# Patient Record
Sex: FEMALE | Race: BLACK OR AFRICAN AMERICAN | NOT HISPANIC OR LATINO
[De-identification: names, ages, dates, MRNs, and addresses within clinical notes are randomized per-mention and may not be internally consistent; named-entity substitution may affect disease eponyms.]

---

## 2017-08-21 ENCOUNTER — RESULT REVIEW (OUTPATIENT)
Age: 52
End: 2017-08-21

## 2018-01-23 ENCOUNTER — RESULT REVIEW (OUTPATIENT)
Age: 53
End: 2018-01-23

## 2018-02-20 ENCOUNTER — OUTPATIENT (OUTPATIENT)
Dept: OUTPATIENT SERVICES | Facility: HOSPITAL | Age: 53
LOS: 1 days | End: 2018-02-20
Payer: COMMERCIAL

## 2018-02-20 ENCOUNTER — APPOINTMENT (OUTPATIENT)
Dept: MRI IMAGING | Facility: IMAGING CENTER | Age: 53
End: 2018-02-20
Payer: COMMERCIAL

## 2018-02-20 DIAGNOSIS — Z00.8 ENCOUNTER FOR OTHER GENERAL EXAMINATION: ICD-10-CM

## 2018-02-20 PROCEDURE — 82565 ASSAY OF CREATININE: CPT

## 2018-02-20 PROCEDURE — 77059 MRI BREAST BILATERAL: CPT | Mod: 26

## 2018-02-20 PROCEDURE — C8937: CPT

## 2018-02-20 PROCEDURE — A9585: CPT

## 2018-02-20 PROCEDURE — 0159T: CPT | Mod: 26

## 2018-02-20 PROCEDURE — C8908: CPT

## 2018-03-09 ENCOUNTER — OUTPATIENT (OUTPATIENT)
Dept: OUTPATIENT SERVICES | Facility: HOSPITAL | Age: 53
LOS: 1 days | End: 2018-03-09
Payer: COMMERCIAL

## 2018-03-09 ENCOUNTER — RESULT REVIEW (OUTPATIENT)
Age: 53
End: 2018-03-09

## 2018-03-09 ENCOUNTER — APPOINTMENT (OUTPATIENT)
Dept: MRI IMAGING | Facility: IMAGING CENTER | Age: 53
End: 2018-03-09
Payer: COMMERCIAL

## 2018-03-09 ENCOUNTER — APPOINTMENT (OUTPATIENT)
Dept: ULTRASOUND IMAGING | Facility: IMAGING CENTER | Age: 53
End: 2018-03-09
Payer: COMMERCIAL

## 2018-03-09 DIAGNOSIS — Z00.8 ENCOUNTER FOR OTHER GENERAL EXAMINATION: ICD-10-CM

## 2018-03-09 PROCEDURE — 88364 INSITU HYBRIDIZATION (FISH): CPT | Mod: 26

## 2018-03-09 PROCEDURE — A9585: CPT

## 2018-03-09 PROCEDURE — 77065 DX MAMMO INCL CAD UNI: CPT | Mod: 26,LT

## 2018-03-09 PROCEDURE — 88305 TISSUE EXAM BY PATHOLOGIST: CPT

## 2018-03-09 PROCEDURE — 19083 BX BREAST 1ST LESION US IMAG: CPT | Mod: LT

## 2018-03-09 PROCEDURE — 38505 NEEDLE BIOPSY LYMPH NODES: CPT

## 2018-03-09 PROCEDURE — 19085 BX BREAST 1ST LESION MR IMAG: CPT | Mod: LT

## 2018-03-09 PROCEDURE — 88365 INSITU HYBRIDIZATION (FISH): CPT

## 2018-03-09 PROCEDURE — A4648: CPT

## 2018-03-09 PROCEDURE — 88342 IMHCHEM/IMCYTCHM 1ST ANTB: CPT

## 2018-03-09 PROCEDURE — 88360 TUMOR IMMUNOHISTOCHEM/MANUAL: CPT | Mod: 26

## 2018-03-09 PROCEDURE — 76942 ECHO GUIDE FOR BIOPSY: CPT | Mod: 26,LT

## 2018-03-09 PROCEDURE — 88364 INSITU HYBRIDIZATION (FISH): CPT

## 2018-03-09 PROCEDURE — 88341 IMHCHEM/IMCYTCHM EA ADD ANTB: CPT | Mod: 26,59

## 2018-03-09 PROCEDURE — 76942 ECHO GUIDE FOR BIOPSY: CPT

## 2018-03-09 PROCEDURE — 19083 BX BREAST 1ST LESION US IMAG: CPT

## 2018-03-09 PROCEDURE — 88342 IMHCHEM/IMCYTCHM 1ST ANTB: CPT | Mod: 26,59

## 2018-03-09 PROCEDURE — 38505 NEEDLE BIOPSY LYMPH NODES: CPT | Mod: LT

## 2018-03-09 PROCEDURE — 88341 IMHCHEM/IMCYTCHM EA ADD ANTB: CPT

## 2018-03-09 PROCEDURE — 88305 TISSUE EXAM BY PATHOLOGIST: CPT | Mod: 26

## 2018-03-09 PROCEDURE — 88365 INSITU HYBRIDIZATION (FISH): CPT | Mod: 26,59

## 2018-03-09 PROCEDURE — 77065 DX MAMMO INCL CAD UNI: CPT

## 2018-03-09 PROCEDURE — 19085 BX BREAST 1ST LESION MR IMAG: CPT

## 2018-03-09 PROCEDURE — 88360 TUMOR IMMUNOHISTOCHEM/MANUAL: CPT

## 2018-03-15 LAB — SURGICAL PATHOLOGY STUDY: SIGNIFICANT CHANGE UP

## 2018-04-02 ENCOUNTER — APPOINTMENT (OUTPATIENT)
Dept: MAMMOGRAPHY | Facility: IMAGING CENTER | Age: 53
End: 2018-04-02
Payer: COMMERCIAL

## 2018-04-02 ENCOUNTER — OUTPATIENT (OUTPATIENT)
Dept: OUTPATIENT SERVICES | Facility: HOSPITAL | Age: 53
LOS: 1 days | End: 2018-04-02
Payer: COMMERCIAL

## 2018-04-02 VITALS
HEART RATE: 80 BPM | SYSTOLIC BLOOD PRESSURE: 139 MMHG | OXYGEN SATURATION: 98 % | HEIGHT: 64 IN | TEMPERATURE: 98 F | WEIGHT: 210.1 LBS | DIASTOLIC BLOOD PRESSURE: 92 MMHG | RESPIRATION RATE: 18 BRPM

## 2018-04-02 DIAGNOSIS — D05.12 INTRADUCTAL CARCINOMA IN SITU OF LEFT BREAST: ICD-10-CM

## 2018-04-02 DIAGNOSIS — Z01.818 ENCOUNTER FOR OTHER PREPROCEDURAL EXAMINATION: ICD-10-CM

## 2018-04-02 DIAGNOSIS — R92.8 OTHER ABNORMAL AND INCONCLUSIVE FINDINGS ON DIAGNOSTIC IMAGING OF BREAST: ICD-10-CM

## 2018-04-02 DIAGNOSIS — Z98.51 TUBAL LIGATION STATUS: Chronic | ICD-10-CM

## 2018-04-02 DIAGNOSIS — Z98.891 HISTORY OF UTERINE SCAR FROM PREVIOUS SURGERY: Chronic | ICD-10-CM

## 2018-04-02 PROCEDURE — 19281 PERQ DEVICE BREAST 1ST IMAG: CPT | Mod: LT

## 2018-04-02 PROCEDURE — 19282 PERQ DEVICE BREAST EA IMAG: CPT | Mod: LT

## 2018-04-02 RX ORDER — SODIUM CHLORIDE 9 MG/ML
3 INJECTION INTRAMUSCULAR; INTRAVENOUS; SUBCUTANEOUS EVERY 8 HOURS
Qty: 0 | Refills: 0 | Status: DISCONTINUED | OUTPATIENT
Start: 2018-04-04 | End: 2018-04-19

## 2018-04-02 RX ORDER — ACETAMINOPHEN 500 MG
1000 TABLET ORAL ONCE
Qty: 0 | Refills: 0 | Status: COMPLETED | OUTPATIENT
Start: 2018-04-04 | End: 2018-04-04

## 2018-04-02 RX ORDER — ATORVASTATIN CALCIUM 80 MG/1
1 TABLET, FILM COATED ORAL
Qty: 0 | Refills: 0 | COMMUNITY

## 2018-04-02 RX ORDER — LIDOCAINE HCL 20 MG/ML
0.2 VIAL (ML) INJECTION ONCE
Qty: 0 | Refills: 0 | Status: DISCONTINUED | OUTPATIENT
Start: 2018-04-04 | End: 2018-04-19

## 2018-04-02 RX ORDER — CEFAZOLIN SODIUM 1 G
2000 VIAL (EA) INJECTION ONCE
Qty: 0 | Refills: 0 | Status: DISCONTINUED | OUTPATIENT
Start: 2018-04-04 | End: 2018-04-19

## 2018-04-02 NOTE — H&P PST ADULT - HISTORY OF PRESENT ILLNESS
52yr old female with intraductal carcinoma in  situ left breast coming in for left breast lumpectomy  x2 sites post dorothy  local x2 Left sentinel lymph  node biopsy

## 2018-04-02 NOTE — H&P PST ADULT - NSANTHOSAYNRD_GEN_A_CORE
No. WON screening performed.  STOP BANG Legend: 0-2 = LOW Risk; 3-4 = INTERMEDIATE Risk; 5-8 = HIGH Risk

## 2018-04-03 ENCOUNTER — TRANSCRIPTION ENCOUNTER (OUTPATIENT)
Age: 53
End: 2018-04-03

## 2018-04-03 RX ORDER — CELECOXIB 200 MG/1
200 CAPSULE ORAL ONCE
Qty: 0 | Refills: 0 | Status: DISCONTINUED | OUTPATIENT
Start: 2018-04-04 | End: 2018-04-19

## 2018-04-03 RX ORDER — SODIUM CHLORIDE 9 MG/ML
1000 INJECTION, SOLUTION INTRAVENOUS
Qty: 0 | Refills: 0 | Status: DISCONTINUED | OUTPATIENT
Start: 2018-04-04 | End: 2018-04-19

## 2018-04-03 RX ORDER — OXYCODONE HYDROCHLORIDE 5 MG/1
5 TABLET ORAL ONCE
Qty: 0 | Refills: 0 | Status: DISCONTINUED | OUTPATIENT
Start: 2018-04-04 | End: 2018-04-04

## 2018-04-03 RX ORDER — ONDANSETRON 8 MG/1
4 TABLET, FILM COATED ORAL ONCE
Qty: 0 | Refills: 0 | Status: DISCONTINUED | OUTPATIENT
Start: 2018-04-04 | End: 2018-04-19

## 2018-04-03 RX ORDER — HYDROMORPHONE HYDROCHLORIDE 2 MG/ML
0.25 INJECTION INTRAMUSCULAR; INTRAVENOUS; SUBCUTANEOUS
Qty: 0 | Refills: 0 | Status: DISCONTINUED | OUTPATIENT
Start: 2018-04-04 | End: 2018-04-04

## 2018-04-04 ENCOUNTER — RESULT REVIEW (OUTPATIENT)
Age: 53
End: 2018-04-04

## 2018-04-04 ENCOUNTER — APPOINTMENT (OUTPATIENT)
Dept: NUCLEAR MEDICINE | Facility: HOSPITAL | Age: 53
End: 2018-04-04

## 2018-04-04 ENCOUNTER — OUTPATIENT (OUTPATIENT)
Dept: OUTPATIENT SERVICES | Facility: HOSPITAL | Age: 53
LOS: 1 days | End: 2018-04-04
Payer: COMMERCIAL

## 2018-04-04 VITALS
SYSTOLIC BLOOD PRESSURE: 123 MMHG | HEART RATE: 108 BPM | DIASTOLIC BLOOD PRESSURE: 60 MMHG | RESPIRATION RATE: 16 BRPM | OXYGEN SATURATION: 99 %

## 2018-04-04 VITALS
RESPIRATION RATE: 18 BRPM | HEIGHT: 64 IN | OXYGEN SATURATION: 99 % | DIASTOLIC BLOOD PRESSURE: 92 MMHG | WEIGHT: 210.1 LBS | SYSTOLIC BLOOD PRESSURE: 139 MMHG | TEMPERATURE: 98 F | HEART RATE: 83 BPM

## 2018-04-04 DIAGNOSIS — D05.12 INTRADUCTAL CARCINOMA IN SITU OF LEFT BREAST: ICD-10-CM

## 2018-04-04 DIAGNOSIS — Z98.51 TUBAL LIGATION STATUS: Chronic | ICD-10-CM

## 2018-04-04 DIAGNOSIS — Z98.891 HISTORY OF UTERINE SCAR FROM PREVIOUS SURGERY: Chronic | ICD-10-CM

## 2018-04-04 DIAGNOSIS — Z01.818 ENCOUNTER FOR OTHER PREPROCEDURAL EXAMINATION: ICD-10-CM

## 2018-04-04 LAB — GLUCOSE BLDC GLUCOMTR-MCNC: 152 MG/DL — HIGH (ref 70–99)

## 2018-04-04 PROCEDURE — 14301 TIS TRNFR ANY 30.1-60 SQ CM: CPT

## 2018-04-04 PROCEDURE — 76098 X-RAY EXAM SURGICAL SPECIMEN: CPT | Mod: 26

## 2018-04-04 PROCEDURE — 88307 TISSUE EXAM BY PATHOLOGIST: CPT | Mod: 26

## 2018-04-04 PROCEDURE — 19281 PERQ DEVICE BREAST 1ST IMAG: CPT

## 2018-04-04 PROCEDURE — 76098 X-RAY EXAM SURGICAL SPECIMEN: CPT

## 2018-04-04 PROCEDURE — C1889: CPT

## 2018-04-04 PROCEDURE — A4648: CPT

## 2018-04-04 PROCEDURE — 38792 RA TRACER ID OF SENTINL NODE: CPT

## 2018-04-04 PROCEDURE — 88307 TISSUE EXAM BY PATHOLOGIST: CPT

## 2018-04-04 PROCEDURE — 38525 BIOPSY/REMOVAL LYMPH NODES: CPT | Mod: LT

## 2018-04-04 PROCEDURE — 19301 PARTIAL MASTECTOMY: CPT | Mod: LT

## 2018-04-04 PROCEDURE — A9541: CPT

## 2018-04-04 PROCEDURE — 82962 GLUCOSE BLOOD TEST: CPT

## 2018-04-04 RX ORDER — ACETAMINOPHEN 500 MG
1000 TABLET ORAL ONCE
Qty: 0 | Refills: 0 | Status: COMPLETED | OUTPATIENT
Start: 2018-04-04 | End: 2018-04-04

## 2018-04-04 RX ORDER — CELECOXIB 200 MG/1
200 CAPSULE ORAL ONCE
Qty: 0 | Refills: 0 | Status: COMPLETED | OUTPATIENT
Start: 2018-04-04 | End: 2018-04-04

## 2018-04-04 RX ADMIN — CELECOXIB 200 MILLIGRAM(S): 200 CAPSULE ORAL at 13:10

## 2018-04-04 RX ADMIN — Medication 400 MILLIGRAM(S): at 19:45

## 2018-04-04 RX ADMIN — Medication 1000 MILLIGRAM(S): at 13:10

## 2018-04-09 LAB — SURGICAL PATHOLOGY STUDY: SIGNIFICANT CHANGE UP

## 2018-06-01 ENCOUNTER — OUTPATIENT (OUTPATIENT)
Dept: OUTPATIENT SERVICES | Facility: HOSPITAL | Age: 53
LOS: 1 days | End: 2018-06-01
Payer: COMMERCIAL

## 2018-06-01 ENCOUNTER — APPOINTMENT (OUTPATIENT)
Dept: RADIOLOGY | Facility: IMAGING CENTER | Age: 53
End: 2018-06-01
Payer: COMMERCIAL

## 2018-06-01 DIAGNOSIS — Z00.8 ENCOUNTER FOR OTHER GENERAL EXAMINATION: ICD-10-CM

## 2018-06-01 DIAGNOSIS — Z98.51 TUBAL LIGATION STATUS: Chronic | ICD-10-CM

## 2018-06-01 DIAGNOSIS — Z98.891 HISTORY OF UTERINE SCAR FROM PREVIOUS SURGERY: Chronic | ICD-10-CM

## 2018-06-01 PROCEDURE — 77080 DXA BONE DENSITY AXIAL: CPT | Mod: 26

## 2018-06-01 PROCEDURE — 77080 DXA BONE DENSITY AXIAL: CPT

## 2018-06-07 ENCOUNTER — RESULT REVIEW (OUTPATIENT)
Age: 53
End: 2018-06-07

## 2018-06-07 ENCOUNTER — OUTPATIENT (OUTPATIENT)
Dept: OUTPATIENT SERVICES | Facility: HOSPITAL | Age: 53
LOS: 1 days | End: 2018-06-07
Payer: COMMERCIAL

## 2018-06-07 DIAGNOSIS — Z98.51 TUBAL LIGATION STATUS: Chronic | ICD-10-CM

## 2018-06-07 DIAGNOSIS — D05.12 INTRADUCTAL CARCINOMA IN SITU OF LEFT BREAST: ICD-10-CM

## 2018-06-07 DIAGNOSIS — Z98.891 HISTORY OF UTERINE SCAR FROM PREVIOUS SURGERY: Chronic | ICD-10-CM

## 2018-06-07 PROCEDURE — 88363 XM ARCHIVE TISSUE MOLEC ANAL: CPT

## 2018-07-16 ENCOUNTER — APPOINTMENT (OUTPATIENT)
Dept: ENDOCRINOLOGY | Facility: CLINIC | Age: 53
End: 2018-07-16
Payer: COMMERCIAL

## 2018-07-16 VITALS
SYSTOLIC BLOOD PRESSURE: 150 MMHG | WEIGHT: 206 LBS | DIASTOLIC BLOOD PRESSURE: 80 MMHG | HEIGHT: 64 IN | BODY MASS INDEX: 35.17 KG/M2

## 2018-07-16 DIAGNOSIS — Z83.3 FAMILY HISTORY OF DIABETES MELLITUS: ICD-10-CM

## 2018-07-16 PROCEDURE — 99244 OFF/OP CNSLTJ NEW/EST MOD 40: CPT

## 2018-07-16 RX ORDER — ANASTROZOLE TABLETS 1 MG/1
TABLET ORAL
Refills: 0 | Status: ACTIVE | COMMUNITY

## 2018-07-17 LAB — SURGICAL PATHOLOGY STUDY: SIGNIFICANT CHANGE UP

## 2018-07-18 PROBLEM — E11.9 TYPE 2 DIABETES MELLITUS WITHOUT COMPLICATIONS: Chronic | Status: ACTIVE | Noted: 2018-04-02

## 2018-07-18 PROBLEM — E78.5 HYPERLIPIDEMIA, UNSPECIFIED: Chronic | Status: ACTIVE | Noted: 2018-04-02

## 2018-07-19 ENCOUNTER — RX RENEWAL (OUTPATIENT)
Age: 53
End: 2018-07-19

## 2018-07-19 LAB
25(OH)D3 SERPL-MCNC: 43.3 NG/ML
CHOLEST SERPL-MCNC: 175 MG/DL
CHOLEST/HDLC SERPL: 2.8 RATIO
HBA1C MFR BLD HPLC: 11.6 %
HDLC SERPL-MCNC: 63 MG/DL
LDLC SERPL CALC-MCNC: 90 MG/DL
T4 FREE SERPL-MCNC: 1 NG/DL
TRIGL SERPL-MCNC: 112 MG/DL
TSH SERPL-ACNC: 1.47 UIU/ML

## 2018-07-25 ENCOUNTER — OUTPATIENT (OUTPATIENT)
Dept: OUTPATIENT SERVICES | Facility: HOSPITAL | Age: 53
LOS: 1 days | End: 2018-07-25
Payer: COMMERCIAL

## 2018-07-25 ENCOUNTER — APPOINTMENT (OUTPATIENT)
Dept: ULTRASOUND IMAGING | Facility: IMAGING CENTER | Age: 53
End: 2018-07-25
Payer: COMMERCIAL

## 2018-07-25 DIAGNOSIS — Z98.891 HISTORY OF UTERINE SCAR FROM PREVIOUS SURGERY: Chronic | ICD-10-CM

## 2018-07-25 DIAGNOSIS — E04.1 NONTOXIC SINGLE THYROID NODULE: ICD-10-CM

## 2018-07-25 DIAGNOSIS — Z98.51 TUBAL LIGATION STATUS: Chronic | ICD-10-CM

## 2018-07-25 PROCEDURE — 76536 US EXAM OF HEAD AND NECK: CPT

## 2018-07-25 PROCEDURE — 76536 US EXAM OF HEAD AND NECK: CPT | Mod: 26

## 2018-07-26 ENCOUNTER — APPOINTMENT (OUTPATIENT)
Dept: ENDOCRINOLOGY | Facility: CLINIC | Age: 53
End: 2018-07-26
Payer: COMMERCIAL

## 2018-07-26 ENCOUNTER — RESULT REVIEW (OUTPATIENT)
Age: 53
End: 2018-07-26

## 2018-07-26 LAB — GLUCOSE BLDC GLUCOMTR-MCNC: 114

## 2018-07-26 PROCEDURE — G0108 DIAB MANAGE TRN  PER INDIV: CPT

## 2018-07-26 PROCEDURE — 82962 GLUCOSE BLOOD TEST: CPT

## 2018-08-03 ENCOUNTER — EMERGENCY (EMERGENCY)
Facility: HOSPITAL | Age: 53
LOS: 1 days | Discharge: HOME | End: 2018-08-03
Attending: EMERGENCY MEDICINE | Admitting: EMERGENCY MEDICINE
Payer: COMMERCIAL

## 2018-08-03 VITALS
SYSTOLIC BLOOD PRESSURE: 122 MMHG | OXYGEN SATURATION: 100 % | TEMPERATURE: 98 F | DIASTOLIC BLOOD PRESSURE: 73 MMHG | RESPIRATION RATE: 16 BRPM | HEART RATE: 70 BPM

## 2018-08-03 DIAGNOSIS — Z98.891 HISTORY OF UTERINE SCAR FROM PREVIOUS SURGERY: Chronic | ICD-10-CM

## 2018-08-03 DIAGNOSIS — Z98.51 TUBAL LIGATION STATUS: Chronic | ICD-10-CM

## 2018-08-03 LAB
ALBUMIN SERPL ELPH-MCNC: 3.9 G/DL — SIGNIFICANT CHANGE UP (ref 3.3–5)
ALP SERPL-CCNC: 85 U/L — SIGNIFICANT CHANGE UP (ref 40–120)
ALT FLD-CCNC: 9 U/L — SIGNIFICANT CHANGE UP (ref 4–33)
AST SERPL-CCNC: 11 U/L — SIGNIFICANT CHANGE UP (ref 4–32)
BASOPHILS # BLD AUTO: 0.04 K/UL — SIGNIFICANT CHANGE UP (ref 0–0.2)
BASOPHILS NFR BLD AUTO: 0.6 % — SIGNIFICANT CHANGE UP (ref 0–2)
BILIRUB SERPL-MCNC: 0.2 MG/DL — SIGNIFICANT CHANGE UP (ref 0.2–1.2)
BUN SERPL-MCNC: 14 MG/DL — SIGNIFICANT CHANGE UP (ref 7–23)
CALCIUM SERPL-MCNC: 9.4 MG/DL — SIGNIFICANT CHANGE UP (ref 8.4–10.5)
CHLORIDE SERPL-SCNC: 105 MMOL/L — SIGNIFICANT CHANGE UP (ref 98–107)
CO2 SERPL-SCNC: 25 MMOL/L — SIGNIFICANT CHANGE UP (ref 22–31)
CREAT SERPL-MCNC: 0.7 MG/DL — SIGNIFICANT CHANGE UP (ref 0.5–1.3)
EOSINOPHIL # BLD AUTO: 0.14 K/UL — SIGNIFICANT CHANGE UP (ref 0–0.5)
EOSINOPHIL NFR BLD AUTO: 2 % — SIGNIFICANT CHANGE UP (ref 0–6)
GLUCOSE SERPL-MCNC: 103 MG/DL — HIGH (ref 70–99)
HCT VFR BLD CALC: 41.4 % — SIGNIFICANT CHANGE UP (ref 34.5–45)
HGB BLD-MCNC: 13.9 G/DL — SIGNIFICANT CHANGE UP (ref 11.5–15.5)
IMM GRANULOCYTES # BLD AUTO: 0.03 # — SIGNIFICANT CHANGE UP
IMM GRANULOCYTES NFR BLD AUTO: 0.4 % — SIGNIFICANT CHANGE UP (ref 0–1.5)
LYMPHOCYTES # BLD AUTO: 2.2 K/UL — SIGNIFICANT CHANGE UP (ref 1–3.3)
LYMPHOCYTES # BLD AUTO: 31 % — SIGNIFICANT CHANGE UP (ref 13–44)
MCHC RBC-ENTMCNC: 28.8 PG — SIGNIFICANT CHANGE UP (ref 27–34)
MCHC RBC-ENTMCNC: 33.6 % — SIGNIFICANT CHANGE UP (ref 32–36)
MCV RBC AUTO: 85.9 FL — SIGNIFICANT CHANGE UP (ref 80–100)
MONOCYTES # BLD AUTO: 0.5 K/UL — SIGNIFICANT CHANGE UP (ref 0–0.9)
MONOCYTES NFR BLD AUTO: 7.1 % — SIGNIFICANT CHANGE UP (ref 2–14)
NEUTROPHILS # BLD AUTO: 4.18 K/UL — SIGNIFICANT CHANGE UP (ref 1.8–7.4)
NEUTROPHILS NFR BLD AUTO: 58.9 % — SIGNIFICANT CHANGE UP (ref 43–77)
NRBC # FLD: 0 — SIGNIFICANT CHANGE UP
PLATELET # BLD AUTO: 223 K/UL — SIGNIFICANT CHANGE UP (ref 150–400)
PMV BLD: 11.2 FL — SIGNIFICANT CHANGE UP (ref 7–13)
POTASSIUM SERPL-MCNC: 3.9 MMOL/L — SIGNIFICANT CHANGE UP (ref 3.5–5.3)
POTASSIUM SERPL-SCNC: 3.9 MMOL/L — SIGNIFICANT CHANGE UP (ref 3.5–5.3)
PROT SERPL-MCNC: 7.2 G/DL — SIGNIFICANT CHANGE UP (ref 6–8.3)
RBC # BLD: 4.82 M/UL — SIGNIFICANT CHANGE UP (ref 3.8–5.2)
RBC # FLD: 13.4 % — SIGNIFICANT CHANGE UP (ref 10.3–14.5)
SODIUM SERPL-SCNC: 143 MMOL/L — SIGNIFICANT CHANGE UP (ref 135–145)
WBC # BLD: 7.09 K/UL — SIGNIFICANT CHANGE UP (ref 3.8–10.5)
WBC # FLD AUTO: 7.09 K/UL — SIGNIFICANT CHANGE UP (ref 3.8–10.5)

## 2018-08-03 PROCEDURE — 99283 EMERGENCY DEPT VISIT LOW MDM: CPT

## 2018-08-03 RX ORDER — MECLIZINE HCL 12.5 MG
1 TABLET ORAL
Qty: 15 | Refills: 0
Start: 2018-08-03

## 2018-08-03 RX ORDER — MECLIZINE HCL 12.5 MG
25 TABLET ORAL ONCE
Qty: 0 | Refills: 0 | Status: COMPLETED | OUTPATIENT
Start: 2018-08-03 | End: 2018-08-03

## 2018-08-03 RX ADMIN — Medication 25 MILLIGRAM(S): at 17:37

## 2018-08-03 NOTE — ED PROVIDER NOTE - PROGRESS NOTE DETAILS
Rafael att: Rafael att: Patient ambulatory to and from bathroom without issue. Labs nl. dc instructions below.

## 2018-08-03 NOTE — ED PROVIDER NOTE - CARE PLAN
Principal Discharge DX:	Dizziness  Assessment and plan of treatment:	Seen in ER for vertigo. Please take Meclizine 25 mg one tab every 8 hours as needed for dizziness. Keep your MRI as scheduled and continue to follow up with ENT doctor. Return to ER for any new or worsening symptoms,.

## 2018-08-03 NOTE — ED ADULT TRIAGE NOTE - CHIEF COMPLAINT QUOTE
near -syncope while having BM at MD office. pt c/o dizziness x several weeks. pmhx dm, htn (fs 156 as per ems)

## 2018-08-03 NOTE — ED ADULT NURSE NOTE - NSIMPLEMENTINTERV_GEN_ALL_ED
Implemented All Fall Risk Interventions:  [place] to call system. Call bell, personal items and telephone within reach. Instruct patient to call for assistance. Room bathroom lighting operational. Non-slip footwear when patient is off stretcher. Physically safe environment: no spills, clutter or unnecessary equipment. Stretcher in lowest position, wheels locked, appropriate side rails in place. Provide visual cue, wrist band, yellow gown, etc. Monitor gait and stability. Monitor for mental status changes and reorient to person, place, and time. Review medications for side effects contributing to fall risk. Reinforce activity limits and safety measures with patient and family.

## 2018-08-03 NOTE — ED SUB INTERN NOTE - PHYSICAL EXAMINATION
Focused Assessment for CC: Vertigo  AAOx3. CN II-XII intact. No FND. Strength 5/5 BL UE/LE.  HINTS  HI - VOR intact  N - no resting nystagmus; no nystagmus evoked.  TS - not performed  No murmur, no carotid bruit or thrill.     GENERAL:  WNWD Young/Middle-Aged/Elderly M/F  EYE: EOMI, symmetrical lids, normal conjunctiva  ENMT: Atraumatic external nose and ears, moist mucous membranes  NECK: Symmetric, no tracheal deviation  CVS: RRR, No murmurs appreciated.  RESP: Unlabored respiratory effort, no central cyanosis, no evidence for respiratory distress, lungs CTAB  GI: Abdomen is non-distended, non-tympanic, soft and non-tender.  MSK: No edema, no deformities. No ROM limitation. Strength 5/5.  DERM: Skin is warm, dry. No rashes or lesions appreciated during exam.  NEURO: AAOx3. Moving all 4 extremities without limitation. No facial droop. No dysarthria.  PSYCH: Appropriate mood and affect during encounter. Focused Assessment for CC: Vertigo  AAOx3. CN II-XII intact. No FND. Strength 5/5 BL UE/LE.  HINTS  HI - VOR intact  N - no resting nystagmus; no nystagmus evoked.  TS - not performed  No murmur, no carotid bruit or thrill.     GENERAL:  WNWD Middle-Aged F  EYE: EOMI, symmetrical lids, normal conjunctiva  ENMT: Atraumatic external nose and ears, moist mucous membranes  NECK: Symmetric, no tracheal deviation  CVS: RRR, No murmurs appreciated.  RESP: Unlabored respiratory effort, no central cyanosis, no evidence for respiratory distress, lungs CTAB  GI: Abdomen is non-distended, non-tympanic, soft and non-tender.  MSK: No edema, no deformities. No ROM limitation. Strength 5/5.  DERM: Skin is warm, dry. No rashes or lesions appreciated during exam.  NEURO: AAOx3. Moving all 4 extremities without limitation. No facial droop. No dysarthria.  PSYCH: Appropriate mood and affect during encounter. Focused Assessment for CC: Vertigo  AAOx3. CN II-XII intact. No FND. Strength 5/5 BL UE/LE.  HINTS  HI - VOR intact  N - no resting nystagmus; no nystagmus evoked.  TS - not performed  No murmur, no carotid bruit or thrill.    GENERAL:  WNWD Middle-Aged F  EYE: EOMI, symmetrical lids, normal conjunctiva  ENMT: Atraumatic external nose and ears, moist mucous membranes  NECK: Symmetric, no tracheal deviation  CVS: RRR, No murmurs appreciated.  RESP: Unlabored respiratory effort, no central cyanosis, no evidence for respiratory distress, lungs CTAB  GI: Abdomen is non-distended, non-tympanic, soft and non-tender.  MSK: No edema, no deformities. No ROM limitation. Strength 5/5.  DERM: Skin is warm, dry. No rashes or lesions appreciated during exam.  NEURO: AAOx3. Moving all 4 extremities without limitation. No facial droop. No dysarthria.  PSYCH: Appropriate mood and affect during encounter.

## 2018-08-03 NOTE — ED SUB INTERN NOTE - LAB INTERPRETATION
CBC wnl CBC wnl  CMP wnl  UPT negative  I have personally reviewed and interpreted all labs and imaging performed during this encounter except where noted otherwise.

## 2018-08-03 NOTE — ED SUB INTERN NOTE - MEDICAL DECISION MAKING DETAILS
Diagnosis: BPPV DDx: I have considered the following life/limb threatening etiologies of this patients clincial condition: CVA/TIA: Patient w/ hx of prior episodes c/w bppv, few CV risk factors, neuro exam non-focal, HINTS equivocal. Carotid Artery Stenosis: hx is episodic without chronicity, progression or worsening. Intracranial mass: hx is episodic, without chronicity or worsening, non-focal neuro, no carotid bruit or murmur on exam. Valvulopathy: no murmur on exam. Hypoglycemic episode: pt w/ new oral hypoglycemic agent - glucose normal. Ectopic pregnancy: patient denies abdominal/pelvic pain, LMP 2015, UPT pending. Labyrinthitis/vestibular neuronitis: Hx is episodic, patient afebrile, vertigo not provoked on HINTS exam.   Patient may be safely discharged to self; meclizine PO; f/u w/ OP ENT or PMD if refractory or worsening.  DW Resident JUAN aHll Diagnosis: BPPV DDx: I have considered the following life/limb threatening etiologies of this patients clincial condition: CVA/TIA: Patient w/ hx of prior episodes c/w bppv, few CV risk factors, neuro exam non-focal, HINTS equivocal. Carotid Artery Stenosis: hx is episodic without chronicity, progression or worsening. Intracranial mass: hx is episodic, without chronicity or worsening, non-focal neuro, no carotid bruit or murmur on exam. Valvulopathy: no murmur on exam. Hypoglycemic episode: pt w/ new oral hypoglycemic agent - glucose normal. Ectopic pregnancy: patient denies abdominal/pelvic pain, LMP 2015, UPT negative. Labyrinthitis/vestibular neuronitis: Hx is episodic, patient afebrile, vertigo not provoked on HINTS exam.   Patient may be safely discharged to self; meclizine PO; f/u w/ OP ENT or PMD if refractory or worsening.  DW Resident JUAN Hall Diagnosis: BPPV DDx: I have considered the following life/limb threatening etiologies of this patients clincial condition: CVA/TIA: Patient w/ hx of prior episodes c/w bppv, few CV risk factors, neuro exam non-focal, HINTS equivocal. Carotid Artery Stenosis: hx is episodic without chronicity, progression or worsening. Intracranial mass: hx is episodic, without chronicity or worsening, non-focal neuro, no carotid bruit or murmur on exam. Valvulopathy: no murmur on exam. Hypoglycemic episode: pt w/ new oral hypoglycemic agent - glucose normal. Ectopic pregnancy: patient denies abdominal/pelvic pain, LMP 2015, UPT negative. Labyrinthitis/vestibular neuronitis: Hx is episodic, patient afebrile, vertigo not provoked on HINTS exam.   Patient may be safely discharged to self; meclizine PO; f/u w/ OP ENT or PMD if refractory or worsening.  JUAN Hall

## 2018-08-03 NOTE — ED SUB INTERN NOTE - OBJECTIVE STATEMENT FT
53 y/o F w/ DM & episodic vertigo p/w c/o fall following episode that occurred 30 min after a BM. Patient states 1st episode 1 y/a, then again 1 m/a, then 2 w/a, last episode today. Patient states that today, episode following BM, fell, hit head but denies LOC. Denies HA, VC, CP, SOB, N/V, blood in stool. New oral hypoglycemic agent started recently. Denies neck pain. 53 y/o F w/ DM & episodic vertigo p/w c/o fall following episode that occurred 30 min after a BM. Patient states 1st episode 1 y/a, then again 1 m/a, then 2 w/a, last episode today. Patient states that today, episode following BM, fell, hit head but denies LOC. Denies HA, VC, CP, SOB, N/V, blood in stool. New oral hypoglycemic agent started recently. Denies neck pain. Denies tinnitus. Denies fevers. Denies Family Hx of SCD.

## 2018-08-03 NOTE — ED SUB INTERN NOTE - DIAGNOSIS COUNSELING, MDM
conducted a detailed discussion... I have counseled the patient on likely cause of symptoms, results of labs, imaging, and procedures performed during encounter, and the plan of treatment moving forward. I also spoke to the patient regarding diagnostic uncertainty and the need for possible further testing or intervention. Patient expressed understanding and agreement with these recommendations and was safely discharged home.

## 2018-08-03 NOTE — ED SUB INTERN NOTE - PROGRESS NOTE DETAILS
Patient seen and examined by Sub-Intern Bobo Galloway and Attending Dr. Hall Patient given Meclizine; UPT, CBC+diff ordered.

## 2018-08-03 NOTE — ED PROVIDER NOTE - OBJECTIVE STATEMENT
17:21 Rafael att: 52F h/o vertigo c/o 3 episodes of vertigo in past one month. First episode 1 mo ago, second episode 2 wk ago, and third episode today while at ENT office for eval of vertigo. 30 Min after leaving ENT office patient felt acute room spinning, fell, pos head trauma, neg loc or blood thinners. Denies cp or sob. PMH dm PSH MED ALL SMOKE PCP PHARMACY

## 2018-08-03 NOTE — ED ADULT NURSE NOTE - OBJECTIVE STATEMENT
received pt in bed A and O x 3  in NAD, family at bedside. as per patient " I felt dizzy and fell in my doctor's ENT office". pt denies head injury denies pain, PERRLA, no facial asymmetry noted, extremities are strong and equal . as per family, pt had an episode of syncope with LOC 1 minute. pt unable to recall the event, Provider at bedside.  pt presents with EMS line 20G ro right hand. Labs drawn and sent.

## 2018-08-03 NOTE — ED PROVIDER NOTE - PLAN OF CARE
Seen in ER for vertigo. Please take Meclizine 25 mg one tab every 8 hours as needed for dizziness. Keep your MRI as scheduled and continue to follow up with ENT doctor. Return to ER for any new or worsening symptoms,.

## 2018-08-20 ENCOUNTER — RX RENEWAL (OUTPATIENT)
Age: 53
End: 2018-08-20

## 2018-08-21 ENCOUNTER — APPOINTMENT (OUTPATIENT)
Dept: ENDOCRINOLOGY | Facility: CLINIC | Age: 53
End: 2018-08-21

## 2018-08-22 ENCOUNTER — OUTPATIENT (OUTPATIENT)
Dept: OUTPATIENT SERVICES | Facility: HOSPITAL | Age: 53
LOS: 1 days | End: 2018-08-22
Payer: COMMERCIAL

## 2018-08-22 ENCOUNTER — RESULT REVIEW (OUTPATIENT)
Age: 53
End: 2018-08-22

## 2018-08-22 ENCOUNTER — APPOINTMENT (OUTPATIENT)
Dept: ULTRASOUND IMAGING | Facility: IMAGING CENTER | Age: 53
End: 2018-08-22
Payer: COMMERCIAL

## 2018-08-22 DIAGNOSIS — Z98.891 HISTORY OF UTERINE SCAR FROM PREVIOUS SURGERY: Chronic | ICD-10-CM

## 2018-08-22 DIAGNOSIS — E11.9 TYPE 2 DIABETES MELLITUS WITHOUT COMPLICATIONS: ICD-10-CM

## 2018-08-22 DIAGNOSIS — Z98.51 TUBAL LIGATION STATUS: Chronic | ICD-10-CM

## 2018-08-22 PROCEDURE — 10022: CPT

## 2018-08-22 PROCEDURE — 76942 ECHO GUIDE FOR BIOPSY: CPT | Mod: 26

## 2018-08-22 PROCEDURE — 76942 ECHO GUIDE FOR BIOPSY: CPT

## 2018-08-22 PROCEDURE — 88173 CYTOPATH EVAL FNA REPORT: CPT

## 2018-08-22 PROCEDURE — 88172 CYTP DX EVAL FNA 1ST EA SITE: CPT

## 2018-08-22 PROCEDURE — 88173 CYTOPATH EVAL FNA REPORT: CPT | Mod: 26

## 2018-08-23 ENCOUNTER — APPOINTMENT (OUTPATIENT)
Dept: NEUROSURGERY | Facility: CLINIC | Age: 53
End: 2018-08-23
Payer: COMMERCIAL

## 2018-08-23 VITALS
OXYGEN SATURATION: 96 % | HEIGHT: 64 IN | WEIGHT: 210 LBS | DIASTOLIC BLOOD PRESSURE: 85 MMHG | BODY MASS INDEX: 35.85 KG/M2 | TEMPERATURE: 98.3 F | SYSTOLIC BLOOD PRESSURE: 133 MMHG | HEART RATE: 83 BPM

## 2018-08-23 PROCEDURE — 99204 OFFICE O/P NEW MOD 45 MIN: CPT

## 2018-08-27 ENCOUNTER — APPOINTMENT (OUTPATIENT)
Dept: MRI IMAGING | Facility: HOSPITAL | Age: 53
End: 2018-08-27

## 2018-08-27 ENCOUNTER — OUTPATIENT (OUTPATIENT)
Dept: OUTPATIENT SERVICES | Facility: HOSPITAL | Age: 53
LOS: 1 days | End: 2018-08-27
Payer: COMMERCIAL

## 2018-08-27 ENCOUNTER — APPOINTMENT (OUTPATIENT)
Dept: MRI IMAGING | Facility: IMAGING CENTER | Age: 53
End: 2018-08-27

## 2018-08-27 DIAGNOSIS — Z98.51 TUBAL LIGATION STATUS: Chronic | ICD-10-CM

## 2018-08-27 DIAGNOSIS — Z98.891 HISTORY OF UTERINE SCAR FROM PREVIOUS SURGERY: Chronic | ICD-10-CM

## 2018-08-27 DIAGNOSIS — I65.09 OCCLUSION AND STENOSIS OF UNSPECIFIED VERTEBRAL ARTERY: ICD-10-CM

## 2018-08-27 PROCEDURE — 70547 MR ANGIOGRAPHY NECK W/O DYE: CPT | Mod: 26

## 2018-08-27 PROCEDURE — 70547 MR ANGIOGRAPHY NECK W/O DYE: CPT

## 2018-09-20 ENCOUNTER — APPOINTMENT (OUTPATIENT)
Dept: NEUROSURGERY | Facility: CLINIC | Age: 53
End: 2018-09-20
Payer: COMMERCIAL

## 2018-09-20 VITALS
DIASTOLIC BLOOD PRESSURE: 81 MMHG | SYSTOLIC BLOOD PRESSURE: 118 MMHG | BODY MASS INDEX: 35.85 KG/M2 | WEIGHT: 210 LBS | OXYGEN SATURATION: 97 % | HEART RATE: 90 BPM | RESPIRATION RATE: 16 BRPM | HEIGHT: 64 IN

## 2018-09-20 PROCEDURE — 99213 OFFICE O/P EST LOW 20 MIN: CPT

## 2018-10-24 ENCOUNTER — APPOINTMENT (OUTPATIENT)
Dept: ENDOCRINOLOGY | Facility: CLINIC | Age: 53
End: 2018-10-24

## 2018-12-29 ENCOUNTER — OUTPATIENT (OUTPATIENT)
Dept: OUTPATIENT SERVICES | Facility: HOSPITAL | Age: 53
LOS: 1 days | End: 2018-12-29
Payer: COMMERCIAL

## 2018-12-29 ENCOUNTER — APPOINTMENT (OUTPATIENT)
Dept: MAMMOGRAPHY | Facility: IMAGING CENTER | Age: 53
End: 2018-12-29
Payer: COMMERCIAL

## 2018-12-29 ENCOUNTER — APPOINTMENT (OUTPATIENT)
Dept: ULTRASOUND IMAGING | Facility: IMAGING CENTER | Age: 53
End: 2018-12-29
Payer: COMMERCIAL

## 2018-12-29 DIAGNOSIS — Z98.51 TUBAL LIGATION STATUS: Chronic | ICD-10-CM

## 2018-12-29 DIAGNOSIS — Z98.891 HISTORY OF UTERINE SCAR FROM PREVIOUS SURGERY: Chronic | ICD-10-CM

## 2018-12-29 DIAGNOSIS — Z00.8 ENCOUNTER FOR OTHER GENERAL EXAMINATION: ICD-10-CM

## 2018-12-29 PROCEDURE — 76641 ULTRASOUND BREAST COMPLETE: CPT | Mod: 26,50

## 2018-12-29 PROCEDURE — 76641 ULTRASOUND BREAST COMPLETE: CPT

## 2018-12-29 PROCEDURE — G0279: CPT | Mod: 26

## 2018-12-29 PROCEDURE — 77066 DX MAMMO INCL CAD BI: CPT

## 2018-12-29 PROCEDURE — 77066 DX MAMMO INCL CAD BI: CPT | Mod: 26

## 2018-12-29 PROCEDURE — G0279: CPT

## 2019-04-02 PROCEDURE — 85027 COMPLETE CBC AUTOMATED: CPT

## 2019-04-02 PROCEDURE — 19281 PERQ DEVICE BREAST 1ST IMAG: CPT

## 2019-04-02 PROCEDURE — C1739: CPT

## 2019-04-02 PROCEDURE — 19282 PERQ DEVICE BREAST EA IMAG: CPT

## 2019-04-02 PROCEDURE — 80048 BASIC METABOLIC PNL TOTAL CA: CPT

## 2019-04-02 PROCEDURE — 83036 HEMOGLOBIN GLYCOSYLATED A1C: CPT

## 2019-05-03 ENCOUNTER — APPOINTMENT (OUTPATIENT)
Dept: MRI IMAGING | Facility: IMAGING CENTER | Age: 54
End: 2019-05-03
Payer: COMMERCIAL

## 2019-05-03 ENCOUNTER — OUTPATIENT (OUTPATIENT)
Dept: OUTPATIENT SERVICES | Facility: HOSPITAL | Age: 54
LOS: 1 days | End: 2019-05-03
Payer: COMMERCIAL

## 2019-05-03 DIAGNOSIS — Z00.8 ENCOUNTER FOR OTHER GENERAL EXAMINATION: ICD-10-CM

## 2019-05-03 DIAGNOSIS — Z98.51 TUBAL LIGATION STATUS: Chronic | ICD-10-CM

## 2019-05-03 DIAGNOSIS — Z98.891 HISTORY OF UTERINE SCAR FROM PREVIOUS SURGERY: Chronic | ICD-10-CM

## 2019-05-03 PROCEDURE — C8937: CPT

## 2019-05-03 PROCEDURE — A9585: CPT

## 2019-05-03 PROCEDURE — 77049 MRI BREAST C-+ W/CAD BI: CPT | Mod: 26

## 2019-05-03 PROCEDURE — C8908: CPT

## 2019-06-06 ENCOUNTER — OUTPATIENT (OUTPATIENT)
Dept: OUTPATIENT SERVICES | Facility: HOSPITAL | Age: 54
LOS: 1 days | End: 2019-06-06
Payer: COMMERCIAL

## 2019-06-06 ENCOUNTER — APPOINTMENT (OUTPATIENT)
Dept: ULTRASOUND IMAGING | Facility: IMAGING CENTER | Age: 54
End: 2019-06-06
Payer: COMMERCIAL

## 2019-06-06 DIAGNOSIS — Z98.891 HISTORY OF UTERINE SCAR FROM PREVIOUS SURGERY: Chronic | ICD-10-CM

## 2019-06-06 DIAGNOSIS — Z98.51 TUBAL LIGATION STATUS: Chronic | ICD-10-CM

## 2019-06-06 DIAGNOSIS — R10.12 LEFT UPPER QUADRANT PAIN: ICD-10-CM

## 2019-06-06 PROCEDURE — 76700 US EXAM ABDOM COMPLETE: CPT

## 2019-06-06 PROCEDURE — 76700 US EXAM ABDOM COMPLETE: CPT | Mod: 26

## 2019-07-30 ENCOUNTER — APPOINTMENT (OUTPATIENT)
Dept: OPHTHALMOLOGY | Facility: CLINIC | Age: 54
End: 2019-07-30
Payer: COMMERCIAL

## 2019-07-30 PROCEDURE — 92133 CPTRZD OPH DX IMG PST SGM ON: CPT

## 2019-07-30 PROCEDURE — 92083 EXTENDED VISUAL FIELD XM: CPT

## 2019-07-30 PROCEDURE — ZZZZZ: CPT

## 2019-07-30 PROCEDURE — 92004 COMPRE OPH EXAM NEW PT 1/>: CPT

## 2019-11-09 ENCOUNTER — RESULT REVIEW (OUTPATIENT)
Age: 54
End: 2019-11-09

## 2019-11-11 ENCOUNTER — APPOINTMENT (OUTPATIENT)
Dept: ENDOCRINOLOGY | Facility: CLINIC | Age: 54
End: 2019-11-11
Payer: COMMERCIAL

## 2019-11-11 VITALS
OXYGEN SATURATION: 96 % | BODY MASS INDEX: 38.07 KG/M2 | HEART RATE: 71 BPM | HEIGHT: 64 IN | WEIGHT: 223 LBS | DIASTOLIC BLOOD PRESSURE: 66 MMHG | SYSTOLIC BLOOD PRESSURE: 118 MMHG

## 2019-11-11 DIAGNOSIS — C50.919 MALIGNANT NEOPLASM OF UNSPECIFIED SITE OF UNSPECIFIED FEMALE BREAST: ICD-10-CM

## 2019-11-11 LAB
GLUCOSE BLDC GLUCOMTR-MCNC: 189
HBA1C MFR BLD HPLC: 8.2

## 2019-11-11 PROCEDURE — 83036 HEMOGLOBIN GLYCOSYLATED A1C: CPT | Mod: QW

## 2019-11-11 PROCEDURE — 99214 OFFICE O/P EST MOD 30 MIN: CPT | Mod: 25

## 2019-11-11 PROCEDURE — 82962 GLUCOSE BLOOD TEST: CPT

## 2019-11-11 RX ORDER — BLOOD SUGAR DIAGNOSTIC
STRIP MISCELLANEOUS
Qty: 300 | Refills: 1 | Status: ACTIVE | COMMUNITY
Start: 2019-11-11 | End: 1900-01-01

## 2019-11-11 NOTE — PHYSICAL EXAM
[Alert] : alert [Well Nourished] : well nourished [No Acute Distress] : no acute distress [Normal Sclera/Conjunctiva] : normal sclera/conjunctiva [Well Developed] : well developed [EOMI] : extra ocular movement intact [Thyroid Not Enlarged] : the thyroid was not enlarged [No Proptosis] : no proptosis [Normal Oropharynx] : the oropharynx was normal [No Thyroid Nodules] : there were no palpable thyroid nodules [No Respiratory Distress] : no respiratory distress [Normal Rate] : heart rate was normal  [No Accessory Muscle Use] : no accessory muscle use [Clear to Auscultation] : lungs were clear to auscultation bilaterally [Normal S1, S2] : normal S1 and S2 [Regular Rhythm] : with a regular rhythm [Pedal Pulses Normal] : the pedal pulses are present [No Edema] : there was no peripheral edema [Normal Bowel Sounds] : normal bowel sounds [Not Distended] : not distended [Not Tender] : non-tender [Soft] : abdomen soft [Post Cervical Nodes] : posterior cervical nodes [Anterior Cervical Nodes] : anterior cervical nodes [Axillary Nodes] : axillary nodes [Normal] : normal and non tender [Spine Straight] : spine straight [No Spinal Tenderness] : no spinal tenderness [Normal Gait] : normal gait [Normal Strength/Tone] : muscle strength and tone were normal [No Stigmata of Cushings Syndrome] : no stigmata of cushings syndrome [No Rash] : no rash [Acanthosis Nigricans] : no acanthosis nigricans [Normal Reflexes] : deep tendon reflexes were 2+ and symmetric [No Tremors] : no tremors [Oriented x3] : oriented to person, place, and time

## 2019-11-11 NOTE — ASSESSMENT
[Hypoglycemia Management] : hypoglycemia management [FreeTextEntry1] : Patient is a 54-year-old female here for uncontrolled type 2 diabetes, hyperlipidemia, and thyroid nodules.\par \par #1 type 2 diabetes, uncontrolled\par A1c have been improving from one year ago.  We discussed GLP. treatment and patient would like to proceed with starting an additional agent for better glycemic control\par Recommended to continue with metformin 1000 b.i.d.\par Recommended continuing with Amaryl 2 mg twice daily\par Discuss hypoglycemic protocols.\par We will start Trulicity 0.75 mg once weekly.  2 sample packs were given to patient.\par Prescription sent to her pharmacy of choice.  Patient was told not to soak her medication yet until she is able to tolerate the sample pen.  Recommend to check glucose 2-3 times daily.\par Recommended nutritional referral the patient feels comfortable about diet.\par Discussed signs and symptoms of acute pancreatitis, discussed that GLP1 Receptor Agonist  is contraindicated in patients with a personal or family history of MTC or in patients with MEN 2, and in patients with a prior serious hypersensitivity reaction to GLP1 or any of the product components.\par Recommend annual eye and foot exam. \par  \par \par #2 low vitamin D level\par Check vitamin D level today.\par \par #3 hyperlipidemia\par Check fasting lipid panel.\par \par #4 history of breast cancer on hormone therapy.\par Recommendation to obtain bone mineral given that she is on anastrozole.\par \par #5 Thyroid nodule\par Repeat thyroid US to monitor thyroid nodules. \par  [Diabetes Foot Care] : diabetes foot care [Importance of Diet and Exercise] : importance of diet and exercise to improve glycemic control, achieve weight loss and improve cardiovascular health [Long Term Vascular Complications] : long term vascular complications of diabetes [Injection Technique, Storage, Sharps Disposal] : injection technique, storage, and sharps disposal [Retinopathy Screening] : Patient was referred to ophthalmology for retinopathy screening [Self Monitoring of Blood Glucose] : self monitoring of blood glucose

## 2019-11-11 NOTE — HISTORY OF PRESENT ILLNESS
[FreeTextEntry1] : Patient is a 54-year-old female here for followup for uncontrolled type 2 diabetes mellitus, thyroid nodules.\par \par She first establish care with me in July 2018.  She had not followup since.\par \par She has been very busy with work, also a lot health issues with her family member.  Unfortunately her mother passed away in January of this year.  Her  is recently in the hospital, status post neurosurgery, and now is being admitted for seizure activities.\par \par Regarding type 2 diabetes mellitus, her A1c when she first establish care here in July 2018 was 11.6%.  She is currently taking metformin 1000 b.i.d. and Amaryl 2 mg twice daily.  She denies any frequent hypoglycemia she does carry glucose tablets with her every day.  Her A1c today was repeated today and found to be 8.2%.  She states that her number has been improving over the years.  In the interim, she has been following up with her primary care doctor, who had urged patient to start insulin treatment.  She is very hesitant to be on insulin therapy.  She reports she hasn't been checking glucose that much.  Due to recent health issues of her family members.  When she checks.  Usually around 160 mg/dL.  She endorsed that her diet habits has not been the greatest.  But she does try to eat a much smaller portion.  24-hour diet history as below.\par \par She has no known diabetic retinopathy, neuropathy or nephropathy.\par \par She has no known microvascular disease such as CAT, CHF CVA in the past.\par \par She was diagnosed with breast cancer.  Status post treatment, she is currently taking anastrozole.  She reports she had no prior bone mineral density done in the past.\par \par Regarding hyperlipidemia, she takes atorvastatin.  She reports no myalgias.\par \par 24 Hour intake\par B: A fruit (banana or apple), water, mint/green tea without sugar\par L: Whatever she cooks the day for dinner. \par D: Margarita rice and meat; "make my own salad" packages.  \par \par Regarding thyroid nodule, in August 2018 patient had a thyroid ultrasound done.  She was found to have RIGHT lower pole nodule measuring 1 x 0.7 x 0.9 cm with benign morphology.  On the LEFT side, lower pole solid nodule measuring 3.1 x 2 x 2.5 cm.  Indeterminate.  No nausea or normal morphology cervical lymph nodes.  Fine-needle aspiration of the LEFT lower pole nodule was done in 08/20/18.  Pathology was benign.  Both report scan in chart.\par \par \par She reports that she's taking metformin 1000mg twice daily and Glimepiride 2mg twice daily.  \par \par She admits to poor diet.  Works as a teacher, emotional eater.  She's interested in seeing CDE and nutrition counseling. \par She reports that her mom passed away in Jan 2019. \par

## 2020-01-09 ENCOUNTER — APPOINTMENT (OUTPATIENT)
Dept: ULTRASOUND IMAGING | Facility: IMAGING CENTER | Age: 55
End: 2020-01-09
Payer: COMMERCIAL

## 2020-01-09 ENCOUNTER — OUTPATIENT (OUTPATIENT)
Dept: OUTPATIENT SERVICES | Facility: HOSPITAL | Age: 55
LOS: 1 days | End: 2020-01-09
Payer: COMMERCIAL

## 2020-01-09 ENCOUNTER — APPOINTMENT (OUTPATIENT)
Dept: MAMMOGRAPHY | Facility: IMAGING CENTER | Age: 55
End: 2020-01-09
Payer: COMMERCIAL

## 2020-01-09 DIAGNOSIS — Z98.51 TUBAL LIGATION STATUS: Chronic | ICD-10-CM

## 2020-01-09 DIAGNOSIS — Z98.891 HISTORY OF UTERINE SCAR FROM PREVIOUS SURGERY: Chronic | ICD-10-CM

## 2020-01-09 DIAGNOSIS — Z00.8 ENCOUNTER FOR OTHER GENERAL EXAMINATION: ICD-10-CM

## 2020-01-09 PROCEDURE — 76641 ULTRASOUND BREAST COMPLETE: CPT | Mod: 26,50

## 2020-01-09 PROCEDURE — G0279: CPT | Mod: 26

## 2020-01-09 PROCEDURE — 77066 DX MAMMO INCL CAD BI: CPT | Mod: 26

## 2020-01-09 PROCEDURE — 77066 DX MAMMO INCL CAD BI: CPT

## 2020-01-09 PROCEDURE — G0279: CPT

## 2020-01-09 PROCEDURE — 76641 ULTRASOUND BREAST COMPLETE: CPT

## 2020-01-14 ENCOUNTER — FORM ENCOUNTER (OUTPATIENT)
Age: 55
End: 2020-01-14

## 2020-01-15 ENCOUNTER — OUTPATIENT (OUTPATIENT)
Dept: OUTPATIENT SERVICES | Facility: HOSPITAL | Age: 55
LOS: 1 days | End: 2020-01-15
Payer: COMMERCIAL

## 2020-01-15 ENCOUNTER — APPOINTMENT (OUTPATIENT)
Dept: ULTRASOUND IMAGING | Facility: IMAGING CENTER | Age: 55
End: 2020-01-15
Payer: COMMERCIAL

## 2020-01-15 DIAGNOSIS — Z98.891 HISTORY OF UTERINE SCAR FROM PREVIOUS SURGERY: Chronic | ICD-10-CM

## 2020-01-15 DIAGNOSIS — Z98.51 TUBAL LIGATION STATUS: Chronic | ICD-10-CM

## 2020-01-15 DIAGNOSIS — E04.1 NONTOXIC SINGLE THYROID NODULE: ICD-10-CM

## 2020-01-15 PROCEDURE — 76536 US EXAM OF HEAD AND NECK: CPT | Mod: 26

## 2020-01-15 PROCEDURE — 76536 US EXAM OF HEAD AND NECK: CPT

## 2020-02-19 ENCOUNTER — FORM ENCOUNTER (OUTPATIENT)
Age: 55
End: 2020-02-19

## 2020-02-20 ENCOUNTER — RESULT REVIEW (OUTPATIENT)
Age: 55
End: 2020-02-20

## 2020-02-20 ENCOUNTER — APPOINTMENT (OUTPATIENT)
Dept: ULTRASOUND IMAGING | Facility: IMAGING CENTER | Age: 55
End: 2020-02-20
Payer: COMMERCIAL

## 2020-02-20 ENCOUNTER — OUTPATIENT (OUTPATIENT)
Dept: OUTPATIENT SERVICES | Facility: HOSPITAL | Age: 55
LOS: 1 days | End: 2020-02-20
Payer: COMMERCIAL

## 2020-02-20 DIAGNOSIS — Z98.51 TUBAL LIGATION STATUS: Chronic | ICD-10-CM

## 2020-02-20 DIAGNOSIS — Z98.891 HISTORY OF UTERINE SCAR FROM PREVIOUS SURGERY: Chronic | ICD-10-CM

## 2020-02-20 DIAGNOSIS — E04.1 NONTOXIC SINGLE THYROID NODULE: ICD-10-CM

## 2020-02-20 PROCEDURE — 88172 CYTP DX EVAL FNA 1ST EA SITE: CPT

## 2020-02-20 PROCEDURE — 10005 FNA BX W/US GDN 1ST LES: CPT

## 2020-02-20 PROCEDURE — 88173 CYTOPATH EVAL FNA REPORT: CPT

## 2020-02-20 PROCEDURE — 88173 CYTOPATH EVAL FNA REPORT: CPT | Mod: 26

## 2020-02-21 LAB — NON-GYNECOLOGICAL CYTOLOGY STUDY: SIGNIFICANT CHANGE UP

## 2020-02-26 ENCOUNTER — APPOINTMENT (OUTPATIENT)
Dept: ENDOCRINOLOGY | Facility: CLINIC | Age: 55
End: 2020-02-26
Payer: COMMERCIAL

## 2020-02-26 VITALS
BODY MASS INDEX: 39.02 KG/M2 | OXYGEN SATURATION: 99 % | DIASTOLIC BLOOD PRESSURE: 80 MMHG | SYSTOLIC BLOOD PRESSURE: 122 MMHG | HEIGHT: 63.25 IN | HEART RATE: 88 BPM | WEIGHT: 223 LBS

## 2020-02-26 VITALS — BODY MASS INDEX: 39.02 KG/M2 | WEIGHT: 223 LBS | HEIGHT: 63.25 IN

## 2020-02-26 DIAGNOSIS — M85.88 OTHER SPECIFIED DISORDERS OF BONE DENSITY AND STRUCTURE, OTHER SITE: ICD-10-CM

## 2020-02-26 PROCEDURE — 77085 DXA BONE DENSITY AXL VRT FX: CPT

## 2020-02-26 PROCEDURE — 99214 OFFICE O/P EST MOD 30 MIN: CPT | Mod: 25

## 2020-02-26 PROCEDURE — ZZZZZ: CPT

## 2020-02-26 RX ORDER — DULAGLUTIDE 0.75 MG/.5ML
0.75 INJECTION, SOLUTION SUBCUTANEOUS
Qty: 4 | Refills: 1 | Status: DISCONTINUED | COMMUNITY
Start: 2019-11-11 | End: 2020-02-26

## 2020-02-26 RX ORDER — GLIMEPIRIDE 2 MG/1
2 TABLET ORAL
Qty: 60 | Refills: 5 | Status: ACTIVE | COMMUNITY
Start: 2018-07-19

## 2020-03-05 LAB
25(OH)D3 SERPL-MCNC: 28.6 NG/ML
ALBUMIN SERPL ELPH-MCNC: 4.4 G/DL
ALP BLD-CCNC: 118 U/L
ALT SERPL-CCNC: 12 U/L
ANION GAP SERPL CALC-SCNC: 19 MMOL/L
AST SERPL-CCNC: 13 U/L
BILIRUB SERPL-MCNC: <0.2 MG/DL
BUN SERPL-MCNC: 11 MG/DL
CALCIUM SERPL-MCNC: 9.3 MG/DL
CHLORIDE SERPL-SCNC: 101 MMOL/L
CHOLEST SERPL-MCNC: 190 MG/DL
CHOLEST/HDLC SERPL: 3.1 RATIO
CO2 SERPL-SCNC: 23 MMOL/L
CREAT SERPL-MCNC: 0.67 MG/DL
CREAT SPEC-SCNC: 196 MG/DL
GLUCOSE BLDC GLUCOMTR-MCNC: 226
GLUCOSE SERPL-MCNC: 211 MG/DL
HBA1C MFR BLD HPLC: 9.5
HDLC SERPL-MCNC: 61 MG/DL
LDLC SERPL CALC-MCNC: 110 MG/DL
MICROALBUMIN 24H UR DL<=1MG/L-MCNC: <1.2 MG/DL
MICROALBUMIN/CREAT 24H UR-RTO: NORMAL MG/G
POTASSIUM SERPL-SCNC: 5 MMOL/L
PROT SERPL-MCNC: 7 G/DL
SODIUM SERPL-SCNC: 144 MMOL/L
TRIGL SERPL-MCNC: 93 MG/DL

## 2020-03-05 NOTE — RESULTS/DATA
[Hologic] : hologic [L1 - L4] : L1 - L4 [BMD ___ g/cm2] : BMD: [unfilled] g/cm2 [T-Score ___] : T-score: [unfilled] [Z-Score ___] : Z-score: [unfilled] [FreeTextEntry2] : February 26, 2020 [FreeTextEntry1] : No prior comparison\par \par 10-year fracture risk, major osteoporotic fracture 1.7% hip fracture less than 0.1%.\par Osteopenia

## 2020-03-05 NOTE — DATA REVIEWED
[FreeTextEntry1] : EXAM: MR BREAST WAWIC BI W CAD# \par PROCEDURE DATE: 02/20/2018 \par INTERPRETATION: CLINICAL INDICATION: New diagnosis of left breast ductal \par carcinoma in situ; for preoperative evaluation. \par \par TECHNIQUE: MRI of both breasts was performed using a dedicated breast coil \par and 1.5 Judy closed MRI. Axial non fat-suppressed T1, followed by dynamic \par axial T1 fat-suppressed images during and following administration of \par gadolinium were obtained. 10 cc of Gadavist was administered intravenously \par without complication and 0 cc were discarded. Axial fat suppressed T2 \par images were also obtained. Post processing including subtraction, 3-D \par reconstruction and kinetic enhancement analysis was performed on a dedicated \par Aubrey workstation. \par \par PRIOR BREAST MRI: None. \par CORRELATION STUDIES: Mammograms dated 12/8/2017, 9/12/2016, 1/23/2018; \par breast ultrasound dated 1/23/2018; stereotactic biopsy dated 1/23/2018. \par \par FINDINGS: The breast parenchyma is composed of scattered fibroglandular \par tissue. The breasts demonstrate mild to moderate background parenchymal \par enhancement. \par \par RIGHT BREAST: \par No suspicious enhancement. \par \par LEFT BREAST: \par Within the outer and slightly upper quadrant, anterior third depth, there is \par susceptibility artifact consistent with marker clip denoting site of \par biopsy-proven ductal carcinoma in situ (2:96). Anterosuperior to the clip is \par a postbiopsy hematoma measuring 1.8 x 1.7 cm, with linear enhancement \par extending to the skin, also consistent with postbiopsy change. Posterior to \par the clip, within the upper outer quadrant, middle third depth, there is \par focal clumped none mass enhancement spanning 2.9 x 2.4 cm (AP x TR) \par (91561:83 - 106). Total suspected disease spans 5.5 x 2.4 cm (AP x TR). \par \par AXILLA: \par Multiple prominent left axillary lymph nodes are noted measuring up to 1.5 x \par 0.9 cm (4:128). Unremarkable right axilla. \par \par OTHER: \par Left thyroid gland is enlarged measuring up to 4.0 x 2.5 cm (4:163). The \par left parotid gland is asymmetrically enlarged measuring up to 2.9 x 2.4 cm \par (4:191), with adjacent enlarged superior deep jugular and submental lymph \par nodes measuring up to 1.2 x 1.0 cm (4:616). \par \par IMPRESSION: \par 1. Newly diagnosed left breast ductal carcinoma in situ, with additional \par sites of suspected disease. If breast conservation is a consideration, \par MRI-guided biopsy of a distal area of suspected disease is recommended to \par evaluate for extent of disease (suggest 19254:[id number]). \par 2. Multiple prominent left axillary lymph nodes, for which targeted \par ultrasound and possible biopsy is recommended. \par 3. Enlarged parotid gland with multiple adjacent enlarged superior to \par jugular and submental lymph nodes. If clinically indicated, MRI of the neck \par with contrast may be obtained for further evaluation. \par 4. Enlarged left thyroid gland. If clinically indicated, thyroid ultrasound \par may be obtained for further evaluation. \par \par Findings and recommendations discussed with Dr. Skyla Kline on 2/21/2018. \par \par RECOMMENDATION: Biopsy recommended \par \par BI-RADS 4- Suspicious Finding(s) \par The patient will be mailed a written summary of these results in layman's \par terms. \par \par GUILHERME DIXON M.D., ATTENDING RADIOLOGIST \par This document has been electronically signed. Feb 23 2018 11:20AM \par \par \par \par

## 2020-03-05 NOTE — REVIEW OF SYSTEMS
[Fatigue] : fatigue [Negative] : Endocrine [FreeTextEntry1] : Patient reports feeling a little bit down, she felt that she needed to speak with a counselor.  She denies any SI HI

## 2020-03-05 NOTE — PHYSICAL EXAM
[No Acute Distress] : no acute distress [Alert] : alert [Well Developed] : well developed [Well Nourished] : well nourished [Normal Sclera/Conjunctiva] : normal sclera/conjunctiva [EOMI] : extra ocular movement intact [No Proptosis] : no proptosis [Normal Oropharynx] : the oropharynx was normal [No Thyroid Nodules] : there were no palpable thyroid nodules [Thyroid Not Enlarged] : the thyroid was not enlarged [No Respiratory Distress] : no respiratory distress [Clear to Auscultation] : lungs were clear to auscultation bilaterally [No Accessory Muscle Use] : no accessory muscle use [Normal S1, S2] : normal S1 and S2 [Normal Rate] : heart rate was normal  [Regular Rhythm] : with a regular rhythm [No Edema] : there was no peripheral edema [Pedal Pulses Normal] : the pedal pulses are present [Not Tender] : non-tender [Normal Bowel Sounds] : normal bowel sounds [Soft] : abdomen soft [Not Distended] : not distended [Anterior Cervical Nodes] : anterior cervical nodes [Post Cervical Nodes] : posterior cervical nodes [Axillary Nodes] : axillary nodes [No Stigmata of Cushings Syndrome] : no stigmata of cushings syndrome [Spine Straight] : spine straight [No Spinal Tenderness] : no spinal tenderness [Normal Gait] : normal gait [Normal Strength/Tone] : muscle strength and tone were normal [No Rash] : no rash [Full ROM] : with full range of motion [Normal] : normal [Normal Reflexes] : deep tendon reflexes were 2+ and symmetric [No Tremors] : no tremors [Oriented x3] : oriented to person, place, and time [Acanthosis Nigricans] : no acanthosis nigricans [Diminished Throughout Both Feet] : normal tactile sensation with monofilament testing throughout both feet

## 2020-03-05 NOTE — ASSESSMENT
[Carbohydrate Consistent Diet] : carbohydrate consistent diet [Hypoglycemia Management] : hypoglycemia management [Diabetes Foot Care] : diabetes foot care [Glucagon Use] : glucagon use [Importance of Diet and Exercise] : importance of diet and exercise to improve glycemic control, achieve weight loss and improve cardiovascular health [Long Term Vascular Complications] : long term vascular complications of diabetes [Self Monitoring of Blood Glucose] : self monitoring of blood glucose [Retinopathy Screening] : Patient was referred to ophthalmology for retinopathy screening [FreeTextEntry1] : Patient is a 54-year-old female here for uncontrolled type 2 diabetes, hyperlipidemia, and thyroid nodules.\par \par #1 type 2 diabetes, uncontrolled\par A1c have been improving from one year ago.  We discussed GLP. treatment and patient would like to proceed with starting an additional agent for better glycemic control\par Recommended to continue with metformin 1000 b.i.d.\par Recommended continuing with Amaryl 2 mg twice daily\par Discuss hypoglycemic protocols.\par Patient is willing to retrial GLP-1.  This time I will start her on Ozempic 0.25 mg once weekly for 4 weeks, then increase the dosage to 0.5 mg once weekly for another 4 weeks.  We will follow-up with patient closely in 2 months to see her progress.  Sample was then packets provided for patient.  Patient if able to tolerate this medication, will call our office and we will send her a prescription to the pharmacy of her choice.\par Prescription sent to her pharmacy of choice.  Patient was told not to soak her medication yet until she is able to tolerate the sample pen.  Recommend to check glucose 2-3 times daily.\par Recommended nutritional referral the patient feels comfortable about diet.\par Discussed signs and symptoms of acute pancreatitis, discussed that GLP1 Receptor Agonist  is contraindicated in patients with a personal or family history of MTC or in patients with MEN 2, and in patients with a prior serious hypersensitivity reaction to GLP1 or any of the product components.\par Recommend annual eye and foot exam. \par  \par \par #2 low vitamin D level\par Check vitamin D level today.\par \par #3 hyperlipidemia\par We will check lipid panel, difficult for patient to obtain fasting lipid panel\par \par #4 history of breast cancer on hormone therapy.\par Patient had a bone density done today due to a history of using anastrozole.\par Lumbar T score -1.1 total, femoral neck T score -0.8, total hip -0.41 third of distal radius -0.2\par We will continue to monitor bone density in 2 to 3 years.\par \par #5 Thyroid nodule\par Repeat thyroid US to monitor thyroid nodules. \par

## 2020-03-05 NOTE — HISTORY OF PRESENT ILLNESS
[FreeTextEntry1] : Patient is a 54-year-old female here for followup for uncontrolled type 2 diabetes mellitus, thyroid nodules.\par \par She first establish care with me in July 2018. \par \par She has been very busy with work, also a lot health issues with her family member.  Unfortunately her mother passed away in January of this year.  Her  is recently in the hospital, status post neurosurgery, and now is being admitted for seizure activities, and since then had been going back to hospital a lot for seizure.  He still has a lot of seizure activity.  \par \par Regarding type 2 diabetes mellitus, her A1c when she first establish care here in July 2018 was 11.6%.  She is currently taking metformin 1000 b.i.d. and Amaryl 2 mg twice daily.  She denies any frequent hypoglycemia she does carry glucose tablets with her every day.  Her A1c today was repeated today and found to be 9.5%.  Patient's A1c was 8.2% in November 2019.  Patient stated since last visit, this has been a lot of additional stresses in her household.  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  She reported that she felt extremely fatigued on the medication therefore she self discontinued that.  In the past, she had try Victoza, she denies any GI side effects.  However she does not like to retrial this medication again.\par  \par She has no known diabetic retinopathy, neuropathy or nephropathy.  She follows up with her ophthalmologist every 6 months.  There is no reported history of diabetic retinopathy.\par \par She has no known microvascular disease such as CAD CHF CVA in the past.\par \par She was diagnosed with breast cancer.  Status post treatment, she is currently taking anastrozole.  She reports she had no prior bone mineral density done in the past.\par \par Regarding hyperlipidemia, she takes atorvastatin.  She reports no myalgias.\par \par \par Regarding thyroid nodule, in August 2018 patient had a thyroid ultrasound done.  She was found to have RIGHT lower pole nodule measuring 1 x 0.7 x 0.9 cm with benign morphology.  On the LEFT side, lower pole solid nodule measuring 3.1 x 2 x 2.5 cm.  Indeterminate.  No nausea or normal morphology cervical lymph nodes.  Fine-needle aspiration of the LEFT lower pole nodule was done in 08/20/18.  Pathology was benign.  Both report scan in chart.  Patient had a repeat thyroid ultrasound in January 2020, it was noted that she had 101% increase on the left lower lobe nodule therefore she repeated FNA last week and the biopsy result was within normal limits.\par \par \par She reports that she's taking metformin 1000mg twice daily and Glimepiride 2mg twice daily.  \par \par She admits to poor diet.  Works as a teacher, emotional eater.  She's interested in seeing CDE and nutrition counseling. \par

## 2020-03-05 NOTE — ADDENDUM
[FreeTextEntry1] : Discussed LDL level of 110 mg/dL.  Patient is currently taking atorvastatin 10 mg once daily.  Recommend to increase to 20 mg once daily.  However patient would like to repeat LDL level while she is fasting.  Then reassess the need to increase her statin dose.

## 2020-04-30 ENCOUNTER — APPOINTMENT (OUTPATIENT)
Dept: ENDOCRINOLOGY | Facility: CLINIC | Age: 55
End: 2020-04-30

## 2020-07-13 ENCOUNTER — OUTPATIENT (OUTPATIENT)
Dept: OUTPATIENT SERVICES | Facility: HOSPITAL | Age: 55
LOS: 1 days | End: 2020-07-13
Payer: COMMERCIAL

## 2020-07-13 ENCOUNTER — APPOINTMENT (OUTPATIENT)
Dept: ULTRASOUND IMAGING | Facility: IMAGING CENTER | Age: 55
End: 2020-07-13
Payer: COMMERCIAL

## 2020-07-13 DIAGNOSIS — Z98.891 HISTORY OF UTERINE SCAR FROM PREVIOUS SURGERY: Chronic | ICD-10-CM

## 2020-07-13 DIAGNOSIS — Z98.51 TUBAL LIGATION STATUS: Chronic | ICD-10-CM

## 2020-07-13 DIAGNOSIS — Z12.73 ENCOUNTER FOR SCREENING FOR MALIGNANT NEOPLASM OF OVARY: ICD-10-CM

## 2020-07-13 PROCEDURE — 76856 US EXAM PELVIC COMPLETE: CPT | Mod: 26,59

## 2020-07-13 PROCEDURE — 76856 US EXAM PELVIC COMPLETE: CPT

## 2020-07-13 PROCEDURE — 76830 TRANSVAGINAL US NON-OB: CPT | Mod: 26

## 2020-07-13 PROCEDURE — 76830 TRANSVAGINAL US NON-OB: CPT

## 2020-07-21 ENCOUNTER — APPOINTMENT (OUTPATIENT)
Dept: RADIOLOGY | Facility: IMAGING CENTER | Age: 55
End: 2020-07-21
Payer: COMMERCIAL

## 2020-07-21 ENCOUNTER — OUTPATIENT (OUTPATIENT)
Dept: OUTPATIENT SERVICES | Facility: HOSPITAL | Age: 55
LOS: 1 days | End: 2020-07-21
Payer: COMMERCIAL

## 2020-07-21 DIAGNOSIS — M89.8X1 OTHER SPECIFIED DISORDERS OF BONE, SHOULDER: ICD-10-CM

## 2020-07-21 DIAGNOSIS — Z98.51 TUBAL LIGATION STATUS: Chronic | ICD-10-CM

## 2020-07-21 DIAGNOSIS — Z98.891 HISTORY OF UTERINE SCAR FROM PREVIOUS SURGERY: Chronic | ICD-10-CM

## 2020-07-21 DIAGNOSIS — Z00.8 ENCOUNTER FOR OTHER GENERAL EXAMINATION: ICD-10-CM

## 2020-07-21 PROCEDURE — 73000 X-RAY EXAM OF COLLAR BONE: CPT | Mod: 26,LT

## 2020-07-21 PROCEDURE — 73000 X-RAY EXAM OF COLLAR BONE: CPT

## 2020-10-09 ENCOUNTER — APPOINTMENT (OUTPATIENT)
Dept: ENDOCRINOLOGY | Facility: CLINIC | Age: 55
End: 2020-10-09

## 2020-11-26 ENCOUNTER — EMERGENCY (EMERGENCY)
Facility: HOSPITAL | Age: 55
LOS: 1 days | Discharge: HOME | End: 2020-11-26
Attending: EMERGENCY MEDICINE | Admitting: EMERGENCY MEDICINE
Payer: COMMERCIAL

## 2020-11-26 VITALS
OXYGEN SATURATION: 97 % | WEIGHT: 225.09 LBS | TEMPERATURE: 100 F | SYSTOLIC BLOOD PRESSURE: 163 MMHG | HEART RATE: 110 BPM | DIASTOLIC BLOOD PRESSURE: 95 MMHG | HEIGHT: 64 IN | RESPIRATION RATE: 18 BRPM

## 2020-11-26 VITALS
RESPIRATION RATE: 18 BRPM | TEMPERATURE: 100 F | SYSTOLIC BLOOD PRESSURE: 135 MMHG | HEART RATE: 102 BPM | DIASTOLIC BLOOD PRESSURE: 84 MMHG | OXYGEN SATURATION: 96 %

## 2020-11-26 DIAGNOSIS — R53.1 WEAKNESS: ICD-10-CM

## 2020-11-26 DIAGNOSIS — B34.9 VIRAL INFECTION, UNSPECIFIED: ICD-10-CM

## 2020-11-26 DIAGNOSIS — Z20.828 CONTACT WITH AND (SUSPECTED) EXPOSURE TO OTHER VIRAL COMMUNICABLE DISEASES: ICD-10-CM

## 2020-11-26 DIAGNOSIS — Z98.51 TUBAL LIGATION STATUS: Chronic | ICD-10-CM

## 2020-11-26 DIAGNOSIS — Z98.891 HISTORY OF UTERINE SCAR FROM PREVIOUS SURGERY: Chronic | ICD-10-CM

## 2020-11-26 LAB
FLU A RESULT: SIGNIFICANT CHANGE UP
FLU A RESULT: SIGNIFICANT CHANGE UP
FLUAV AG NPH QL: SIGNIFICANT CHANGE UP
FLUBV AG NPH QL: SIGNIFICANT CHANGE UP
RSV RESULT: SIGNIFICANT CHANGE UP
RSV RNA RESP QL NAA+PROBE: SIGNIFICANT CHANGE UP
SARS-COV-2 RNA SPEC QL NAA+PROBE: DETECTED

## 2020-11-26 PROCEDURE — 99284 EMERGENCY DEPT VISIT MOD MDM: CPT

## 2020-11-26 PROCEDURE — U0003: CPT

## 2020-11-26 PROCEDURE — 99283 EMERGENCY DEPT VISIT LOW MDM: CPT

## 2020-11-26 PROCEDURE — 87631 RESP VIRUS 3-5 TARGETS: CPT

## 2020-11-26 NOTE — ED PROVIDER NOTE - ATTENDING CONTRIBUTION TO CARE
Attending Statement: I have personally performed a face to face diagnostic evaluation on this patient. I have reviewed the ACP note and agree with the history, exam and plan of care, except as noted.     Attending Contribution to Care:  Pt presents with hx of low grade fever and viral symptoms/myalgias, +covid contact at home. Pt is well appearing and tolerating PO. No respiratory distress. VSS. Will swab for flu/covid. No indication for blood work or imaging at this time. Pt was instructed to self-quarantine for 14 days and verbalized understanding these instructions. Pt will be called with results.

## 2020-11-26 NOTE — ED ADULT NURSE NOTE - SUICIDE SCREENING QUESTION 1
Chief Complaint   Patient presents with   • Crohn's Disease   • Med Refill     Subjective     HPI  Adrienne Rodrigues is a 24 y.o. female who presents today for crohn's disease.  She carries diagnosis of CD since age 12, with involvement of terminal ileum.  Last seen May of this year, has missed a few follow ups.      She is currently managed on Humira injection at standard dose every 2 weeks.  She does report some cramping for day after Humira injections usually.  She continues to have some vague but intermittent abdominal pain, usually in lower abdomen but somewhat migratory.  Her CT scan done in May of this year showed overall improvement in the previously noted thickening/inflammation of distal ileum (reviewed).        Past Medical History:   Diagnosis Date   • Appendicitis     H/O SUBACUTE PERFORATED APPENDICITIS   • Asthma     RESCUE INHALER, HASN'T USED   • Crohn's disease (CMS/[facility])        Social History     Socioeconomic History   • Marital status: Single     Spouse name: Not on file   • Number of children: Not on file   • Years of education: Not on file   • Highest education level: Not on file   Tobacco Use   • Smoking status: Never Smoker   • Smokeless tobacco: Never Used   Substance and Sexual Activity   • Alcohol use: No   • Drug use: No   • Sexual activity: Defer         Current Outpatient Medications:   •  Adalimumab (HUMIRA PEN) 40 MG/0.4ML Pen-injector Kit, Inject 40 mg under the skin into the appropriate area as directed Every 14 (Fourteen) Days. **Please make an appointment for further refills, Disp: 2.4 mL, Rfl: 0  •  dicyclomine (BENTYL) 10 MG capsule, Take 1 capsule by mouth 3 (Three) Times a Day. (Patient taking differently: Take 10 mg by mouth 3 (Three) Times a Day As Needed.), Disp: 30 capsule, Rfl: 0  •  hyoscyamine (ANASPAZ,LEVSIN) 0.125 MG tablet, Take 1 tablet by mouth 4 (Four) Times a Day Before Meals & at Bedtime As Needed for Cramping., Disp: 120 tablet, Rfl: 5  •  lisdexamfetamine  (VYVANSE) 40 MG capsule, Take 40 mg by mouth Every Morning, Disp: , Rfl:   •  LO LOESTRIN FE 1 MG-10 MCG / 10 MCG tablet, Take 1 tablet by mouth daily., Disp: , Rfl:   •  ondansetron (ZOFRAN) 4 MG tablet, Take 1 tablet by mouth Every 6 (Six) Hours As Needed for Nausea or Vomiting., Disp: 12 tablet, Rfl: 0  •  TRINTELLIX 10 MG tablet, , Disp: , Rfl:     Review of Systems   Constitutional: Positive for fatigue.   Respiratory: Negative.    Cardiovascular: Negative.    Gastrointestinal: Positive for abdominal pain.       Objective   Vitals:    12/11/19 0925   BP: 118/76   Temp: 97.4 °F (36.3 °C)       Physical Exam   Constitutional: She is oriented to person, place, and time. She appears well-developed and well-nourished.   HENT:   Head: Normocephalic and atraumatic.   Abdominal: Soft. Bowel sounds are normal. She exhibits no distension and no mass. There is no tenderness. No hernia.   Neurological: She is alert and oriented to person, place, and time.   Skin: Skin is warm and dry.   Psychiatric: She has a normal mood and affect. Her behavior is normal. Judgment and thought content normal.   Vitals reviewed.      Assessment/Plan   Assessment:     1. Crohn's disease of both small and large intestine with abscess (CMS/HCC)    2. Right lower quadrant abdominal pain      Plan:   Recommend we check Crohn's Monitr panel and ADA levels/antibodies prior to next Humira dose.  Uncertain if her ongoing vague symptoms are representative of ongoing disease activity or maybe an underlying functional component.    She needs routine labs today in office  She has not had Flu shot yet this year - she needs this every year without fail, and I addressed this with her.  She has been vaccinated for HBV.  She needs Prevnar vaccine followed by Pneumovax vaccine 8-10 weeks later, I have asked her to see her PCP to get this arranged.        Vaibhav Meléndez M.D.  [facility]  [address] [place] [address] - [address]  [place]  [address]  Office: [phone]   No

## 2020-11-26 NOTE — ED PROVIDER NOTE - CLINICAL SUMMARY MEDICAL DECISION MAKING FREE TEXT BOX
well appearing, non toxic, hemodynamically stable (tachy in triage but admits to being nervous), c/o myalgias and malaise x few d - requesting covid test (mother in law tested positive), no resp c/o, no cough/cp/sob, lungs cta, covid and flu sent, no indication for further testing or labs at this time, symptom control and supportive care discussed, pt understands and agree w/plan, strict return precautions given

## 2020-11-26 NOTE — ED PROVIDER NOTE - RESPIRATORY, MLM
Breath sounds clear and equal bilaterally. no rales, no rhonchi, no wheezes b/l. speaking in long, full sentences

## 2020-11-26 NOTE — ED PROVIDER NOTE - PATIENT PORTAL LINK FT
You can access the FollowMyHealth Patient Portal offered by [facility] by registering at the following website: [email or web address]. By joining [employer]’s FollowMyHealth portal, you will also be able to view your health information using other applications (apps) compatible with our system.

## 2020-11-26 NOTE — ED PROVIDER NOTE - OBJECTIVE STATEMENT
The pt is a 54 y/o F, who presents to ED requesting covid test - states malaise and myalgias x few d. States that has taken tyl a few times w/relief. Concerned because mother in law tested covid+. Pt w/hx of lumpectomy and radiation tx in 2018 for breast ca w/o recurrence.  Denies cough, cp, sob, n/v/d, abd pain, sore throat, fever

## 2020-11-26 NOTE — ED PROVIDER NOTE - NSFOLLOWUPINSTRUCTIONS_ED_ALL_ED_FT
COVID-19 (Coronavirus Disease 2019)  WHAT YOU NEED TO KNOW:  COVID-19 is the disease caused by the novel (new) coronavirus first discovered in December 2019. Coronaviruses generally cause upper respiratory (nose, throat, and lung) infections, such as a cold. The new virus can also cause serious lower respiratory conditions, such as pneumonia or acute respiratory distress syndrome (ARDS). Anyone can develop serious problems from the new virus, but your risk is higher if you are 65 or older. A weak immune system, diabetes, or a heart or lung condition can also increase your risk.  DISCHARGE INSTRUCTIONS:  If you think you or someone you know may be infected: Do the following to protect others:   •If emergency care is needed, tell the  about the possible infection, or call ahead and tell the emergency department.  •Call a healthcare provider for instructions if symptoms are mild. Anyone who may be infected should not arrive without calling first. The provider will need to protect staff members and other patients.  •The person who may be infected needs to wear a face covering while getting medical care. This will help lower the risk of infecting others. Coverings are not used for anyone who is younger than 2 years, has breathing problems, or cannot remove it. The provider can give you instructions for anyone who cannot wear a covering.  Call your local emergency number (911 in the [place]) or go to the emergency department if:   •You have trouble breathing or shortness of breath at rest.  •You have chest pain or pressure that lasts longer than 5 minutes.  •You become confused or hard to wake.  •Your lips or face are blue.  •You have a fever of 104°F (40°C) or higher.  Call your doctor if:   •You do not have symptoms of COVID-19 but had close physical contact within 14 days with someone who tested positive.  •You have questions or concerns about your condition or care.  Medicines: You may need any of the following for mild symptoms:   •Decongestants help reduce nasal congestion and help you breathe more easily. If you take decongestant pills, they may make you feel restless or cause problems with your sleep. Do not use decongestant sprays for more than a few days.  •Cough suppressants help reduce coughing. Ask your healthcare provider which type of cough medicine is best for you.  •Acetaminophen decreases pain and fever. It is available without a doctor's order. Ask how much to take and how often to take it. Follow directions. Read the labels of all other medicines you are using to see if they also contain acetaminophen, or ask your doctor or pharmacist. Acetaminophen can cause liver damage if not taken correctly. Do not use more than 4 grams (4,000 milligrams) total of acetaminophen in one day.   •NSAIDs, such as ibuprofen, help decrease swelling, pain, and fever. NSAIDs can cause stomach bleeding or kidney problems in certain people. If you take blood thinner medicine, always ask your healthcare provider if NSAIDs are safe for you. Always read the medicine label and follow directions.  •Take your medicine as directed. Contact your healthcare provider if you think your medicine is not helping or if you have side effects. Tell him or her if you are allergic to any medicine. Keep a list of the medicines, vitamins, and herbs you take. Include the amounts, and when and why you take them. Bring the list or the pill bottles to follow-up visits. Carry your medicine list with you in case of an emergency.  How the 2019 coronavirus spreads: The virus spreads quickly and easily. You can become infected if you are in contact with a large amount of the virus, even for a short time. You can also become infected by being around a small amount of virus for a long time. The following are ways the virus is thought to spread, but more information may be coming:   •Droplets are the most common way all coronaviruses spread. The virus can travel in droplets that form when a person talks, coughs, or sneezes. Anyone who breathes in the droplets or gets them in his or her eyes can become infected with the virus. Close personal contact with an infected person is thought to be the main way the virus spreads. Close personal contact means you are within 6 feet (2 meters) of the person.  •Person-to-person contact can spread the virus. For example, a person with the virus on his or her hands can spread it by shaking hands with someone. At this time, it does not appear that the virus can be passed to a baby during pregnancy or delivery. The baby can be infected after he or she is born through person-to-person contact. The virus also does not appear to spread in breast milk. If you are pregnant or breastfeeding, talk to your healthcare provider or obstetrician about any concerns you have.  •The virus can stay on objects and surfaces. A person can get the virus on his or her hands by touching the object or surface. Infection happens if the person then touches his or her eyes or mouth with unwashed hands. It is not yet known how long the virus can stay on an object or surface. That is why it is important to clean all surfaces that are used regularly.  •An infected animal may be able to infect a person who touches it. This may happen at live markets or on a farm.  How everyone can lower the risk for COVID-19: The best way to prevent infection is to avoid anyone who is infected, but this can be hard to do. An infected person can spread the virus before signs or symptoms begin, or even if signs or symptoms never develop. The following can help lower the risk for infection:   Limit the Spread of Infectious Disease  •Wash your hands often throughout the day. Use soap and water. Rub your soapy hands together, lacing your fingers. Wash the front and back of each hand, and in between your fingers. Use the fingers of one hand to scrub under the fingernails of the other hand. Wash for at least 20 seconds. Rinse with warm, running water for several seconds. Then dry your hands with a clean towel or paper towel. Use hand  that contains alcohol if soap and water are not available. Do not touch your eyes, nose, or mouth without washing your hands first. Teach children how to wash their hands and use hand .  Handwashing  •Cover a sneeze or cough. This prevents droplets from traveling from you to others. Turn your face away and cover your mouth and nose with a tissue. Throw the tissue away. Use the bend of your arm if a tissue is not available. Then wash your hands well with soap and water or use hand . Turn and cover your face if you are around someone who is sneezing or coughing. Teach children how to cover a cough or sneeze.  •Follow worldwide, national, and local social distancing guidelines. Social distancing means people avoid close physical contact so the virus cannot spread from one person to another. Keep at least 6 feet (2 meters) between you and others. Also keep this distance from anyone who comes to your home, such as someone making a delivery.  •Make a habit of not touching your face. It is not known how long the virus can stay on objects and surfaces. If you get the virus on your hands, you can transfer it to your eyes, nose, or mouth and become infected. You can also transfer it to objects, surfaces, or people. Be aware of what you touch when you go out. Examples include handrails and elevator buttons. Try not to touch anything with bare hands unless it is necessary. Wash your hands before you leave your home and when you return.    •Clean and disinfect high-touch surfaces and objects often. Use a disinfecting solution or wipes. You can make a solution by diluting 4 teaspoons of bleach in 1 quart (4 cups) of water. Clean and disinfect even if you think no one living in or coming to your home is infected with the virus. You can wipe items with a disinfecting cloth before you bring them into your home. Wash your hands after you handle anything you bring into your home.      •Make your immune system as healthy as possible. A weakened immune system makes you more vulnerable to the new coronavirus. No COVID-19 vaccine is available yet. Vaccines such as the flu and pneumonia vaccines can help your immune system. Your healthcare provider can tell you which vaccines to get, and when to get them. Keep your immune system as strong as possible. Do not smoke. Eat healthy foods, exercise regularly, and try to manage stress. Go to bed and wake up at the same times each day.   Healthy Foods  Social distancing guidelines: National and local social distancing rules vary. Rules may change over time as restrictions are lifted. Restrictions may return if an outbreak happens where you live. It is important to know and follow all current social distancing rules in your area. The following are general guidelines:  •Limit trips out of your home. You may be able to have food, medicines, and other supplies delivered. If possible, have delivered items left at your door or other area. Try not to have someone hand you an item. You will be so close to the person that the virus can spread between you.  •Do not have close physical contact with anyone who does not live in your home. Do not shake hands with, hug, or kiss a person as a greeting. Stand or walk as far from others as possible. If you must use public transportation (such as a bus or subway), try to sit or stand away from others. You can stay safely connected with others through phone calls, e-mail messages, social media websites, and video chats. Check in on anyone who may be having a hard time socially distancing, or who lives alone. Ask administrators at nursing homes or long-term care facilities how you can safely communicate with someone living there.  •Wear a cloth face covering around others who do not live in your home. Face coverings help prevent the virus from spreading to others in droplets. You can use a clear face covering if someone needs to read your lips. This is a cloth covering that has plastic over the mouth area so your lips can be seen. Do not use coverings that have breathing valves or vents. The virus can travel out of the valve or vent and be spread to others. Do not take your covering off to talk, cough, or sneeze. Do not use coverings on children younger than 2 years or on anyone who has breathing problems or cannot remove it.  •Only allow medical or other necessary professionals into your home. Wear your face covering, and remind professionals to wear a face covering. Remind them to wash their hands when they arrive and before they leave. Do not let anyone who does not live in your home in, even if the person is not sick. A person can pass the virus to others before symptoms of COVID-19 begin. Some people never even develop symptoms. Children commonly have mild symptoms or no symptoms. It may be hard to tell a child not to hug or kiss you. Explain that this is how he or she can help you stay healthy.  •Do not go to someone else's home unless it is necessary. Do not go over to visit, even if the person is lonely. Only go if you need to help him or her. Make sure you both wear face coverings while you are there.  •Avoid large gatherings and crowds. Gatherings or crowds of 10 or more individuals can cause the virus to spread. Examples of gatherings include parties, sporting events, Buddhist services, and conferences. Crowds may form at beaches, horn, and tourist attractions. Protect yourself by staying away from large gatherings and crowds.  •Ask your healthcare provider for other ways to have appointments. You may be able to have appointments without having to go into the provider's office. Some providers offer phone, video, or other types of appointments. You may also be able to get prescriptions for a few months of your medicines at a time.  •Stay safe if you must go out to work. You may have a job that can only be done outside your home. Keep physical distance between you and other workers as much as possible. Follow your employer's rules so everyone stays safe.  If you have COVID-19 and are recovering at home: Healthcare providers will give you specific instructions to follow. The following are general guidelines to remind you how to keep others safe until you are well:   •Wash your hands often. Use soap and water as much as possible. You can use hand  that contains alcohol if soap and water are not available. Do not share towels with anyone. If you use paper towels, throw them away in a lined trash can kept in your room or area. Use a covered trash can, if possible.  •Do not go out of your home unless it is necessary. You may have to go to your healthcare provider's office for check-ups or to get prescription refills. Do not arrive at the provider's office without an appointment. Providers have to make their offices safe for staff and other patients.  •Do not have close physical contact with anyone unless it is necessary. Only have close physical contact with a person giving direct care, or a baby or child you must care for. Family members and friends should not visit you. If possible, stay in a separate area or room of your home if you live with others. No one should go into the area or room except to give you care. You can visit with others by phone, video chat, e-mail, or similar systems. It is important to stay connected with others in your life while you recover.  •Wear a face covering while others are near you. This can help prevent droplets from spreading the virus when you talk, sneeze, or cough. Put the covering on before anyone comes into your room or area. Remind the person to cover his or her nose and mouth before going in to provide care for you.  •Do not share items. Do not share dishes, towels, or other items with anyone. Items need to be washed after you use them.  •Protect your baby. Wash your hands with soap and water often throughout the day. Wear a clean face covering while you breastfeed, or while you express or pump breast milk. If possible, ask someone who is well to care for your baby. You can put breast milk in bottles for the person to use, if needed. Talk to your healthcare provider if you have any questions or concerns about caring for or bonding with your baby. He or she will tell you when to bring your baby in for check-ups and vaccines. He or she will also tell you what to do if you think your baby was infected with the new virus.  •Do not handle live animals. Until more is known, it is best not to touch, play with, or handle live animals. Some animals, including pets, have been infected with the new coronavirus. Do not handle or care for animals until you are well. Care includes feeding, petting, and cuddling your pet. Do not let your pet lick you or share your food. Ask someone who is not infected to take care of your pet, if possible. If you must care for a pet, wear a face covering. Wash your hands before and after you give care.  •Follow directions from your healthcare provider for being around others after you recover. You will need to wait at least 10 days after symptoms first appeared. Then you will need to have no fever for 24 hours without fever medicine, and no other symptoms. A loss of taste or smell may continue for several months. It is considered okay to be around others if this is your only symptom. It is not known for sure if or for how long a recovered person can pass the virus to others. Your provider may give you instructions, such as continuing social distancing or wearing a face covering around others.  How to take care of someone who has COVID-19: If the person lives in another home, arrange for a time to give care. Remember to bring a few pairs of disposable gloves and a cloth face covering. The following are general guidelines to help you safely care for anyone who has COVID-19:  •Wash your hands often. Wash before and after you go into the person's home, area, or room. Throw paper towels away in a lined trash can that has a lid, if possible.  •Do not allow others to go near the person. No one should come into the person's home unless it is necessary. If possible, the person should be in a separate area or room if he or she lives with others. Keep the room's door shut unless you need to go in or out. Have others call, video chat, or e-mail the person if he or she is feeling well enough. The person may feel lonely if he or she is kept separate for a long period of time. Safe communication can help him or her stay connected to family and friends.  •Make sure the person's room has good air flow. You may be able to open the window if the weather allows. An air conditioner can also be turned on to help air move.  •Contact the person before you go in to give care. Make sure the person is wearing a face covering. Remind him or her to wash his or her hands with soap and water. He or she can use hand  that contains alcohol if soap and water are not available. Put on a face covering before you go in to give care.  •Wear gloves while you give care and clean. Clean items the person uses often. Clean countertops, cooking surfaces, and the fronts and insides of the microwave and refrigerator. Clean the shower, toilet, the area around the toilet, the sink, the area around the sink, and faucets. Gather used laundry or bedding. Wash and dry items on the warmest settings the fabric allows. Wash dishes and silverware in hot, soapy water or in a .  •Anything you throw away needs to go into a lined trash can. When you need to empty the trash, close the open end of the lining and tie it closed. This helps prevent items the virus is on from spilling out of the trash. Remove your gloves and throw them away. Wash your hands.  Follow up with your doctor as directed: Write down your questions so you remember to ask them during your visits.  For more information:   •Centers for Disease Control and Prevention  [address] [place] [address]  Phone: 1-614.253.9316  Web Address: http://www.cdc.gov

## 2020-11-26 NOTE — ED ADULT NURSE NOTE - OBJECTIVE STATEMENT
56 y/o female came in to ED for complaint of body aches, weakness, cough and low grade fever at home. Pt reports family member tested positive for COVID 19.

## 2021-01-19 ENCOUNTER — RESULT REVIEW (OUTPATIENT)
Age: 56
End: 2021-01-19

## 2021-03-05 ENCOUNTER — APPOINTMENT (OUTPATIENT)
Dept: ULTRASOUND IMAGING | Facility: IMAGING CENTER | Age: 56
End: 2021-03-05

## 2021-03-05 ENCOUNTER — OUTPATIENT (OUTPATIENT)
Dept: OUTPATIENT SERVICES | Facility: HOSPITAL | Age: 56
LOS: 1 days | End: 2021-03-05
Payer: COMMERCIAL

## 2021-03-05 ENCOUNTER — APPOINTMENT (OUTPATIENT)
Dept: MAMMOGRAPHY | Facility: IMAGING CENTER | Age: 56
End: 2021-03-05
Payer: COMMERCIAL

## 2021-03-05 DIAGNOSIS — Z98.891 HISTORY OF UTERINE SCAR FROM PREVIOUS SURGERY: Chronic | ICD-10-CM

## 2021-03-05 DIAGNOSIS — Z00.8 ENCOUNTER FOR OTHER GENERAL EXAMINATION: ICD-10-CM

## 2021-03-05 DIAGNOSIS — Z98.51 TUBAL LIGATION STATUS: Chronic | ICD-10-CM

## 2021-03-05 PROCEDURE — 76641 ULTRASOUND BREAST COMPLETE: CPT | Mod: 26,50

## 2021-03-05 PROCEDURE — G0279: CPT

## 2021-03-05 PROCEDURE — 76641 ULTRASOUND BREAST COMPLETE: CPT

## 2021-03-05 PROCEDURE — 77066 DX MAMMO INCL CAD BI: CPT

## 2021-03-05 PROCEDURE — G0279: CPT | Mod: 26

## 2021-03-05 PROCEDURE — 77066 DX MAMMO INCL CAD BI: CPT | Mod: 26

## 2021-06-12 NOTE — PRE-ANESTHESIA EVALUATION ADULT - NSANTHOSAYNRD_GEN_A_CORE
unable to answer, patient is intubated No. WON screening performed.  STOP BANG Legend: 0-2 = LOW Risk; 3-4 = INTERMEDIATE Risk; 5-8 = HIGH Risk

## 2021-11-18 ENCOUNTER — APPOINTMENT (OUTPATIENT)
Dept: NEUROLOGY | Facility: CLINIC | Age: 56
End: 2021-11-18
Payer: COMMERCIAL

## 2021-11-18 VITALS
HEART RATE: 78 BPM | WEIGHT: 188 LBS | DIASTOLIC BLOOD PRESSURE: 82 MMHG | SYSTOLIC BLOOD PRESSURE: 132 MMHG | BODY MASS INDEX: 32.9 KG/M2 | HEIGHT: 63.25 IN

## 2021-11-18 DIAGNOSIS — I65.09 OCCLUSION AND STENOSIS OF UNSPECIFIED VERTEBRAL ARTERY: ICD-10-CM

## 2021-11-18 PROCEDURE — 99203 OFFICE O/P NEW LOW 30 MIN: CPT

## 2021-11-20 PROBLEM — I65.09 VERTEBRAL ARTERY STENOSIS: Status: ACTIVE | Noted: 2018-08-23

## 2021-11-20 RX ORDER — BETAMETHASONE DIPROPIONATE 0.5 MG/G
0.05 CREAM TOPICAL
Qty: 45 | Refills: 0 | Status: ACTIVE | COMMUNITY
Start: 2021-06-29

## 2021-11-20 RX ORDER — FLUOCINONIDE 0.5 MG/ML
0.05 SOLUTION TOPICAL
Qty: 120 | Refills: 0 | Status: ACTIVE | COMMUNITY
Start: 2021-01-29

## 2021-11-20 RX ORDER — METFORMIN HYDROCHLORIDE 1000 MG/1
1000 TABLET, COATED ORAL
Qty: 180 | Refills: 0 | Status: ACTIVE | COMMUNITY
Start: 2021-04-21

## 2021-11-20 RX ORDER — NYSTATIN AND TRIAMCINOLONE ACETONIDE 100000; 1 MG/G; MG/G
100000-0.1 CREAM TOPICAL
Qty: 60 | Refills: 0 | Status: ACTIVE | COMMUNITY
Start: 2021-06-29

## 2021-11-20 NOTE — ASSESSMENT
[FreeTextEntry1] : Wear wrist splints.  Good glycemic control.  Advised to obtain MRI results of brain imaging she had 3 years ago and EMG results from [facility] for my review and return for follow-up in 3 months.

## 2021-11-20 NOTE — PHYSICAL EXAM
[FreeTextEntry1] : Except for Tinel and Phalen signs at the wrists a detailed neurologic examination is normal.

## 2021-11-20 NOTE — HISTORY OF PRESENT ILLNESS
[FreeTextEntry1] : 56-year-old woman with history of diabetes mellitus type 2 and history of breast cancer status post mastectomy and remains on anastrozole requests neurologic evaluation.  She was seen several years ago by Dr. Tomi Farfan, neurologist at [facility] who diagnosed her with bilateral carpal tunnel syndrome.  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

## 2021-12-06 ENCOUNTER — APPOINTMENT (OUTPATIENT)
Dept: NEUROLOGY | Facility: CLINIC | Age: 56
End: 2021-12-06

## 2021-12-28 ENCOUNTER — APPOINTMENT (OUTPATIENT)
Dept: NEUROLOGY | Facility: CLINIC | Age: 56
End: 2021-12-28
Payer: COMMERCIAL

## 2021-12-28 PROCEDURE — 99213 OFFICE O/P EST LOW 20 MIN: CPT | Mod: 95

## 2021-12-28 RX ORDER — GABAPENTIN 300 MG/1
300 CAPSULE ORAL
Qty: 30 | Refills: 2 | Status: ACTIVE | COMMUNITY
Start: 2021-12-28 | End: 1900-01-01

## 2021-12-28 NOTE — HISTORY OF PRESENT ILLNESS
[FreeTextEntry1] : 56-year-old woman seen 1 month ago with bilateral carpal tunnel syndrome.  Advised to wear wrist splints at bedtime.  She reports she is having increasing tingling and numbness and wakes up at times with burning sensation in both hands.

## 2021-12-28 NOTE — PHYSICAL EXAM
[FreeTextEntry1] : No cognitive or communication deficits.  No focal deficits observed on video.  Phalen sign is present bilaterally and elicited when asked to flex both wrists.

## 2021-12-28 NOTE — REASON FOR VISIT
[Home] : at home, [unfilled] , at the time of the visit. [Medical Office: ([facility])___] : at the medical office located in  [Verbal consent obtained from patient] : the patient, [unfilled] [Follow-Up: _____] : a [unfilled] follow-up visit

## 2021-12-28 NOTE — ASSESSMENT
[FreeTextEntry1] : Encourage good glycemic control.  Start gabapentin 300 mg at bedtime for her neuropathic pain.  Continue to wear wrist splints at night.  Obtain hand surgeon consultation when omicron pandemic subsides.  Call me in 1 week regarding response to gabapentin.  Follow-up appointment in 6 months.

## 2022-01-26 ENCOUNTER — NON-APPOINTMENT (OUTPATIENT)
Age: 57
End: 2022-01-26

## 2022-01-26 ENCOUNTER — APPOINTMENT (OUTPATIENT)
Dept: ORTHOPEDIC SURGERY | Facility: CLINIC | Age: 57
End: 2022-01-26
Payer: COMMERCIAL

## 2022-01-26 VITALS
DIASTOLIC BLOOD PRESSURE: 83 MMHG | BODY MASS INDEX: 33.31 KG/M2 | HEIGHT: 63 IN | SYSTOLIC BLOOD PRESSURE: 138 MMHG | WEIGHT: 188 LBS | HEART RATE: 81 BPM | OXYGEN SATURATION: 95 %

## 2022-01-26 DIAGNOSIS — G56.23 LESION OF ULNAR NERVE, BILATERAL UPPER LIMBS: ICD-10-CM

## 2022-01-26 PROCEDURE — 99204 OFFICE O/P NEW MOD 45 MIN: CPT | Mod: NC,25

## 2022-01-26 PROCEDURE — 20526 THER INJECTION CARP TUNNEL: CPT | Mod: 50

## 2022-01-27 ENCOUNTER — APPOINTMENT (OUTPATIENT)
Dept: ORTHOPEDIC SURGERY | Facility: CLINIC | Age: 57
End: 2022-01-27

## 2022-01-27 ENCOUNTER — RESULT REVIEW (OUTPATIENT)
Age: 57
End: 2022-01-27

## 2022-03-03 ENCOUNTER — APPOINTMENT (OUTPATIENT)
Dept: ORTHOPEDIC SURGERY | Facility: CLINIC | Age: 57
End: 2022-03-03
Payer: COMMERCIAL

## 2022-03-10 ENCOUNTER — APPOINTMENT (OUTPATIENT)
Dept: ORTHOPEDIC SURGERY | Facility: CLINIC | Age: 57
End: 2022-03-10
Payer: COMMERCIAL

## 2022-03-10 PROCEDURE — 20526 THER INJECTION CARP TUNNEL: CPT | Mod: 50

## 2022-03-10 PROCEDURE — 99214 OFFICE O/P EST MOD 30 MIN: CPT | Mod: 25

## 2022-03-15 ENCOUNTER — APPOINTMENT (OUTPATIENT)
Dept: NEUROLOGY | Facility: CLINIC | Age: 57
End: 2022-03-15
Payer: COMMERCIAL

## 2022-03-15 VITALS
HEART RATE: 90 BPM | DIASTOLIC BLOOD PRESSURE: 77 MMHG | TEMPERATURE: 98.6 F | OXYGEN SATURATION: 98 % | SYSTOLIC BLOOD PRESSURE: 120 MMHG | HEIGHT: 63 IN | BODY MASS INDEX: 34.91 KG/M2 | WEIGHT: 197 LBS

## 2022-03-15 PROCEDURE — 99212 OFFICE O/P EST SF 10 MIN: CPT

## 2022-03-16 NOTE — HISTORY OF PRESENT ILLNESS
[FreeTextEntry1] : 56-year-old woman seen 3 month ago with bilateral carpal tunnel syndrome.  Patient reports no benefit from gabapentin, wrist splints or steroid injections.  She is scheduled for carpal tunnel release next month.

## 2022-03-16 NOTE — ASSESSMENT
[FreeTextEntry1] : She will return for follow-up if any neurologic problems persist following bilateral carpal tunnel release.

## 2022-03-16 NOTE — PHYSICAL EXAM
[FreeTextEntry1] : Except for Tinel and Phalen signs at the wrists a detailed neurologic examination remains normal.

## 2022-03-29 ENCOUNTER — APPOINTMENT (OUTPATIENT)
Dept: ENDOCRINOLOGY | Facility: CLINIC | Age: 57
End: 2022-03-29
Payer: COMMERCIAL

## 2022-03-29 VITALS
RESPIRATION RATE: 16 BRPM | TEMPERATURE: 97.9 F | HEIGHT: 63 IN | DIASTOLIC BLOOD PRESSURE: 82 MMHG | WEIGHT: 194 LBS | SYSTOLIC BLOOD PRESSURE: 130 MMHG | HEART RATE: 87 BPM | BODY MASS INDEX: 34.38 KG/M2 | OXYGEN SATURATION: 98 %

## 2022-03-29 DIAGNOSIS — E78.00 PURE HYPERCHOLESTEROLEMIA, UNSPECIFIED: ICD-10-CM

## 2022-03-29 DIAGNOSIS — E04.1 NONTOXIC SINGLE THYROID NODULE: ICD-10-CM

## 2022-03-29 DIAGNOSIS — E11.65 TYPE 2 DIABETES MELLITUS WITH HYPERGLYCEMIA: ICD-10-CM

## 2022-03-29 DIAGNOSIS — R79.89 OTHER SPECIFIED ABNORMAL FINDINGS OF BLOOD CHEMISTRY: ICD-10-CM

## 2022-03-29 PROCEDURE — 99205 OFFICE O/P NEW HI 60 MIN: CPT | Mod: 25

## 2022-03-29 PROCEDURE — 77085 DXA BONE DENSITY AXL VRT FX: CPT

## 2022-03-29 PROCEDURE — 83036 HEMOGLOBIN GLYCOSYLATED A1C: CPT | Mod: QW

## 2022-03-29 PROCEDURE — ZZZZZ: CPT

## 2022-03-29 NOTE — PHYSICAL EXAM
[Normal] : normal [Full ROM] : with full range of motion [Diminished Throughout Both Feet] : normal tactile sensation with monofilament testing throughout both feet

## 2022-03-30 ENCOUNTER — APPOINTMENT (OUTPATIENT)
Dept: MAMMOGRAPHY | Facility: IMAGING CENTER | Age: 57
End: 2022-03-30
Payer: COMMERCIAL

## 2022-03-30 ENCOUNTER — APPOINTMENT (OUTPATIENT)
Dept: ULTRASOUND IMAGING | Facility: IMAGING CENTER | Age: 57
End: 2022-03-30
Payer: COMMERCIAL

## 2022-03-30 ENCOUNTER — OUTPATIENT (OUTPATIENT)
Dept: OUTPATIENT SERVICES | Facility: HOSPITAL | Age: 57
LOS: 1 days | End: 2022-03-30
Payer: COMMERCIAL

## 2022-03-30 DIAGNOSIS — Z98.891 HISTORY OF UTERINE SCAR FROM PREVIOUS SURGERY: Chronic | ICD-10-CM

## 2022-03-30 DIAGNOSIS — Z00.8 ENCOUNTER FOR OTHER GENERAL EXAMINATION: ICD-10-CM

## 2022-03-30 DIAGNOSIS — Z98.51 TUBAL LIGATION STATUS: Chronic | ICD-10-CM

## 2022-03-30 LAB
25(OH)D3 SERPL-MCNC: 99.3 NG/ML
ALBUMIN SERPL ELPH-MCNC: 4.4 G/DL
ALP BLD-CCNC: 105 U/L
ALT SERPL-CCNC: 6 U/L
ANION GAP SERPL CALC-SCNC: 13 MMOL/L
AST SERPL-CCNC: 12 U/L
BILIRUB SERPL-MCNC: 0.4 MG/DL
BUN SERPL-MCNC: 11 MG/DL
CALCIUM SERPL-MCNC: 9.7 MG/DL
CHLORIDE SERPL-SCNC: 101 MMOL/L
CHOLEST SERPL-MCNC: 178 MG/DL
CO2 SERPL-SCNC: 27 MMOL/L
CREAT SERPL-MCNC: 0.73 MG/DL
CREAT SPEC-SCNC: 47 MG/DL
EGFR: 98 ML/MIN/1.73M2
GLUCOSE SERPL-MCNC: 147 MG/DL
HBA1C MFR BLD HPLC: 8.5
HDLC SERPL-MCNC: 67 MG/DL
LDLC SERPL CALC-MCNC: 95 MG/DL
MICROALBUMIN 24H UR DL<=1MG/L-MCNC: <1.2 MG/DL
MICROALBUMIN/CREAT 24H UR-RTO: NORMAL MG/G
NONHDLC SERPL-MCNC: 110 MG/DL
POTASSIUM SERPL-SCNC: 4.4 MMOL/L
PROT SERPL-MCNC: 6.8 G/DL
SODIUM SERPL-SCNC: 141 MMOL/L
TRIGL SERPL-MCNC: 78 MG/DL
TSH SERPL-ACNC: 1.23 UIU/ML

## 2022-03-30 PROCEDURE — 77066 DX MAMMO INCL CAD BI: CPT

## 2022-03-30 PROCEDURE — G0279: CPT

## 2022-03-30 PROCEDURE — 77062 BREAST TOMOSYNTHESIS BI: CPT | Mod: 26

## 2022-03-30 PROCEDURE — 77066 DX MAMMO INCL CAD BI: CPT | Mod: 26

## 2022-03-30 PROCEDURE — 76641 ULTRASOUND BREAST COMPLETE: CPT | Mod: 26,50

## 2022-03-30 PROCEDURE — G0279: CPT | Mod: 26

## 2022-03-30 PROCEDURE — 76641 ULTRASOUND BREAST COMPLETE: CPT

## 2022-04-01 ENCOUNTER — OUTPATIENT (OUTPATIENT)
Dept: OUTPATIENT SERVICES | Facility: HOSPITAL | Age: 57
LOS: 1 days | End: 2022-04-01
Payer: COMMERCIAL

## 2022-04-01 VITALS
DIASTOLIC BLOOD PRESSURE: 82 MMHG | HEIGHT: 64 IN | RESPIRATION RATE: 16 BRPM | WEIGHT: 197.09 LBS | SYSTOLIC BLOOD PRESSURE: 131 MMHG | TEMPERATURE: 98 F | HEART RATE: 86 BPM | OXYGEN SATURATION: 99 %

## 2022-04-01 DIAGNOSIS — Z98.51 TUBAL LIGATION STATUS: Chronic | ICD-10-CM

## 2022-04-01 DIAGNOSIS — G56.00 CARPAL TUNNEL SYNDROME, UNSPECIFIED UPPER LIMB: ICD-10-CM

## 2022-04-01 DIAGNOSIS — Z01.818 ENCOUNTER FOR OTHER PREPROCEDURAL EXAMINATION: ICD-10-CM

## 2022-04-01 DIAGNOSIS — Z98.891 HISTORY OF UTERINE SCAR FROM PREVIOUS SURGERY: Chronic | ICD-10-CM

## 2022-04-01 DIAGNOSIS — G56.03 CARPAL TUNNEL SYNDROME, BILATERAL UPPER LIMBS: ICD-10-CM

## 2022-04-01 DIAGNOSIS — Z98.890 OTHER SPECIFIED POSTPROCEDURAL STATES: Chronic | ICD-10-CM

## 2022-04-01 LAB
ANION GAP SERPL CALC-SCNC: 13 MMOL/L — SIGNIFICANT CHANGE UP (ref 5–17)
BUN SERPL-MCNC: 13 MG/DL — SIGNIFICANT CHANGE UP (ref 7–23)
CALCIUM SERPL-MCNC: 9.1 MG/DL — SIGNIFICANT CHANGE UP (ref 8.4–10.5)
CHLORIDE SERPL-SCNC: 100 MMOL/L — SIGNIFICANT CHANGE UP (ref 96–108)
CO2 SERPL-SCNC: 25 MMOL/L — SIGNIFICANT CHANGE UP (ref 22–31)
CREAT SERPL-MCNC: 0.7 MG/DL — SIGNIFICANT CHANGE UP (ref 0.5–1.3)
EGFR: 103 ML/MIN/1.73M2 — SIGNIFICANT CHANGE UP
GLUCOSE SERPL-MCNC: 278 MG/DL — HIGH (ref 70–99)
HCT VFR BLD CALC: 39.4 % — SIGNIFICANT CHANGE UP (ref 34.5–45)
HGB BLD-MCNC: 13.1 G/DL — SIGNIFICANT CHANGE UP (ref 11.5–15.5)
MCHC RBC-ENTMCNC: 28.7 PG — SIGNIFICANT CHANGE UP (ref 27–34)
MCHC RBC-ENTMCNC: 33.2 GM/DL — SIGNIFICANT CHANGE UP (ref 32–36)
MCV RBC AUTO: 86.2 FL — SIGNIFICANT CHANGE UP (ref 80–100)
NRBC # BLD: 0 /100 WBCS — SIGNIFICANT CHANGE UP (ref 0–0)
PLATELET # BLD AUTO: 206 K/UL — SIGNIFICANT CHANGE UP (ref 150–400)
POTASSIUM SERPL-MCNC: 4.3 MMOL/L — SIGNIFICANT CHANGE UP (ref 3.5–5.3)
POTASSIUM SERPL-SCNC: 4.3 MMOL/L — SIGNIFICANT CHANGE UP (ref 3.5–5.3)
RBC # BLD: 4.57 M/UL — SIGNIFICANT CHANGE UP (ref 3.8–5.2)
RBC # FLD: 13.2 % — SIGNIFICANT CHANGE UP (ref 10.3–14.5)
SODIUM SERPL-SCNC: 138 MMOL/L — SIGNIFICANT CHANGE UP (ref 135–145)
WBC # BLD: 7.43 K/UL — SIGNIFICANT CHANGE UP (ref 3.8–10.5)
WBC # FLD AUTO: 7.43 K/UL — SIGNIFICANT CHANGE UP (ref 3.8–10.5)

## 2022-04-01 PROCEDURE — G0463: CPT

## 2022-04-01 PROCEDURE — 80048 BASIC METABOLIC PNL TOTAL CA: CPT

## 2022-04-01 PROCEDURE — 83036 HEMOGLOBIN GLYCOSYLATED A1C: CPT

## 2022-04-01 PROCEDURE — 36415 COLL VENOUS BLD VENIPUNCTURE: CPT

## 2022-04-01 PROCEDURE — 85027 COMPLETE CBC AUTOMATED: CPT

## 2022-04-01 RX ORDER — FAMOTIDINE 10 MG/ML
0 INJECTION INTRAVENOUS
Qty: 0 | Refills: 0 | DISCHARGE

## 2022-04-01 RX ORDER — ATORVASTATIN CALCIUM 80 MG/1
1 TABLET, FILM COATED ORAL
Qty: 0 | Refills: 0 | DISCHARGE

## 2022-04-01 RX ORDER — ANASTROZOLE 1 MG/1
1 TABLET ORAL
Qty: 0 | Refills: 0 | DISCHARGE

## 2022-04-01 RX ORDER — SODIUM CHLORIDE 9 MG/ML
1000 INJECTION, SOLUTION INTRAVENOUS
Refills: 0 | Status: DISCONTINUED | OUTPATIENT
Start: 2022-04-22 | End: 2022-05-07

## 2022-04-01 RX ORDER — CHLORHEXIDINE GLUCONATE 213 G/1000ML
1 SOLUTION TOPICAL DAILY
Refills: 0 | Status: DISCONTINUED | OUTPATIENT
Start: 2022-04-22 | End: 2022-05-07

## 2022-04-01 NOTE — H&P PST ADULT - NSICDXFAMILYHX_GEN_ALL_CORE_FT
FAMILY HISTORY:  No pertinent family history in first degree relatives     FAMILY HISTORY:  Mother  Still living? No  FH: stroke, Age at diagnosis: Age Unknown  FH: type 1 diabetes, Age at diagnosis: Age Unknown

## 2022-04-01 NOTE — H&P PST ADULT - RESPIRATORY RATE (BREATHS/MIN)
"      SPORTS & ORTHOPEDIC WALK-IN FOLLOW-UP VISIT 10/14/2019    Interval History:     Follow up reason: Low back pain     Date of injury: 9/1/19    Date last seen: 9/12/19    Following Therapeutic Plan: Yes     Pain: Worsening    Function: Worsening    Interval History: Began steroid course.  Pain was worse on prednisone that was on ibuprofen.  Is completed steroid course.  Pain is increasing.  In similar distribution as previous.  Was seen in urgent care yesterday and given shot of Toradol as well as Norco, which does help with the pain though it makes her feel out of it.  No weakness.    Medical History:    Any recent changes to your medical history? No    Any new medication prescribed since last visit? No    Review of Systems:    Do you have fever, chills, weight loss? No    Do you have any vision problems? No    Do you have any chest pain or edema? No    Do you have any shortness of breath or wheezing?  No    Do you have stomach problems? No    Do you have any numbness or focal weakness? No    Do you have diabetes? No    Do you have problems with bleeding or clotting? No    Do you have an rashes or other skin lesions? No         EXAM:Resp 16   Ht 1.575 m (5' 2\")   Wt 59.9 kg (132 lb)   BMI 24.14 kg/m      General: Alert, pleasant, no distress  No further exam performed this visit    Imaging: No new imaging this visit      Assessment: Patient is a 73 year old female with left-sided low back pain with increasing radicular symptoms despite prednisone course.    Recommendations:   Reviewed assessment with the patient in detail  At this point we will pursue MRI.  She was given a short prescription for lorazepam for pre-test anxiety.  We will call the patient with results and presumably with scheduling instructions for epidural steroid injection.    Prem Archer MD                "
16

## 2022-04-01 NOTE — H&P PST ADULT - PROBLEM SELECTOR PLAN 1
Pt. is scheduled for B/L carpal tunnel release on 4/22/22.  Preop instructions reviewed, pt verbalized understanding.  Preop labs drawn (CBC, BMP, HGBA1C).  Preop Covid swab scheduled for 4/19/22 at [facility]. Pt. is scheduled for B/L carpal tunnel release on 4/22/22.  Preop instructions reviewed, pt verbalized understanding.  Preop labs drawn (CBC, BMP, HGBA1C).  Preop Covid swab scheduled for 4/19/22 at [facility].  OR booking notified for WON precautions as intermediate risk identified by screening tool.

## 2022-04-01 NOTE — H&P PST ADULT - FALL HARM RISK - UNIVERSAL INTERVENTIONS
Bed in lowest position, wheels locked, appropriate side rails in place/Call bell, personal items and telephone in reach/Instruct patient to call for assistance before getting out of bed or chair/Non-slip footwear when patient is out of bed/[place] to call system/Physically safe environment - no spills, clutter or unnecessary equipment/Purposeful Proactive Rounding/Room/bathroom lighting operational, light cord in reach

## 2022-04-01 NOTE — H&P PST ADULT - HISTORY OF PRESENT ILLNESS
57 y/o female with history of B/L carpal tunnel syndrome presents today for presurgical evaluation.  She is scheduled for....... 55 y/o female with history of B/L carpal tunnel syndrome presents today for presurgical evaluation.    numbness/tingling, radiates up arms, feels like they are on fire, Failed splints and steroid injection  She is scheduled for....... 55 y/o female with history of B/L carpal tunnel syndrome presents today for presurgical evaluation.  She complains of numbness and tingling to bilateral hands, which radiates up her arms and reports that sometimes she feels like her arms are "on fire".  She has tried night splints, Gabapentin and carpal tunnel injections with little improvement.  She reports that the pain is worse now and wakes her up at night.  She also complains of B/L hand weakness.  She is scheduled for bilateral carpal tunnel release on 4/22/22.    She denies any recent infections, fever, chills, chest pain, shortness of breath, cough and wheezing.    She is scheduled for preop Covid swab on 4/19/22 at [facility].

## 2022-04-01 NOTE — H&P PST ADULT - NSICDXPASTMEDICALHX_GEN_ALL_CORE_FT
PAST MEDICAL HISTORY:  H/O carpal tunnel syndrome     Hyperlipidemia     Intraductal cancer Left breast    Osteopenia     Type 2 diabetes mellitus 2013    Vitamin D deficiency      PAST MEDICAL HISTORY:  2019 novel coronavirus disease (COVID-19) Nov 2020- mild symptoms, did not require treatment    H/O carpal tunnel syndrome     Hyperlipidemia     Intraductal cancer Left breast s/p lumpectomy and adjuvant radiation    Osteopenia     Thyroid nodule     Type 2 diabetes mellitus 2013    Vitamin D deficiency

## 2022-04-01 NOTE — H&P PST ADULT - NSICDXPASTSURGICALHX_GEN_ALL_CORE_FT
PAST SURGICAL HISTORY:  H/O tubal ligation     History of  ,,     PAST SURGICAL HISTORY:  H/O lumpectomy , left breast    H/O tubal ligation     History of  ,,

## 2022-04-02 LAB
A1C WITH ESTIMATED AVERAGE GLUCOSE RESULT: 8.8 % — HIGH (ref 4–5.6)
ESTIMATED AVERAGE GLUCOSE: 206 MG/DL — HIGH (ref 68–114)

## 2022-04-07 ENCOUNTER — APPOINTMENT (OUTPATIENT)
Dept: ORTHOPEDIC SURGERY | Facility: CLINIC | Age: 57
End: 2022-04-07

## 2022-04-14 PROBLEM — D05.12 INTRADUCTAL CARCINOMA IN SITU OF LEFT BREAST: Chronic | Status: ACTIVE | Noted: 2018-04-02

## 2022-04-14 PROBLEM — Z86.69 PERSONAL HISTORY OF OTHER DISEASES OF THE NERVOUS SYSTEM AND SENSE ORGANS: Chronic | Status: ACTIVE | Noted: 2022-04-01

## 2022-04-14 PROBLEM — E04.1 NONTOXIC SINGLE THYROID NODULE: Chronic | Status: ACTIVE | Noted: 2022-04-01

## 2022-04-14 PROBLEM — M85.80 OTHER SPECIFIED DISORDERS OF BONE DENSITY AND STRUCTURE, UNSPECIFIED SITE: Chronic | Status: ACTIVE | Noted: 2022-04-01

## 2022-04-14 PROBLEM — E55.9 VITAMIN D DEFICIENCY, UNSPECIFIED: Chronic | Status: ACTIVE | Noted: 2022-04-01

## 2022-04-14 PROBLEM — U07.1 COVID-19: Chronic | Status: ACTIVE | Noted: 2022-04-01

## 2022-04-15 NOTE — ED ADULT TRIAGE NOTE - MODE OF ARRIVAL
EMS T[place]ac Pregnancy And Lactation Text: This medication is not safe during pregnancy. It is unknown if this medication is excreted in breast milk.

## 2022-04-18 ENCOUNTER — OUTPATIENT (OUTPATIENT)
Dept: OUTPATIENT SERVICES | Facility: HOSPITAL | Age: 57
LOS: 1 days | End: 2022-04-18
Payer: COMMERCIAL

## 2022-04-18 ENCOUNTER — APPOINTMENT (OUTPATIENT)
Dept: ULTRASOUND IMAGING | Facility: IMAGING CENTER | Age: 57
End: 2022-04-18
Payer: COMMERCIAL

## 2022-04-18 DIAGNOSIS — Z98.890 OTHER SPECIFIED POSTPROCEDURAL STATES: Chronic | ICD-10-CM

## 2022-04-18 DIAGNOSIS — Z98.51 TUBAL LIGATION STATUS: Chronic | ICD-10-CM

## 2022-04-18 DIAGNOSIS — Z98.891 HISTORY OF UTERINE SCAR FROM PREVIOUS SURGERY: Chronic | ICD-10-CM

## 2022-04-18 DIAGNOSIS — E04.1 NONTOXIC SINGLE THYROID NODULE: ICD-10-CM

## 2022-04-18 PROCEDURE — 76536 US EXAM OF HEAD AND NECK: CPT

## 2022-04-18 PROCEDURE — 76536 US EXAM OF HEAD AND NECK: CPT | Mod: 26

## 2022-04-19 ENCOUNTER — OUTPATIENT (OUTPATIENT)
Dept: OUTPATIENT SERVICES | Facility: HOSPITAL | Age: 57
LOS: 1 days | End: 2022-04-19
Payer: COMMERCIAL

## 2022-04-19 DIAGNOSIS — Z98.51 TUBAL LIGATION STATUS: Chronic | ICD-10-CM

## 2022-04-19 DIAGNOSIS — Z98.891 HISTORY OF UTERINE SCAR FROM PREVIOUS SURGERY: Chronic | ICD-10-CM

## 2022-04-19 DIAGNOSIS — Z11.52 ENCOUNTER FOR SCREENING FOR COVID-19: ICD-10-CM

## 2022-04-19 DIAGNOSIS — Z98.890 OTHER SPECIFIED POSTPROCEDURAL STATES: Chronic | ICD-10-CM

## 2022-04-19 LAB — SARS-COV-2 RNA SPEC QL NAA+PROBE: SIGNIFICANT CHANGE UP

## 2022-04-19 PROCEDURE — U0005: CPT

## 2022-04-19 PROCEDURE — U0003: CPT

## 2022-04-19 PROCEDURE — C9803: CPT

## 2022-04-21 ENCOUNTER — TRANSCRIPTION ENCOUNTER (OUTPATIENT)
Age: 57
End: 2022-04-21

## 2022-04-21 NOTE — ASU DISCHARGE PLAN (ADULT/PEDIATRIC) - MEDICATION INSTRUCTIONS
I have reviewed the message from [facility] and I authorize these orders.  Gucci Wu MD        any Tylenol may start after 10 PM tonight

## 2022-04-21 NOTE — ASU DISCHARGE PLAN (ADULT/PEDIATRIC) - NS MD DC FALL RISK RISK
For information on Fall & Injury Prevention, visit: [email or web address] OR  [email or web address] OR  https://www.cdc.gov/steadi/patient.html

## 2022-04-21 NOTE — ASU DISCHARGE PLAN (ADULT/PEDIATRIC) - CARE PROVIDER_API CALL
Genny Rodrigues (MD; MPH)  Orthopaedic Surgery  [address], [address]  [place] [address]  Phone: [phone]  Fax: [phone]  Follow Up Time:

## 2022-04-22 ENCOUNTER — OUTPATIENT (OUTPATIENT)
Dept: OUTPATIENT SERVICES | Facility: HOSPITAL | Age: 57
LOS: 1 days | End: 2022-04-22
Payer: COMMERCIAL

## 2022-04-22 ENCOUNTER — APPOINTMENT (OUTPATIENT)
Dept: ORTHOPEDIC SURGERY | Facility: HOSPITAL | Age: 57
End: 2022-04-22

## 2022-04-22 VITALS
DIASTOLIC BLOOD PRESSURE: 62 MMHG | OXYGEN SATURATION: 100 % | HEART RATE: 88 BPM | SYSTOLIC BLOOD PRESSURE: 116 MMHG | TEMPERATURE: 98 F

## 2022-04-22 VITALS
SYSTOLIC BLOOD PRESSURE: 123 MMHG | WEIGHT: 197.09 LBS | OXYGEN SATURATION: 98 % | HEART RATE: 76 BPM | TEMPERATURE: 98 F | HEIGHT: 64 IN | RESPIRATION RATE: 15 BRPM | DIASTOLIC BLOOD PRESSURE: 76 MMHG

## 2022-04-22 DIAGNOSIS — Z98.890 OTHER SPECIFIED POSTPROCEDURAL STATES: Chronic | ICD-10-CM

## 2022-04-22 DIAGNOSIS — Z98.891 HISTORY OF UTERINE SCAR FROM PREVIOUS SURGERY: Chronic | ICD-10-CM

## 2022-04-22 DIAGNOSIS — G56.03 CARPAL TUNNEL SYNDROME, BILATERAL UPPER LIMBS: ICD-10-CM

## 2022-04-22 DIAGNOSIS — Z98.51 TUBAL LIGATION STATUS: Chronic | ICD-10-CM

## 2022-04-22 LAB — GLUCOSE BLDC GLUCOMTR-MCNC: 133 MG/DL — HIGH (ref 70–99)

## 2022-04-22 PROCEDURE — 64721 CARPAL TUNNEL SURGERY: CPT | Mod: 50

## 2022-04-22 PROCEDURE — 82962 GLUCOSE BLOOD TEST: CPT

## 2022-04-22 RX ORDER — CETIRIZINE HYDROCHLORIDE 10 MG/1
1 TABLET ORAL
Qty: 0 | Refills: 0 | DISCHARGE

## 2022-04-22 RX ORDER — ASPIRIN/CALCIUM CARB/MAGNESIUM 324 MG
1 TABLET ORAL
Qty: 0 | Refills: 0 | DISCHARGE

## 2022-04-22 RX ORDER — LORATADINE 10 MG/1
1 TABLET ORAL
Qty: 0 | Refills: 0 | DISCHARGE

## 2022-04-22 RX ORDER — ATORVASTATIN CALCIUM 80 MG/1
1 TABLET, FILM COATED ORAL
Qty: 0 | Refills: 0 | DISCHARGE

## 2022-04-22 RX ORDER — FLUTICASONE PROPIONATE 50 MCG
1 SPRAY, SUSPENSION NASAL
Qty: 0 | Refills: 0 | DISCHARGE

## 2022-04-22 RX ORDER — CHOLECALCIFEROL (VITAMIN D3) 125 MCG
1 CAPSULE ORAL
Qty: 0 | Refills: 0 | DISCHARGE

## 2022-04-22 RX ORDER — IBUPROFEN 200 MG
800 TABLET ORAL ONCE
Refills: 0 | Status: DISCONTINUED | OUTPATIENT
Start: 2022-04-22 | End: 2022-05-07

## 2022-04-22 RX ORDER — ANASTROZOLE 1 MG/1
1 TABLET ORAL
Qty: 0 | Refills: 0 | DISCHARGE

## 2022-04-22 RX ORDER — LIDOCAINE HCL 20 MG/ML
0.2 VIAL (ML) INJECTION ONCE
Refills: 0 | Status: COMPLETED | OUTPATIENT
Start: 2022-04-22 | End: 2022-04-22

## 2022-04-22 RX ORDER — METFORMIN HYDROCHLORIDE 850 MG/1
0 TABLET ORAL
Qty: 0 | Refills: 0 | DISCHARGE

## 2022-04-22 RX ADMIN — SODIUM CHLORIDE 100 MILLILITER(S): 9 INJECTION, SOLUTION INTRAVENOUS at 14:56

## 2022-04-22 RX ADMIN — CHLORHEXIDINE GLUCONATE 1 APPLICATION(S): 213 SOLUTION TOPICAL at 14:57

## 2022-04-22 NOTE — PRE-ANESTHESIA EVALUATION ADULT - NSANTHASARD_GEN_ALL_CORE
Subjective:       Patient ID: Pratik Laird is a 70 y.o. male.    Chief Complaint: Follow-up (With scope)    Follow-up    The patient is returning for follow-up visit.  There are multiple issues to discuss:  1. Laryngopharyngeal reflux:  In general, the patient has occasional phlegm in his throat.  The throat clearing globus sensation have cleared.  He has been using pantoprazole twice daily.   2. Allergic rhinitis:  He has noticed nasal congestion and mildly purulent nasal secretions over the last several days.  I it is causing him to have some pressure in his ears.  He does not have fever.  He takes loratadine on a daily basis.    3. Sensorineural hearing loss/hearing aids worn:  He does have hearing aids which he wears routinely.  There has been no significant change in his hearing.      Review of Systems     Constitutional: Positive for fatigue.  Negative for appetite change and unexpected weight loss.      HENT: Positive for ear pain, postnasal drip, runny nose, sinus pressure, stuffy nose and trouble swallowing.  Negative for ear discharge, ear infection, facial swelling, hearing loss, mouth sores, nosebleeds, ringing in the ears, sinus infection, tonsil infection, dental problems and voice change.      Eyes:  Negative for change in eyesight, eye drainage, eye itching and photophobia.     Respiratory:  Negative for shortness of breath, sleep apnea, snoring and wheezing.      Cardiovascular:  Negative for foot swelling, irregular heartbeat and swollen veins.     Gastrointestinal:  Negative for acid reflux, constipation, diarrhea and heartburn.     Genitourinary: Negative for difficulty urinating and frequent urination.     Musc: Negative for aching joints, aching muscles and back pain.     Allergy: Negative for food allergies and seasonal allergies.     Endocrine: Negative for cold intolerance and heat intolerance.      Neurological: Negative for dizziness, light-headedness, seizures and tremors.       Hematologic: Negative for bruises/bleeds easily.      Psychiatric: Negative for decreased concentration, depression, nervous/anxious and sleep disturbance.                Objective:      Physical Exam  Vitals and nursing note reviewed.   Constitutional:       General: He is awake.      Appearance: Normal appearance. He is well-developed, well-groomed and overweight.   HENT:      Head: Normocephalic.      Jaw: There is normal jaw occlusion.      Salivary Glands: Right salivary gland is not diffusely enlarged. Left salivary gland is not diffusely enlarged.      Right Ear: Ear canal and external ear normal. Decreased hearing (Wears hearing aid) noted. There is minimal cerumen. Tympanic membrane is retracted. Tympanic membrane has decreased mobility.      Left Ear: Ear canal and external ear normal. Decreased hearing ( wears hearing aid) noted. There is miniml cerumen. Tympanic membrane is retracted. Tympanic membrane has decreased mobility.      Nose: Septal deviation (To the left), mucosal edema (cyanotic, boggy inferior turbinates bilaterally) and rhinorrhea (clear mucus bilaterally) present. No nasal deformity. Rhinorrhea is clear.      Right Turbinates: Enlarged and pale.      Left Turbinates: Enlarged and pale.      Mouth/Throat: Tonsils 3+/4+     Lips: No lesions.      Mouth: Mucous membranes are moist. No oral lesions.      Dentition: No gum lesions.      Tongue: No lesions.      Palate: No mass and lesions.      Pharynx: Oropharynx is clear. Uvula midline.   Eyes:      General: Lids are normal.         Right eye: No discharge.         Left eye: No discharge.      Conjunctiva/sclera:      Right eye: Right conjunctiva is injected. No exudate.     Left eye: Left conjunctiva is injected. No exudate.     Pupils: Pupils are equal, round, and reactive to light.   Neck:      Thyroid: No thyroid mass or thyromegaly.      Trachea: Trachea normal. No tracheal deviation.   Cardiovascular:      Rate and Rhythm: Normal  rate and regular rhythm.      Pulses: Normal pulses.      Heart sounds: Normal heart sounds.   Pulmonary:      Effort: Pulmonary effort is normal.      Breath sounds: Normal breath sounds. No stridor. No decreased breath sounds, wheezing, rhonchi or rales.   Abdominal:      General: Bowel sounds are normal.      Palpations: Abdomen is soft.      Tenderness: There is no abdominal tenderness.   Musculoskeletal:         General: Normal range of motion.      Cervical back: Normal range of motion. No muscular tenderness.   Lymphadenopathy:      Head:      Right side of head: No submental, submandibular, preauricular, posterior auricular or occipital adenopathy.      Left side of head: No submental, submandibular, preauricular, posterior auricular or occipital adenopathy.      Cervical: No cervical adenopathy.   Skin:     General: Skin is warm and dry.      Findings: No petechiae or rash.      Nails: There is no clubbing.   Neurological:      Mental Status: He is alert and oriented to person, place, and time.      Cranial Nerves: No cranial nerve deficit.      Sensory: No sensory deficit.      Gait: Gait normal.   Psychiatric:         Speech: Speech normal.         Behavior: Behavior normal. Behavior is cooperative.         Thought Content: Thought content normal.         Judgment: Judgment normal.         Flexible video nasolaryngoscopy-nasal septal deviation and nasal changes allergic rhinitis; laryngeal changes consistent with laryngopharyngeal reflux that has improved significantly on b.i.d. PPI therapy    Assessment:       1. Laryngopharyngeal reflux (LPR)    2. Phlegm in throat    3. Allergic rhinitis, unspecified seasonality, unspecified trigger    4. PND (post-nasal drip)    5. Acute non-recurrent sinusitis, unspecified location    6. Bilateral hearing loss, unspecified hearing loss type    7. Hearing aid worn    8. Nasal septal deviation        Plan:       I   will switch the patient from pantoprazole to  famotidine.  He does have a mild sinus infection for which I am prescribing high-dose azithromycin.  He will continue with his other medical therapies.  I will recheck him in 6 weeks.     2

## 2022-04-22 NOTE — ASU PATIENT PROFILE, ADULT - NSICDXPASTMEDICALHX_GEN_ALL_CORE_FT
PAST MEDICAL HISTORY:  2019 novel coronavirus disease (COVID-19) Nov 2020- mild symptoms, did not require treatment    H/O carpal tunnel syndrome     Hyperlipidemia     Intraductal cancer Left breast s/p lumpectomy and adjuvant radiation    Osteopenia     Thyroid nodule     Type 2 diabetes mellitus 2013    Vitamin D deficiency

## 2022-04-22 NOTE — ASU PREOP CHECKLIST - SURGICAL CONSENT
[facility]  Pulmonology  Progress Note    Subjective:   No major issues overnight.  No respiratory complaints this morning.    Objective:     Vital Signs:  Temp:  [98.1 °F (36.7 °C)-98.9 °F (37.2 °C)] 98.9 °F (37.2 °C)  Pulse:  [] 102  Resp:  [10-33] 19  SpO2:  [86 %-100 %] 99 %  BP: ()/(52-88) 118/72     Weight: 73.3 kg (161 lb 9.6 oz)  Body mass index is 25.31 kg/m².     Gross per 24 hour   Intake              335 ml   Output                0 ml   Net              335 ml     Physical Exam  Gen: no distress, resting comfortably on room air   HEENT: lips, mucosa, and tongue normal; teeth and gums normal and no throat erythema  conjunctivae/corneas clear. PERRL.   CVS: regular rate and rhythm, S1, S2 normal, no murmur   Chest: clear to auscultation bilaterally, normal respiratory effort and normal percussion bilaterally   Abdomen: soft, non-tender non-distended; bowel sounds normal   Ext: warm, well perfused and no cyanosis or edema, or clubbing   Skin: no rashes, no ecchymoses, no petechiae   Neuro: oriented, normal mood, grossly non-focal       Vents:  Oxygen Concentration (%): 30 (06/01/18 0609)    Significant Labs:  WBC 4.39 5.28   HGB 8.3* 8.4*   HCT 27.4* 28.1*    230   * 101*   RDW 20.4* 19.7*      138   K 3.8 4.4    105   CO2 25 19*   BUN 9 15   CREATININE 1.5* 2.9*   CALCIUM 8.4* 8.7   ALBUMIN 2.1* 2.2*   PROT 5.4* 5.9*   BILITOT 0.5 0.4   ALKPHOS 125 119   ALT 10 11   AST 15 32   MG 1.9 2.2   All pertinent labs within the past 24 hours have been reviewed.    Significant Imaging:  No new images this morning    Assessment/Plan:     Pulmonary infiltrates on CXR    · Etiology remains elusive.  · Studies from bronchoscopy remain unrevealing up to this point.  · ID following.      Pleural effusion    · Stable/chronic.  · Monitor.      Pulmonology will sign off at this time; please re-consult as needed.    Gautam Bateman MD  Pulmonology  [facility]  [place]     done

## 2022-05-04 ENCOUNTER — APPOINTMENT (OUTPATIENT)
Dept: ORTHOPEDIC SURGERY | Facility: CLINIC | Age: 57
End: 2022-05-04
Payer: COMMERCIAL

## 2022-05-04 PROCEDURE — 99024 POSTOP FOLLOW-UP VISIT: CPT

## 2022-05-17 ENCOUNTER — NON-APPOINTMENT (OUTPATIENT)
Age: 57
End: 2022-05-17

## 2022-05-27 ENCOUNTER — NON-APPOINTMENT (OUTPATIENT)
Age: 57
End: 2022-05-27

## 2022-06-02 ENCOUNTER — APPOINTMENT (OUTPATIENT)
Dept: ORTHOPEDIC SURGERY | Facility: CLINIC | Age: 57
End: 2022-06-02
Payer: COMMERCIAL

## 2022-06-02 DIAGNOSIS — G56.03 CARPAL TUNNEL SYNDROM,BILATERAL UPPER LIMBS: ICD-10-CM

## 2022-06-02 PROCEDURE — 99024 POSTOP FOLLOW-UP VISIT: CPT

## 2022-10-27 ENCOUNTER — RX RENEWAL (OUTPATIENT)
Age: 57
End: 2022-10-27

## 2022-10-27 ENCOUNTER — APPOINTMENT (OUTPATIENT)
Dept: ORTHOPEDIC SURGERY | Facility: CLINIC | Age: 57
End: 2022-10-27

## 2022-10-27 DIAGNOSIS — M75.52 BURSITIS OF LEFT SHOULDER: ICD-10-CM

## 2022-10-27 PROCEDURE — 20550 NJX 1 TENDON SHEATH/LIGAMENT: CPT | Mod: FA

## 2022-10-27 PROCEDURE — 99214 OFFICE O/P EST MOD 30 MIN: CPT | Mod: 25

## 2022-10-27 RX ORDER — MELOXICAM 15 MG/1
15 TABLET ORAL
Qty: 90 | Refills: 3 | Status: ACTIVE | COMMUNITY
Start: 2022-10-27 | End: 1900-01-01

## 2022-12-28 ENCOUNTER — APPOINTMENT (OUTPATIENT)
Dept: SURGERY | Facility: CLINIC | Age: 57
End: 2022-12-28

## 2023-02-23 ENCOUNTER — APPOINTMENT (OUTPATIENT)
Dept: PULMONOLOGY | Facility: CLINIC | Age: 58
End: 2023-02-23
Payer: COMMERCIAL

## 2023-02-23 ENCOUNTER — APPOINTMENT (OUTPATIENT)
Dept: NEUROLOGY | Facility: CLINIC | Age: 58
End: 2023-02-23
Payer: COMMERCIAL

## 2023-02-23 VITALS
HEIGHT: 64 IN | RESPIRATION RATE: 16 BRPM | BODY MASS INDEX: 35.68 KG/M2 | OXYGEN SATURATION: 97 % | HEART RATE: 91 BPM | SYSTOLIC BLOOD PRESSURE: 143 MMHG | TEMPERATURE: 97.5 F | WEIGHT: 209 LBS | DIASTOLIC BLOOD PRESSURE: 79 MMHG

## 2023-02-23 VITALS
WEIGHT: 207 LBS | BODY MASS INDEX: 35.34 KG/M2 | DIASTOLIC BLOOD PRESSURE: 90 MMHG | HEIGHT: 64 IN | HEART RATE: 78 BPM | SYSTOLIC BLOOD PRESSURE: 156 MMHG

## 2023-02-23 DIAGNOSIS — G47.33 OBSTRUCTIVE SLEEP APNEA (ADULT) (PEDIATRIC): ICD-10-CM

## 2023-02-23 PROCEDURE — 99204 OFFICE O/P NEW MOD 45 MIN: CPT | Mod: GC

## 2023-02-23 PROCEDURE — 99213 OFFICE O/P EST LOW 20 MIN: CPT

## 2023-02-23 NOTE — ASSESSMENT
[FreeTextEntry1] : 57 year-old F with PMH DM2, obesity, HLD, and breast ca who presented to the clinic due to concern for obstructive sleep apnea. She endorses snoring, witnessed apneas, daytime sleepiness, and AM dry mouth.\par \par Concern for WON\par - Patient with symptoms consistent with possible obstructive sleep apnea\par - HST ordered for further evaluation\par - Possible therapeutic options explained to patient in detail, as well as benefits of treatment should WON be found\par - Follow up after HST\par The ramifications of obstructive sleep apnea and its potential therapeutic modalities were discussed with the patient. \par Discussed with Dr. Doshi

## 2023-02-23 NOTE — PHYSICAL EXAM
[General Appearance - Well Developed] : well developed [Well Groomed] : well groomed [General Appearance - Well Nourished] : well nourished [General Appearance - In No Acute Distress] : no acute distress [Normal Conjunctiva] : the conjunctiva exhibited no abnormalities [Low Lying Soft Palate] : low lying soft palate [II] : II [Neck Appearance] : the appearance of the neck was normal [Heart Rate And Rhythm] : heart rate was normal and rhythm regular [Heart Sounds] : normal S1 and S2 [Murmurs] : no murmurs [Respiration, Rhythm And Depth] : normal respiratory rhythm and effort [Auscultation Breath Sounds / Voice Sounds] : lungs were clear to auscultation bilaterally [Abnormal Walk] : normal gait [Nail Clubbing] : no clubbing  or cyanosis of the fingernails [Musculoskeletal - Swelling] : no joint swelling seen [Motor Tone] : muscle strength and tone were normal [Skin Turgor] : normal skin turgor [] : no rash [Skin Lesions] : no skin lesions [Sensation] : the sensory exam was normal to light touch and pinprick [Motor Exam] : the motor exam was normal [Oriented To Time, Place, And Person] : oriented to person, place, and time [Impaired Insight] : insight and judgment were intact [Affect] : the affect was normal [Mood] : the mood was normal

## 2023-02-23 NOTE — HISTORY OF PRESENT ILLNESS
[Snoring] : snoring [Witnessed Apneas] : witnessed sleep apnea [Frequent Nocturnal Awakening] : frequent nocturnal awakening [Unintentional Sleep While Inactive] : unintentional sleep while inactive [Awakes Unrefreshed] : awakening unrefreshed [Awakes with Headache] : headache upon awakening [Awakening With Dry Mouth] : awakening with dry mouth [Recent  Weight Gain] : recent weight gain [Daytime Somnolence] : daytime somnolence [To Bed: ___] : ~he/she~ goes to bed at [unfilled] [Arises: ___] : arises at [unfilled] [Sleep Onset Latency: ___ minutes] : sleep onset latency of [unfilled] minutes reported [Nocturnal Awakenings: ___] : ~he/she~ typically has [unfilled] nocturnal awakenings [FreeTextEntry1] : 57 year-old F with PMH beast ca (surgery in 2019, on anastrazole), carpal tunnel syndrome, HLD, DM2,  who presented to the clinic for initial evaluation. She reports that she has had snoring and wakes up feeling tired. She wakes up multiple times per night and does not feel refreshed in the morning. She wakes up with a dry mouth and occasionally has a headache.\par \par She denies tobacco, alcohol, and illicit substances. She works as a . She drinks 1-2 cups in the morning. She does eat chocolate. She denies history of sleep apnea. \par \par ESS 14 [Unintentional Sleep while Active] : no unintentional sleep while active [DIS] : no DIS [DMS] : no DMS [Unusual Sleep Behavior] : no unusual sleep behavior [Hypersomnolence] : no hypersomnolence [Cataplexy] : no cataplexy [Sleep Paralysis] : no sleep paralysis [Hypnagogic Hallucinations] : no hallucinations when falling asleep [Hypnopompic Hallucinations] : no hallucinations when awakening [ESS] : 14

## 2023-02-23 NOTE — REVIEW OF SYSTEMS
[EDS: ESS=____] : daytime somnolence: ESS=[unfilled] [Fatigue] : fatigue [Snoring] : snoring [Witnessed Apneas] : witnessed apnea [A.M. Dry Mouth] : a.m. dry mouth [Obesity] : obesity [Diabetes] : diabetes  [A.M. Headache] : headache present upon awakening [Arthralgias] : arthralgias [Difficulty Maintaining Sleep] : difficulty maintaining sleep [Negative] : Psychiatric [Leg Dysesthesias] : no leg dysesthesias [Difficulty Initiating Sleep] : no difficulty falling asleep [Lower Extremity Discomfort] : no lower extremity discomfort [Late day/ Evening symptoms] : no late day/evening symptoms [Unusual Sleep Behavior] : no unusual sleep behavior [Hypersomnolence] : not sleeping much more than usual [Cataplexy] :  no cataplexy [Sleep Paralysis] : no sleep paralysis

## 2023-02-23 NOTE — ASSESSMENT
[FreeTextEntry1] : Many of her symptoms may be explained by obstructive sleep apnea.  She will be referred to a sleep specialist.  She is obese and I encouraged weight loss with appropriate diet and exercise.  Her diabetes needs to be better controlled.

## 2023-02-23 NOTE — REVIEW OF SYSTEMS
[As Noted in HPI] : as noted in HPI [Recent Weight Gain (___ Lbs)] : recent [unfilled] ~Ulb weight gain [Negative] : Heme/Lymph [FreeTextEntry2] : Gained 11 pounds in the past year

## 2023-02-23 NOTE — HISTORY OF PRESENT ILLNESS
[FreeTextEntry1] : 57-year-old woman seen 1 year ago with bilateral carpal tunnel syndrome and underwent successful carpal tunnel release.  She has a history of poorly controlled diabetes mellitus.  She returns today with new complaints of a "pulsating sensation for years" in her head.  Denies headache.  She does have a history of a left vertebral artery stenosis and was evaluated by vascular neurosurgeon, Dr. Guy Jeronimo 5 years ago.\par \par 6 months ago she began to have pain in her left shoulder.  Denied any injuries.  Saw an orthopedist who obtained x-ray of the shoulder and referring her for MRI.\par \par She complains of poor memory and wakes up feeling tired.  She snores.

## 2023-02-23 NOTE — PHYSICAL EXAM
[FreeTextEntry1] : No cognitive or communication deficits.  Visual fields are full to confrontation.  No papilledema.  Pupils equal and constrict to light.  Extraocular movements intact.  No nystagmus.  Smile symmetric.  Hearing intact to finger rub.  Palate rises symmetrically and tongue protrudes in midline.  Neck is supple.  No bruits heard.  Heart sounds are normal.  No murmurs.  No focal weakness.  Tendon reflexes are absent.  Gait and coordination intact.  No focal sensory loss.

## 2023-03-22 ENCOUNTER — OUTPATIENT (OUTPATIENT)
Dept: OUTPATIENT SERVICES | Facility: HOSPITAL | Age: 58
LOS: 1 days | End: 2023-03-22
Payer: COMMERCIAL

## 2023-03-22 ENCOUNTER — APPOINTMENT (OUTPATIENT)
Dept: MRI IMAGING | Facility: IMAGING CENTER | Age: 58
End: 2023-03-22
Payer: COMMERCIAL

## 2023-03-22 DIAGNOSIS — Z98.890 OTHER SPECIFIED POSTPROCEDURAL STATES: Chronic | ICD-10-CM

## 2023-03-22 DIAGNOSIS — Z98.51 TUBAL LIGATION STATUS: Chronic | ICD-10-CM

## 2023-03-22 DIAGNOSIS — Z85.3 PERSONAL HISTORY OF MALIGNANT NEOPLASM OF BREAST: ICD-10-CM

## 2023-03-22 DIAGNOSIS — Z98.891 HISTORY OF UTERINE SCAR FROM PREVIOUS SURGERY: Chronic | ICD-10-CM

## 2023-03-22 DIAGNOSIS — M19.119 POST-TRAUMATIC OSTEOARTHRITIS, UNSPECIFIED SHOULDER: ICD-10-CM

## 2023-03-22 PROCEDURE — 73221 MRI JOINT UPR EXTREM W/O DYE: CPT | Mod: 26,LT

## 2023-03-22 PROCEDURE — 73221 MRI JOINT UPR EXTREM W/O DYE: CPT

## 2023-03-29 ENCOUNTER — APPOINTMENT (OUTPATIENT)
Dept: ULTRASOUND IMAGING | Facility: IMAGING CENTER | Age: 58
End: 2023-03-29
Payer: COMMERCIAL

## 2023-03-29 ENCOUNTER — OUTPATIENT (OUTPATIENT)
Dept: OUTPATIENT SERVICES | Facility: HOSPITAL | Age: 58
LOS: 1 days | End: 2023-03-29
Payer: COMMERCIAL

## 2023-03-29 ENCOUNTER — APPOINTMENT (OUTPATIENT)
Dept: ULTRASOUND IMAGING | Facility: CLINIC | Age: 58
End: 2023-03-29
Payer: COMMERCIAL

## 2023-03-29 ENCOUNTER — APPOINTMENT (OUTPATIENT)
Dept: MAMMOGRAPHY | Facility: IMAGING CENTER | Age: 58
End: 2023-03-29
Payer: COMMERCIAL

## 2023-03-29 ENCOUNTER — APPOINTMENT (OUTPATIENT)
Dept: SLEEP CENTER | Facility: CLINIC | Age: 58
End: 2023-03-29
Payer: COMMERCIAL

## 2023-03-29 DIAGNOSIS — Z98.51 TUBAL LIGATION STATUS: Chronic | ICD-10-CM

## 2023-03-29 DIAGNOSIS — Z00.8 ENCOUNTER FOR OTHER GENERAL EXAMINATION: ICD-10-CM

## 2023-03-29 DIAGNOSIS — Z98.890 OTHER SPECIFIED POSTPROCEDURAL STATES: Chronic | ICD-10-CM

## 2023-03-29 DIAGNOSIS — Z98.891 HISTORY OF UTERINE SCAR FROM PREVIOUS SURGERY: Chronic | ICD-10-CM

## 2023-03-29 PROCEDURE — 76641 ULTRASOUND BREAST COMPLETE: CPT | Mod: 26,50

## 2023-03-29 PROCEDURE — 95800 SLP STDY UNATTENDED: CPT | Mod: 26

## 2023-03-29 PROCEDURE — G0279: CPT

## 2023-03-29 PROCEDURE — 76830 TRANSVAGINAL US NON-OB: CPT

## 2023-03-29 PROCEDURE — 76856 US EXAM PELVIC COMPLETE: CPT

## 2023-03-29 PROCEDURE — 76856 US EXAM PELVIC COMPLETE: CPT | Mod: 26,59

## 2023-03-29 PROCEDURE — 77066 DX MAMMO INCL CAD BI: CPT | Mod: 26

## 2023-03-29 PROCEDURE — G0279: CPT | Mod: 26

## 2023-03-29 PROCEDURE — 76641 ULTRASOUND BREAST COMPLETE: CPT

## 2023-03-29 PROCEDURE — 95800 SLP STDY UNATTENDED: CPT

## 2023-03-29 PROCEDURE — 77066 DX MAMMO INCL CAD BI: CPT

## 2023-03-29 PROCEDURE — 76830 TRANSVAGINAL US NON-OB: CPT | Mod: 26

## 2023-04-05 ENCOUNTER — NON-APPOINTMENT (OUTPATIENT)
Age: 58
End: 2023-04-05

## 2023-04-13 DIAGNOSIS — G47.33 OBSTRUCTIVE SLEEP APNEA (ADULT) (PEDIATRIC): ICD-10-CM

## 2023-04-17 NOTE — ED ADULT NURSE NOTE - NSFALLRSKPSTHSTOCCUR_ED_ALL_ED
Single Mechanical/Accidental Fall/Physiological Fall Attending Attestation (For Attendings USE Only)...

## 2023-06-15 ENCOUNTER — APPOINTMENT (OUTPATIENT)
Dept: ORTHOPEDIC SURGERY | Facility: CLINIC | Age: 58
End: 2023-06-15

## 2023-06-22 ENCOUNTER — APPOINTMENT (OUTPATIENT)
Dept: ORTHOPEDIC SURGERY | Facility: CLINIC | Age: 58
End: 2023-06-22

## 2023-07-10 ENCOUNTER — APPOINTMENT (OUTPATIENT)
Dept: ORTHOPEDIC SURGERY | Facility: CLINIC | Age: 58
End: 2023-07-10
Payer: COMMERCIAL

## 2023-07-10 VITALS — WEIGHT: 210 LBS | BODY MASS INDEX: 35.85 KG/M2 | HEIGHT: 64 IN

## 2023-07-10 DIAGNOSIS — M65.312 TRIGGER THUMB, LEFT THUMB: ICD-10-CM

## 2023-07-10 DIAGNOSIS — M65.311 TRIGGER THUMB, RIGHT THUMB: ICD-10-CM

## 2023-07-10 PROCEDURE — 20550 NJX 1 TENDON SHEATH/LIGAMENT: CPT | Mod: F5,FA

## 2023-07-10 PROCEDURE — 99214 OFFICE O/P EST MOD 30 MIN: CPT | Mod: 25

## 2024-04-01 ENCOUNTER — APPOINTMENT (OUTPATIENT)
Dept: ULTRASOUND IMAGING | Facility: IMAGING CENTER | Age: 59
End: 2024-04-01
Payer: COMMERCIAL

## 2024-04-01 ENCOUNTER — APPOINTMENT (OUTPATIENT)
Dept: MAMMOGRAPHY | Facility: IMAGING CENTER | Age: 59
End: 2024-04-01
Payer: COMMERCIAL

## 2024-04-01 ENCOUNTER — OUTPATIENT (OUTPATIENT)
Dept: OUTPATIENT SERVICES | Facility: HOSPITAL | Age: 59
LOS: 1 days | End: 2024-04-01
Payer: COMMERCIAL

## 2024-04-01 DIAGNOSIS — Z98.51 TUBAL LIGATION STATUS: Chronic | ICD-10-CM

## 2024-04-01 DIAGNOSIS — Z98.891 HISTORY OF UTERINE SCAR FROM PREVIOUS SURGERY: Chronic | ICD-10-CM

## 2024-04-01 DIAGNOSIS — Z98.890 OTHER SPECIFIED POSTPROCEDURAL STATES: Chronic | ICD-10-CM

## 2024-04-01 DIAGNOSIS — Z00.8 ENCOUNTER FOR OTHER GENERAL EXAMINATION: ICD-10-CM

## 2024-04-01 PROCEDURE — 76641 ULTRASOUND BREAST COMPLETE: CPT | Mod: 26,50

## 2024-04-01 PROCEDURE — 76641 ULTRASOUND BREAST COMPLETE: CPT

## 2024-04-01 PROCEDURE — 77067 SCR MAMMO BI INCL CAD: CPT

## 2024-04-01 PROCEDURE — 77063 BREAST TOMOSYNTHESIS BI: CPT

## 2024-04-01 PROCEDURE — 77067 SCR MAMMO BI INCL CAD: CPT | Mod: 26

## 2024-04-01 PROCEDURE — 77063 BREAST TOMOSYNTHESIS BI: CPT | Mod: 26

## 2024-06-27 ENCOUNTER — OUTPATIENT (OUTPATIENT)
Dept: OUTPATIENT SERVICES | Facility: HOSPITAL | Age: 59
LOS: 1 days | End: 2024-06-27
Payer: COMMERCIAL

## 2024-06-27 ENCOUNTER — APPOINTMENT (OUTPATIENT)
Dept: ULTRASOUND IMAGING | Facility: IMAGING CENTER | Age: 59
End: 2024-06-27
Payer: COMMERCIAL

## 2024-06-27 ENCOUNTER — APPOINTMENT (OUTPATIENT)
Dept: CT IMAGING | Facility: IMAGING CENTER | Age: 59
End: 2024-06-27
Payer: COMMERCIAL

## 2024-06-27 ENCOUNTER — APPOINTMENT (OUTPATIENT)
Dept: RADIOLOGY | Facility: IMAGING CENTER | Age: 59
End: 2024-06-27
Payer: COMMERCIAL

## 2024-06-27 DIAGNOSIS — Z98.891 HISTORY OF UTERINE SCAR FROM PREVIOUS SURGERY: Chronic | ICD-10-CM

## 2024-06-27 DIAGNOSIS — Z00.8 ENCOUNTER FOR OTHER GENERAL EXAMINATION: ICD-10-CM

## 2024-06-27 DIAGNOSIS — Z98.890 OTHER SPECIFIED POSTPROCEDURAL STATES: Chronic | ICD-10-CM

## 2024-06-27 DIAGNOSIS — Z98.51 TUBAL LIGATION STATUS: Chronic | ICD-10-CM

## 2024-06-27 PROCEDURE — 76856 US EXAM PELVIC COMPLETE: CPT | Mod: 26,59

## 2024-06-27 PROCEDURE — 76830 TRANSVAGINAL US NON-OB: CPT | Mod: 26

## 2024-06-27 PROCEDURE — 74177 CT ABD & PELVIS W/CONTRAST: CPT | Mod: 26

## 2024-06-27 PROCEDURE — 74177 CT ABD & PELVIS W/CONTRAST: CPT

## 2024-06-27 PROCEDURE — 76830 TRANSVAGINAL US NON-OB: CPT

## 2024-06-27 PROCEDURE — 77080 DXA BONE DENSITY AXIAL: CPT | Mod: 26

## 2024-06-27 PROCEDURE — 76856 US EXAM PELVIC COMPLETE: CPT

## 2024-06-27 PROCEDURE — 77080 DXA BONE DENSITY AXIAL: CPT

## 2024-10-08 ENCOUNTER — APPOINTMENT (OUTPATIENT)
Dept: ENDOCRINOLOGY | Facility: CLINIC | Age: 59
End: 2024-10-08

## 2025-03-14 NOTE — ASU PATIENT PROFILE, ADULT - VISION (WITH CORRECTIVE LENSES IF THE PATIENT USUALLY WEARS THEM):
Partially impaired: cannot see medication labels or newsprint, but can see obstacles in path, and the surrounding layout; can count fingers at arm's length [id number]/Partially impaired: cannot see medication labels or newsprint, but can see obstacles in path, and the surrounding layout; can count fingers at arm's length

## 2025-03-14 NOTE — ASU PATIENT PROFILE, ADULT - FALL HARM RISK - UNIVERSAL INTERVENTIONS
Bed in lowest position, wheels locked, appropriate side rails in place/Call bell, personal items and telephone in reach/Instruct patient to call for assistance before getting out of bed or chair/Non-slip footwear when patient is out of bed/[place] to call system/Physically safe environment - no spills, clutter or unnecessary equipment/Purposeful Proactive Rounding/Room/bathroom lighting operational, light cord in reach

## 2025-03-14 NOTE — ASU PATIENT PROFILE, ADULT - NSICDXPASTSURGICALHX_GEN_ALL_CORE_FT
PAST SURGICAL HISTORY:  H/O lumpectomy , left breast    H/O tubal ligation 2013    History of  ,,    History of carpal tunnel release Bilateral

## 2025-03-14 NOTE — ASU PATIENT PROFILE, ADULT - NSICDXPASTMEDICALHX_GEN_ALL_CORE_FT
PAST MEDICAL HISTORY:  2019 novel coronavirus disease (COVID-19) Nov 2020- mild symptoms, did not require treatment    H/O carpal tunnel syndrome     H/O thyroid nodule     Hyperlipidemia     Intraductal cancer Left breast s/p lumpectomy and adjuvant radiation    Osteopenia     Thyroid nodule     Type 2 diabetes mellitus 2013    Vitamin D deficiency

## 2025-03-17 ENCOUNTER — TRANSCRIPTION ENCOUNTER (OUTPATIENT)
Age: 60
End: 2025-03-17

## 2025-03-17 ENCOUNTER — OUTPATIENT (OUTPATIENT)
Dept: OUTPATIENT SERVICES | Facility: HOSPITAL | Age: 60
LOS: 1 days | Discharge: HOME | End: 2025-03-17
Payer: COMMERCIAL

## 2025-03-17 VITALS
RESPIRATION RATE: 15 BRPM | DIASTOLIC BLOOD PRESSURE: 64 MMHG | OXYGEN SATURATION: 98 % | WEIGHT: 179.9 LBS | SYSTOLIC BLOOD PRESSURE: 116 MMHG | HEART RATE: 64 BPM | HEIGHT: 64 IN | TEMPERATURE: 98 F

## 2025-03-17 VITALS
RESPIRATION RATE: 16 BRPM | SYSTOLIC BLOOD PRESSURE: 138 MMHG | TEMPERATURE: 98 F | HEART RATE: 83 BPM | OXYGEN SATURATION: 98 % | DIASTOLIC BLOOD PRESSURE: 72 MMHG

## 2025-03-17 DIAGNOSIS — Z98.890 OTHER SPECIFIED POSTPROCEDURAL STATES: Chronic | ICD-10-CM

## 2025-03-17 DIAGNOSIS — Z98.51 TUBAL LIGATION STATUS: Chronic | ICD-10-CM

## 2025-03-17 DIAGNOSIS — Z98.891 HISTORY OF UTERINE SCAR FROM PREVIOUS SURGERY: Chronic | ICD-10-CM

## 2025-03-17 PROCEDURE — 88305 TISSUE EXAM BY PATHOLOGIST: CPT | Mod: 26

## 2025-03-17 RX ORDER — SODIUM CHLORIDE 9 G/1000ML
1000 INJECTION, SOLUTION INTRAVENOUS
Refills: 0 | Status: DISCONTINUED | OUTPATIENT
Start: 2025-03-17 | End: 2025-03-17

## 2025-03-17 RX ORDER — ACETAMINOPHEN 500 MG/5ML
1000 LIQUID (ML) ORAL ONCE
Refills: 0 | Status: COMPLETED | OUTPATIENT
Start: 2025-03-17 | End: 2025-03-17

## 2025-03-17 RX ORDER — BUPIVACAINE 13.3 MG/ML
20 INJECTION, SUSPENSION, LIPOSOMAL INFILTRATION ONCE
Refills: 0 | Status: DISCONTINUED | OUTPATIENT
Start: 2025-03-17 | End: 2025-03-17

## 2025-03-17 RX ORDER — APREPITANT 40 MG/1
40 CAPSULE ORAL ONCE
Refills: 0 | Status: COMPLETED | OUTPATIENT
Start: 2025-03-17 | End: 2025-03-17

## 2025-03-17 RX ORDER — TIRZEPATIDE 7.5 MG/.5ML
7.5 INJECTION, SOLUTION SUBCUTANEOUS
Refills: 0 | DISCHARGE

## 2025-03-17 RX ORDER — FENTANYL CITRATE-0.9 % NACL/PF 100MCG/2ML
25 SYRINGE (ML) INTRAVENOUS
Refills: 0 | Status: DISCONTINUED | OUTPATIENT
Start: 2025-03-17 | End: 2025-03-17

## 2025-03-17 RX ADMIN — APREPITANT 40 MILLIGRAM(S): 40 CAPSULE ORAL at 06:14

## 2025-03-17 RX ADMIN — Medication 25 MICROGRAM(S): at 12:30

## 2025-03-17 RX ADMIN — Medication 25 MICROGRAM(S): at 12:25

## 2025-03-17 RX ADMIN — Medication 1000 MILLIGRAM(S): at 06:14

## 2025-03-17 RX ADMIN — Medication 25 MICROGRAM(S): at 13:33

## 2025-03-17 RX ADMIN — Medication 1000 MILLIGRAM(S): at 15:30

## 2025-03-17 RX ADMIN — Medication 25 MICROGRAM(S): at 13:38

## 2025-03-17 RX ADMIN — Medication 400 MILLIGRAM(S): at 15:05

## 2025-03-17 NOTE — ASU DISCHARGE PLAN (ADULT/PEDIATRIC) - BATHING
Sponge only
67 y.o F with a PMHx of aortic dissection, marfan syndrome and HTN presents to the ED with c/o LLQ abd pain that has been persistent for a few days. Pt was referred from her internist for a possible diverticulitis. Pt denies cp and COVID contact. Pt has had a hx of a polyp and colonoscopy in 2016.. Pt denies nausea and vomiting. Pt does note some diarrhea. pt mentions a lack of appetite.

## 2025-03-17 NOTE — ASU DISCHARGE PLAN (ADULT/PEDIATRIC) - D. IF YOU HAD ANY TYPE OF SEDATION, YOU MAY EXPERIENCE LIGHTHEADEDNESS, DIZZINESS, OR SLEEPINESS FOLLOWING YOUR PROCEDURE. A RESPONSIBLE ADULT SHOULD STAY WITH YOU FOR AT LEAST 24 HOURS FOLLOWING YOUR PROCEDURE.
Statement Selected

## 2025-03-17 NOTE — PRE-ANESTHESIA EVALUATION ADULT - NSANTHAPLANRD_GEN_ALL_CORE
Progress Note    Chief Complaint   Patient presents with   • Follow-up       Jordan is a 86 year old male who is here today for chronic back pain sees pain management, urologist cardiologies. Lipid on Atrovastatin    Patient Active Problem List   Diagnosis   • Anemia, unspecified   • Hiatal Hernia   • GI Bleeding   • Postbulbar ulcers   • Hypertension   • Coronary Artery Disease   • Hyperlipidemia   • Chronic low back pain   • Thoracic back pain   • Epigastric abdominal pain   • Dysuria   • Abdominal pain, right lower quadrant   • Calculus of kidney   • Pre-op evaluation   • Hemorrhoids, internal, with bleeding   • Bright red rectal bleeding   • Proctalgia fugax   • Chronic left shoulder pain   • Chronic fatigue   • Hypothyroid   • Umbilical hernia   • RUQ abdominal pain   • Diarrhea      Outpatient Medications Marked as Taking for the 10/8/21 encounter (Office Visit) with AnoopVIRGINIA Amaro, DO   Medication Sig Dispense Refill   • hydroCORTisone (ANUSOL-HC) 2.5 % rectal cream Place 1 application rectally 2 times daily. 30 g 3   • hydroCORTisone (ANUSOL-HC) 25 MG suppository Place 1 suppository rectally 2 times daily. 24 suppository 3   • fenofibrate (TRICOR) 145 MG tablet TAKE 1 TABLET BY MOUTH  DAILY 90 tablet 0   • atorvastatin (LIPITOR) 80 MG tablet TAKE 1 TABLET BY MOUTH  DAILY 90 tablet 0   • diclofenac (Voltaren) 1 % gel Apply 3 g topically 4 times daily. 300 g 3   • HYDROcodone-acetaminophen (NORCO)  MG per tablet Take 1 tablet by mouth every 6 hours as needed for Pain. 120 tablet 0   • gabapentin (NEURONTIN) 100 MG capsule TK 1 C PO QID     • lidocaine (XYLOCAINE) 5 % ointment APPLY TO AFFECTED AREA TWO TO THREE TIMES DAILY PRN  0   • terazosin (HYTRIN) 2 MG capsule   11   • polyethylene glycol (MIRALAX) powder Take 17 g by mouth daily. 3 Bottle 3   • esomeprazole (NEXIUM) 40 MG capsule Take 1 capsule by mouth daily (before breakfast). 90 capsule 1   • aspirin 81 MG tablet Take 81 mg by mouth daily.      • fish oil-omega-3 fatty acids 1000 MG CAPS Take 2 capsules by mouth daily.     • vitamin - therapeutic multivitamins w/minerals (CENTRUM SILVER,THERA-M) TABS Take 1 tablet by mouth daily.     • LYSINE ACETATE PO Take 1 capsule by mouth daily as needed. Indications: for cold sores        ALLERGIES:  No Known Allergies  Past Medical History:   Diagnosis Date   • Anemia, unspecified     probably secondary to acute and/or chronic gastrointestinal blood loss   • Arthritis    • BPH    • Chronic low back pain    • Chronic pain    • Colon Polyp    • Coronary Artery Disease    • Gastroesophageal reflux disease    • Hemorrhoids    • Hiatal Hernia 05/21/2007    Small fixed 1-2 cm in diameter without inflammation in the hernia pouch   • Hyperlipidemia    • Hypertension    • Malignant neoplasm (CMS/HCC)     skin cancer   • ROBBIE on CPAP 2008    has bipap, during fentynal patch use ,not using at this time 2018   • Thoracic back pain     displacement,thoracic disc w/o myelopathy      Past Surgical History:   Procedure Laterality Date   • Appendectomy  08/11/2010    w/extensive adhesiolysis   • Colonoscopy diagnostic  02/15/2010   • Colonoscopy remove lesion by snare  09/17/2007    Follow up 2012 - 3 diminuitive polyps, one with fragment of adenoma   • Coronary angioplasty with stent placement  11/01/2005    mid LAD   • Esophagogastroduodenoscopy transoral flex w/bx single or mult  09/17/2007    Antrum biopsies-no pathologic diagnosis   • Esophagogastroduodenoscopy transoral flex w/bx single or mult  05/21/2007    Post bulbar ulcer biopsy-reactive changes with focal fibroblastic proliferation and acute inflammatin negative for atypia or malignancy    • Excision of lingual tonsil     • Inj rx cerv/thoracic facet ea add nerve  08/14/2012    RF thoracic facet denervation   • Ir spine injection      left L5/S1 and S1; lumbar facet joint pain   • Ptca with stent  12/01/2005   • Tonsillectomy        Social History     Socioeconomic  History   • Marital status: Single     Spouse name: Not on file   • Number of children: Not on file   • Years of education: Not on file   • Highest education level: Not on file   Occupational History   • Not on file   Tobacco Use   • Smoking status: Former Smoker     Packs/day: 0.00     Quit date: 1989     Years since quittin.7   • Smokeless tobacco: Never Used   Vaping Use   • Vaping Use: never used   Substance and Sexual Activity   • Alcohol use: No     Alcohol/week: 1.7 standard drinks     Types: 1 Glasses of wine, 1 Cans of beer per week   • Drug use: No   • Sexual activity: Not on file   Other Topics Concern   •  Service Not Asked   • Blood Transfusions Not Asked   • Caffeine Concern Not Asked   • Occupational Exposure Not Asked   • Hobby Hazards Not Asked   • Sleep Concern Not Asked   • Stress Concern Not Asked   • Weight Concern Not Asked   • Special Diet Not Asked   • Back Care Not Asked   • Exercise Not Asked   • Bike Helmet Not Asked   • Seat Belt Yes   • Self-Exams Not Asked   Social History Narrative   • Not on file     Social Determinants of Health     Financial Resource Strain:    • Social Determinants: Financial Resource Strain: Not on file   Food Insecurity:    • Social Determinants: Food Insecurity: Not on file   Transportation Needs:    • Lack of Transportation (Medical): Not on file   • Lack of Transportation (Non-Medical): Not on file   Physical Activity:    • Days of Exercise per Week: Not on file   • Minutes of Exercise per Session: Not on file   Stress:    • Social Determinants: Stress: Not on file   Social Connections:    • Social Determinants: Social Connections: Not on file   Intimate Partner Violence: Not At Risk   • Social Determinants: Intimate Partner Violence Past Fear: No   • Social Determinants: Intimate Partner Violence Current Fear: No      Family History   Problem Relation Age of Onset   • Diabetes Mother         REVIEW OF SYSTEMS:  All systems reviewed and are  negative with the exception of the findings noted in the History of Present Illness.see HPI    Visit Vitals  /76   Pulse 70   Ht 5' 11\" (1.803 m)   Wt 93.1 kg (205 lb 4 oz)   SpO2 93%   BMI 28.63 kg/m²      PHYSICAL EXAM:  HEENT:  Normocephalic, atraumatic. Pupils equally reactive to light and accomodation. Extra Ocular Muscles intact.   Conjunctivae pink.  Mucous membranes moist and pink. Tympanic membrane visualized.  External auditory canal patent.  Throat non erythematous, dentures are intact. No palpable masses.  Respiratory:  Clear to auscultation and percussion bilaterally.No wheezing or rhonchi.  Normal inspiratory effort.   Cardiovascular:  Regular rate and rhythm.  Normal S1, S2.  No S3, S4.  No murmur.    Abdomen: soft, non tender, no palpable masses. Bowl sounds present and normal  Extremities:  No pallor.  No cyanosis. No edema or varicosities. Peripheral Vascular: pulses present. No ulcers or cyanosis  Neurologic:  no neurological deficits.  Skin: No skin lesions    LABS:  Lab Results   Component Value Date    SODIUM 139 07/01/2021    POTASSIUM 4.5 07/01/2021    CHLORIDE 107 07/01/2021    CO2 27 07/01/2021    BUN 17 07/01/2021    CREATININE 1.07 07/01/2021    GLUCOSE 105 (H) 07/01/2021     Lab Results   Component Value Date    WBC 6.7 07/01/2021    HCT 43.4 07/01/2021    HGB 14.5 07/01/2021     07/01/2021     Lab Results   Component Value Date    GLUCOSE 105 (H) 07/01/2021    SODIUM 139 07/01/2021    POTASSIUM 4.5 07/01/2021    CHLORIDE 107 07/01/2021    BUN 17 07/01/2021    CREATININE 1.07 07/01/2021    CALCIUM 9.2 07/01/2021    ALBUMIN 4.3 07/01/2021    AST 25 07/01/2021    ALKPT 50 07/01/2021    GPT 33 07/01/2021    ANIONGAP 10 07/01/2021    BCRAT 16 07/01/2021    GLOB 2.7 07/01/2021    AGR 1.6 07/01/2021     Lab Results   Component Value Date    CHOLESTEROL 155 07/01/2021    HDL 37 (L) 07/01/2021    CALCLDL 88 07/01/2021    TRIGLYCERIDE 150 (H) 07/01/2021       IMAGING:  MRI Thoracic  spine Results for orders placed during the hospital encounter of 18    MRI Thoracic Spine    Impression  1. Minimal disc bulging, T4-5 and T5-6, with slight flattening of the cord  with no abnormal cord signal. No paraspinous masses or cord abnormalities  are otherwise appreciated.  2. Suspect probable cysts of the right kidney. and MRI Lumbar spine  Results for orders placed during the hospital encounter of 18    MRI Lumbar Spine    Impression  Mild multilevel disc bulging, with slight encroachment upon the  canal and neural foramina. Mild-to-moderate canal stenosis is noted,  especially at L2-3, L3-4, and L4-5.    PROCEDURES:  Results for orders placed or performed in visit on 04/10/18   ELECTROCARDIOGRAM 12-LEAD    Narrative    Test was performed.  :1935  AGE:82 year old  Ordering provider: Dr. Amaro   Diagnosis: Z01.818 Preop examination  (primary encounter diagnosis)      Impression    Sinus bradycardia rate of 58 axis -45. Level 0.1 for cure 0.08 q.d. 0.30 ST T waves are normal  Impression sinus bradycardia left anterior fascicular block. A stable EKG        ASSESSMENT:  Atherosclerosis of coronary artery of native heart, unspecified vessel or lesion type, unspecified whether angina present    Hypertension    Hyperlipidemia    Hypothyroidism due to acquired atrophy of thyroid    Chronic bilateral thoracic back pain    Chronic bilateral low back pain without sciatica    Chronic left shoulder pain    Dysuria       PLAN:  Update lab continue with pain management  Patient get hydrocodone from pain management     30 minutes were spent in this encounter.    Jordan Amaro DO    general

## 2025-03-17 NOTE — ASU DISCHARGE PLAN (ADULT/PEDIATRIC) - CARE PROVIDER_API CALL
Meliton Hannah  Plastic Surgery  [address]  [place] [address]  Phone: [phone]  Fax: [phone]  Follow Up Time:   
no

## 2025-03-17 NOTE — ASU DISCHARGE PLAN (ADULT/PEDIATRIC) - FINANCIAL ASSISTANCE
[facility] provides services at a reduced cost to those who are determined to be eligible through [facility]’s financial assistance program. Information regarding [facility]’s financial assistance program can be found by going to [email or web address] or by calling [phone].

## 2025-03-26 NOTE — ED ADULT NURSE NOTE - NSFALLRSKASSESSDT_ED_ALL_ED
Letter:  the additional testing for the neuropathy so far are negative. Some additional labs are pending of which the results will determine any additional recommendations.  Continue with the aforementioned recommendations. Have a great week.  26-Nov-2020 12:08

## 2025-06-23 PROBLEM — Z86.39 PERSONAL HISTORY OF OTHER ENDOCRINE, NUTRITIONAL AND METABOLIC DISEASE: Chronic | Status: ACTIVE | Noted: 2025-03-17

## 2025-06-26 NOTE — ASU PATIENT PROFILE, ADULT - FALL HARM RISK - UNIVERSAL INTERVENTIONS
Bed in lowest position, wheels locked, appropriate side rails in place/Call bell, personal items and telephone in reach/Instruct patient to call for assistance before getting out of bed or chair/Non-slip footwear when patient is out of bed/[place] to call system/Physically safe environment - no spills, clutter or unnecessary equipment/Purposeful Proactive Rounding/Room/bathroom lighting operational, light cord in reach

## 2025-06-26 NOTE — ASU PATIENT PROFILE, ADULT - NS PREOP UNDERSTANDS INFO
NPO solids after midnight 6/26/25, stop clears after 9:15 am on DOS, bring insurance card and photo ID, Escort to bring photo ID, address and phone # reviewed with pt./yes

## 2025-06-27 ENCOUNTER — OUTPATIENT (OUTPATIENT)
Dept: OUTPATIENT SERVICES | Facility: HOSPITAL | Age: 60
LOS: 1 days | End: 2025-06-27

## 2025-06-27 ENCOUNTER — TRANSCRIPTION ENCOUNTER (OUTPATIENT)
Age: 60
End: 2025-06-27

## 2025-06-27 VITALS
HEART RATE: 55 BPM | RESPIRATION RATE: 17 BRPM | SYSTOLIC BLOOD PRESSURE: 99 MMHG | OXYGEN SATURATION: 99 % | DIASTOLIC BLOOD PRESSURE: 53 MMHG

## 2025-06-27 VITALS
SYSTOLIC BLOOD PRESSURE: 138 MMHG | OXYGEN SATURATION: 99 % | DIASTOLIC BLOOD PRESSURE: 60 MMHG | TEMPERATURE: 98 F | WEIGHT: 183.87 LBS | RESPIRATION RATE: 16 BRPM | HEART RATE: 64 BPM | HEIGHT: 64 IN

## 2025-06-27 DIAGNOSIS — Z98.890 OTHER SPECIFIED POSTPROCEDURAL STATES: Chronic | ICD-10-CM

## 2025-06-27 DIAGNOSIS — Z98.891 HISTORY OF UTERINE SCAR FROM PREVIOUS SURGERY: Chronic | ICD-10-CM

## 2025-06-27 DIAGNOSIS — Z98.51 TUBAL LIGATION STATUS: Chronic | ICD-10-CM

## 2025-06-27 PROCEDURE — 99291 CRITICAL CARE FIRST HOUR: CPT

## 2025-06-27 DEVICE — ARISTA ENT KIT (1) 2G HEMADERM AND (2) FLEXITIP APPLICATOR: Type: IMPLANTABLE DEVICE | Site: BILATERAL | Status: FUNCTIONAL

## 2025-06-27 RX ORDER — ENOXAPARIN SODIUM 100 MG/ML
40 INJECTION SUBCUTANEOUS ONCE
Refills: 0 | Status: COMPLETED | OUTPATIENT
Start: 2025-06-27 | End: 2025-06-27

## 2025-06-27 RX ORDER — OXYCODONE HYDROCHLORIDE 30 MG/1
5 TABLET ORAL ONCE
Refills: 0 | Status: DISCONTINUED | OUTPATIENT
Start: 2025-06-27 | End: 2025-06-27

## 2025-06-27 RX ORDER — FENTANYL CITRATE-0.9 % NACL/PF 100MCG/2ML
25 SYRINGE (ML) INTRAVENOUS
Refills: 0 | Status: DISCONTINUED | OUTPATIENT
Start: 2025-06-27 | End: 2025-06-27

## 2025-06-27 RX ORDER — ACETAMINOPHEN 500 MG/5ML
1000 LIQUID (ML) ORAL ONCE
Refills: 0 | Status: COMPLETED | OUTPATIENT
Start: 2025-06-27 | End: 2025-06-27

## 2025-06-27 RX ORDER — APREPITANT 40 MG/1
40 CAPSULE ORAL ONCE
Refills: 0 | Status: COMPLETED | OUTPATIENT
Start: 2025-06-27 | End: 2025-06-27

## 2025-06-27 RX ORDER — SODIUM CHLORIDE 9 G/1000ML
500 INJECTION, SOLUTION INTRAVENOUS
Refills: 0 | Status: ACTIVE | OUTPATIENT
Start: 2025-06-27 | End: 2026-05-26

## 2025-06-27 RX ORDER — BUPIVACAINE 13.3 MG/ML
20 INJECTION, SUSPENSION, LIPOSOMAL INFILTRATION ONCE
Refills: 0 | Status: ACTIVE | OUTPATIENT
Start: 2025-06-27

## 2025-06-27 RX ORDER — SODIUM CHLORIDE 9 G/1000ML
1000 INJECTION, SOLUTION INTRAVENOUS ONCE
Refills: 0 | Status: ACTIVE | OUTPATIENT
Start: 2025-06-27 | End: 2025-06-27

## 2025-06-27 RX ORDER — SODIUM CHLORIDE 9 G/1000ML
1000 INJECTION, SOLUTION INTRAVENOUS ONCE
Refills: 0 | Status: COMPLETED | OUTPATIENT
Start: 2025-06-27 | End: 2025-06-27

## 2025-06-27 RX ADMIN — Medication 25 MICROGRAM(S): at 17:30

## 2025-06-27 RX ADMIN — SODIUM CHLORIDE 1000 MILLILITER(S): 9 INJECTION, SOLUTION INTRAVENOUS at 21:12

## 2025-06-27 RX ADMIN — ENOXAPARIN SODIUM 40 MILLIGRAM(S): 100 INJECTION SUBCUTANEOUS at 11:56

## 2025-06-27 RX ADMIN — APREPITANT 40 MILLIGRAM(S): 40 CAPSULE ORAL at 12:09

## 2025-06-27 RX ADMIN — OXYCODONE HYDROCHLORIDE 5 MILLIGRAM(S): 30 TABLET ORAL at 18:40

## 2025-06-27 RX ADMIN — Medication 400 MILLIGRAM(S): at 18:40

## 2025-06-27 RX ADMIN — Medication 25 MICROGRAM(S): at 17:35

## 2025-06-27 NOTE — ASU DISCHARGE PLAN (ADULT/PEDIATRIC) - CARE PROVIDER_API CALL
Meliton Hannah  Plastic Surgery  [address] [place] [id number]  Phone: [phone]  Fax: [phone]  Follow Up Time: 1 week

## 2025-06-27 NOTE — ASU DISCHARGE PLAN (ADULT/PEDIATRIC) - FINANCIAL ASSISTANCE
[facility] provides services at a reduced cost to those who are determined to be eligible through [facility]’s financial assistance program. Information regarding [facility]’s financial assistance program can be found by going to [email or web address] or by calling [phone].

## 2025-06-27 NOTE — ASU DISCHARGE PLAN (ADULT/PEDIATRIC) - NS MD DC FALL RISK RISK
For information on Fall & Injury Prevention, visit: [email or web address] OR  [email or web address] OR  https://www.cdc.gov/steadi/patient.html

## 2025-06-27 NOTE — ANESTHESIA FOLLOW-UP NOTE - NSPROCEDUREFT_GEN_ALL_CORE
60 yo female with hx T2DM, HTN, HLD, obesity, thyroid nodule, OP, vit D Def, carpal tunnel, intraductal carcinoma,     PsxH tubal ligation, lumpectomy,     Hb on istat 10.9 down from 13.8  fs297    [place] drain output Left 100 and right 25   pt took metformin 100 mg PO  She received 2 L intraop and 2 L postop in PACU    Pt is now sp panniculectomy for erythema intertrigo who is p/w 2 syncopal epidoses, one was near syncope with diaphoresis and second one accompanied by a loss of responsiveness, we mannually ventilated her , she never had any desaturation during the episode and perked up after jaw thrust. 60 yo female with hx T2DM, HTN, HLD, obesity, thyroid nodule, OP, vit D Def, carpal tunnel, intraductal carcinoma,     PsxH tubal ligation, lumpectomy,     Hb on istat 10.9 down from 13.8  [id number]    [place] drain output Left 100 and right 25   pt took metformin 100 mg PO  She received 2 L intraop and 2 L postop in PACU    Pt is now sp panniculectomy for erythema intertrigo who is p/w 2 syncopal epidoses, one was near syncope with diaphoresis and second one accompanied by a loss of responsiveness, we mannually ventilated her , she never had any desaturation during the episode and perked up after jaw thrust.    Pt is still slightly lethargic and complaining of pain in her abdomen,  gave report to Dr Aguilar and Dr Hannah made aware. 60 yo female with hx T2DM, HTN, HLD, obesity, thyroid nodule, OP, vit D Def, carpal tunnel, intraductal carcinoma,     PsxH tubal ligation, lumpectomy,     Hb on istat 10.9 down from 13.8  [id number]    [place] drain output Left 100 and right 25   pt took metformin 100 mg PO  She received 2 L intraop and 2 L postop in PACU    Pt is now sp panniculectomy for erythema intertrigo who is p/w 2 syncopal epidoses, one was near syncope with diaphoresis and second one accompanied by a loss of responsiveness, we mannually ventilated her , she never had any desaturation during the episode and perked up after jaw thrust.    Pt is still slightly lethargic and complaining of pain in her abdomen,  gave report to Dr Aguilar and Dr Hannah made aware.    Per patients daughter she has had multiple fainting spells in the past, pt  reports she had an ECHO a few months ago for her breast surgery. 60 yo female with hx T2DM, HTN, HLD, obesity, thyroid nodule, OP, vit D Def, carpal tunnel, intraductal carcinoma,     PsxH tubal ligation, lumpectomy,     Hb on istat 10.9 down from 13.8  [id number]    [place] drain output Left 100 and right 25   pt took metformin 100 mg PO  She received 2 L intraop and 2 L postop in PACU    Pt is now sp panniculectomy for erythema intertrigo who is p/w 2 syncopal epidoses, one was near syncope with diaphoresis and second one accompanied by a loss of responsiveness, we mannually ventilated her , she never had any desaturation during the episode and perked up after jaw thrust.    Pt is still slightly lethargic and complaining of pain in her abdomen,  gave report to Dr Aguilar and Dr Hannah made aware.    Per patients daughter she has had multiple fainting spells in the past, pt  reports she had an ECHO a few months ago for her breast surgery.    Per NEURO NOTE: She has a history of poorly controlled diabetes mellitus. She returns today with new complaints of a "pulsating sensation for years" in her head. Denies headache. She does have a history of a left vertebral artery stenosis and was evaluated by vascular neurosurgeon, Dr. Guy Jeronimo 5 years ago 60 yo female with hx T2DM, HTN, HLD, obesity, thyroid nodule, OP, vit D Def, carpal tunnel, intraductal carcinoma,     PsxH tubal ligation, lumpectomy,     Hb on istat 10.9 down from 13.8  fs297    [place] drain output Left 100 and right 25   pt took metformin 100 mg PO  She received 2 L intraop and 2 L postop in PACU    Pt is now sp panniculectomy for erythema intertrigo who is p/w 2 syncopal epidoses, one was near syncope with diaphoresis and second one accompanied by a loss of responsiveness, we mannually ventilated her , she never had any desaturation during the episode and perked up after jaw thrust.    Pt is still slightly lethargic and complaining of pain in her abdomen,  gave report to Dr Aguilar and Dr Hannah made aware.    Per patients daughter she has had multiple fainting spells in the past, pt  reports she had an ECHO a few months ago for her breast surgery.    Per NEURO NOTE: She has a history of poorly controlled diabetes mellitus. She returns today with new complaints of a "pulsating sensation for years" in her head. Denies headache. She does have a history of a left vertebral artery stenosis and was evaluated by vascular neurosurgeon, Dr. Guy Jeronimo 5 years ago    Buffy Bean is a 53yr old female here today discuss the results of her MRA Neck NOVA. She still complains of dizziness but it is not reproducible. She has no other new symptoms. HPI: Buffy Bean is a 52yr old female, right handed who was being worked up for dizziness since last year 2017. One month ago the dizziness started again with severe room spinning and one episode of syncope. This sensation is reproducible sometimes She also gets severe pulsing sensation in her head and behind her eyes. She was refereed to Dr. Farfan after ENT who sent her for an MRI/MRA of her brain, which showed Left vertebral artery stenosis.Neurologist: Dr. Tolbert Surgeon: Skyla BlackburnCP: Rob GalavizENT: Vic Martins 58 yo female with hx T2DM, HTN, HLD, obesity, thyroid nodule, OP, vit D Def, carpal tunnel, intraductal carcinoma,     PsxH tubal ligation, lumpectomy,     Hb on istat 10.9 down from 13.8  [id number]    [place] drain output Left 100 and right 25   pt took metformin 100 mg PO  She received 2 L intraop and 2 L postop in PACU    Pt is now sp panniculectomy for erythema intertrigo who is p/w 2 syncopal epidoses, one was near syncope with diaphoresis and second one accompanied by a loss of responsiveness, we mannually ventilated her , she never had any desaturation during the episode and perked up after jaw thrust.    Pt is still slightly lethargic and complaining of pain in her abdomen,  gave report to Dr Aguilar and Dr Hannah made aware.    Per patients daughter she has had multiple fainting spells in the past, pt  reports she had an ECHO a few months ago for her breast surgery.    Per NEURO NOTE : She has a history of poorly controlled diabetes mellitus. She returns today with new complaints of a "pulsating sensation for years" in her head. Denies headache. She does have a history of a left vertebral artery stenosis and was evaluated by vascular neurosurgeon, Dr. Guy Jeronimo 5 years ago    Buffy Bean is a 53yr old female here today discuss the results of her MRA Neck NOVA. She still complains of dizziness but it is not reproducible. She has no other new symptoms. HPI: Buffy Bean is a 52yr old female, right handed who was being worked up for dizziness since last year 2017. One month ago the dizziness started again with severe room spinning and one episode of syncope. This sensation is reproducible sometimes She also gets severe pulsing sensation in her head and behind her eyes. She was refereed to Dr. Farfan after ENT who sent her for an MRI/MRA of her brain, which showed Left vertebral artery stenosis.Neurologist: Dr. Tolbert Surgeon: Skyla BlackburnCP: Rob GalavizENT: Vic Martins 60 yo female with hx T2DM, HTN, HLD, obesity, thyroid nodule, OP, vit D Def, carpal tunnel, intraductal carcinoma, poor historian    PsxH tubal ligation, lumpectomy,     Hb on istat 10.9 down from 13.8  [id number]    [place] drain output Left 100 and right 25   pt took metformin 100 mg PO  She received 2 L intraop and 2 L postop in PACU    Pt is now sp panniculectomy for erythema intertrigo who is p/w 2 syncopal epidoses, one was near syncope with diaphoresis and second one accompanied by a loss of responsiveness, we mannually ventilated her , she never had any desaturation during the episode and perked up after jaw thrust.    Pt is still slightly lethargic and complaining of pain in her abdomen,  gave report to Dr Aguilar and Dr Hannah made aware.    Per patients daughter she has had multiple fainting spells in the past, pt  reports she had an ECHO a few months ago for her breast surgery.    Per NEURO NOTE : She has a history of poorly controlled diabetes mellitus. She returns today with new complaints of a "pulsating sensation for years" in her head. Denies headache. She does have a history of a left vertebral artery stenosis and was evaluated by vascular neurosurgeon, Dr. Guy Jeronimo 5 years ago    Buffy Bean is a 53yr old female here today discuss the results of her MRA Neck NOVA. She still complains of dizziness but it is not reproducible. She has no other new symptoms. HPI: Buffy Bean is a 52yr old female, right handed who was being worked up for dizziness since last year 2017. One month ago the dizziness started again with severe room spinning and one episode of syncope. This sensation is reproducible sometimes She also gets severe pulsing sensation in her head and behind her eyes. She was refereed to Dr. Farfan after ENT who sent her for an MRI/MRA of her brain, which showed Left vertebral artery stenosis.Neurologist: Dr. Tolbert Surgeon: Skyla BlackburnCP: Rob GalavizENT: Vic Martins 58 yo female with hx T2DM, HTN, HLD, obesity, thyroid nodule, OP, vit D Def, carpal tunnel, intraductal carcinoma, poor historian    Hb 13.9-->10.1-->9.1    PsxH tubal ligation, lumpectomy,     Hb on istat 10.9 down from 13.8  [id number]    [place] drain output Left 100 and right 25   pt took metformin 100 mg PO  She received 2 L intraop and 2 L postop in PACU    Pt is now sp panniculectomy for erythema intertrigo who is p/w 2 syncopal epidoses, one was near syncope with diaphoresis and second one accompanied by a loss of responsiveness, we mannually ventilated her , she never had any desaturation during the episode and perked up after jaw thrust.    Pt is still slightly lethargic and complaining of pain in her abdomen,  gave report to Dr Aguilar and Dr Hannah made aware.    Per patients daughter she has had multiple fainting spells in the past, pt  reports she had an ECHO a few months ago for her breast surgery.    Per NEURO NOTE : She has a history of poorly controlled diabetes mellitus. She returns today with new complaints of a "pulsating sensation for years" in her head. Denies headache. She does have a history of a left vertebral artery stenosis and was evaluated by vascular neurosurgeon, Dr. Guy Jeronimo 5 years ago    Buffy Bean is a 53yr old female here today discuss the results of her MRA Neck NOVA. She still complains of dizziness but it is not reproducible. She has no other new symptoms. HPI: Buffy Bean is a 52yr old female, right handed who was being worked up for dizziness since last year 2017. One month ago the dizziness started again with severe room spinning and one episode of syncope. This sensation is reproducible sometimes She also gets severe pulsing sensation in her head and behind her eyes. She was refereed to Dr. Farfan after ENT who sent her for an MRI/MRA of her brain, which showed Left vertebral artery stenosis.Neurologist: Dr. Tolbert Surgeon: Skyla BlackburnCP: Rob HullaniENT: Vic Martins

## 2025-06-27 NOTE — ASU DISCHARGE PLAN (ADULT/PEDIATRIC) - ASU DC SPECIAL INSTRUCTIONSFT
Follow up with Dr. Hannah as instructed by his office. Call the office at the number below to schedule your appointment. No tub bathing or swimming until cleared. Keep incision sites out of the sun as scars will darken. Ambulate as tolerated, but no heavy lifting (>10lbs) or strenuous exercise. You may resume regular diet. You should be urinating at least 3-4x per day. Call the office if you experience increasing abdominal pain, nausea, vomiting, or temperature >101 F.

## 2025-06-28 ENCOUNTER — INPATIENT (INPATIENT)
Facility: HOSPITAL | Age: 60
LOS: 2 days | Discharge: HOME | End: 2025-07-01
Attending: STUDENT IN AN ORGANIZED HEALTH CARE EDUCATION/TRAINING PROGRAM | Admitting: STUDENT IN AN ORGANIZED HEALTH CARE EDUCATION/TRAINING PROGRAM
Payer: COMMERCIAL

## 2025-06-28 VITALS
HEIGHT: 64 IN | WEIGHT: 182.98 LBS | HEART RATE: 50 BPM | SYSTOLIC BLOOD PRESSURE: 79 MMHG | DIASTOLIC BLOOD PRESSURE: 52 MMHG | RESPIRATION RATE: 20 BRPM | OXYGEN SATURATION: 98 %

## 2025-06-28 DIAGNOSIS — Z98.891 HISTORY OF UTERINE SCAR FROM PREVIOUS SURGERY: Chronic | ICD-10-CM

## 2025-06-28 DIAGNOSIS — Z98.51 TUBAL LIGATION STATUS: Chronic | ICD-10-CM

## 2025-06-28 DIAGNOSIS — Z98.890 OTHER SPECIFIED POSTPROCEDURAL STATES: Chronic | ICD-10-CM

## 2025-06-28 LAB
A1C WITH ESTIMATED AVERAGE GLUCOSE RESULT: 7.4 % — HIGH (ref 4–5.6)
ANION GAP SERPL CALC-SCNC: 10 MMOL/L — SIGNIFICANT CHANGE UP (ref 5–17)
ANION GAP SERPL CALC-SCNC: 7 MMOL/L — SIGNIFICANT CHANGE UP (ref 5–17)
APPEARANCE UR: CLEAR — SIGNIFICANT CHANGE UP
APTT BLD: 23.9 SEC — LOW (ref 26.1–36.8)
APTT BLD: 25.9 SEC — LOW (ref 26.1–36.8)
BASOPHILS # BLD AUTO: 0.02 K/UL — SIGNIFICANT CHANGE UP (ref 0–0.2)
BASOPHILS NFR BLD AUTO: 0.1 % — SIGNIFICANT CHANGE UP (ref 0–2)
BILIRUB UR-MCNC: NEGATIVE — SIGNIFICANT CHANGE UP
BLD GP AB SCN SERPL QL: NEGATIVE — SIGNIFICANT CHANGE UP
BUN SERPL-MCNC: 10 MG/DL — SIGNIFICANT CHANGE UP (ref 7–23)
BUN SERPL-MCNC: 11 MG/DL — SIGNIFICANT CHANGE UP (ref 7–23)
CALCIUM SERPL-MCNC: 6.8 MG/DL — LOW (ref 8.4–10.5)
CALCIUM SERPL-MCNC: 7.3 MG/DL — LOW (ref 8.4–10.5)
CHLORIDE SERPL-SCNC: 104 MMOL/L — SIGNIFICANT CHANGE UP (ref 96–108)
CHLORIDE SERPL-SCNC: 111 MMOL/L — HIGH (ref 96–108)
CO2 SERPL-SCNC: 21 MMOL/L — LOW (ref 22–31)
CO2 SERPL-SCNC: 23 MMOL/L — SIGNIFICANT CHANGE UP (ref 22–31)
COLOR SPEC: YELLOW — SIGNIFICANT CHANGE UP
CREAT SERPL-MCNC: 0.67 MG/DL — SIGNIFICANT CHANGE UP (ref 0.5–1.3)
CREAT SERPL-MCNC: 0.72 MG/DL — SIGNIFICANT CHANGE UP (ref 0.5–1.3)
DIFF PNL FLD: NEGATIVE — SIGNIFICANT CHANGE UP
EGFR: 102 ML/MIN/1.73M2 — SIGNIFICANT CHANGE UP
EGFR: 102 ML/MIN/1.73M2 — SIGNIFICANT CHANGE UP
EGFR: 97 ML/MIN/1.73M2 — SIGNIFICANT CHANGE UP
EGFR: 97 ML/MIN/1.73M2 — SIGNIFICANT CHANGE UP
EOSINOPHIL # BLD AUTO: 0 K/UL — SIGNIFICANT CHANGE UP (ref 0–0.5)
EOSINOPHIL NFR BLD AUTO: 0 % — SIGNIFICANT CHANGE UP (ref 0–6)
ESTIMATED AVERAGE GLUCOSE: 166 MG/DL — HIGH (ref 68–114)
GAS PNL BLDV: SIGNIFICANT CHANGE UP
GLUCOSE SERPL-MCNC: 140 MG/DL — HIGH (ref 70–99)
GLUCOSE SERPL-MCNC: 329 MG/DL — HIGH (ref 70–99)
GLUCOSE UR QL: >=1000 MG/DL
HCT VFR BLD CALC: 23.2 % — LOW (ref 34.5–45)
HCT VFR BLD CALC: 24.2 % — LOW (ref 34.5–45)
HCT VFR BLD CALC: 25.2 % — LOW (ref 34.5–45)
HCT VFR BLD CALC: 28.3 % — LOW (ref 34.5–45)
HGB BLD-MCNC: 7.7 G/DL — LOW (ref 11.5–15.5)
HGB BLD-MCNC: 8 G/DL — LOW (ref 11.5–15.5)
HGB BLD-MCNC: 8.4 G/DL — LOW (ref 11.5–15.5)
HGB BLD-MCNC: 9.2 G/DL — LOW (ref 11.5–15.5)
IMM GRANULOCYTES # BLD AUTO: 0.07 K/UL — SIGNIFICANT CHANGE UP (ref 0–0.07)
IMM GRANULOCYTES NFR BLD AUTO: 0.4 % — SIGNIFICANT CHANGE UP (ref 0–0.9)
IMMATURE PLATELET FRACTION #: 5.9 K/UL — SIGNIFICANT CHANGE UP (ref 4.7–11.1)
IMMATURE PLATELET FRACTION %: 11.7 % — HIGH (ref 1.6–4.9)
INR BLD: 1.15 — SIGNIFICANT CHANGE UP (ref 0.85–1.16)
INR BLD: 1.25 — HIGH (ref 0.85–1.16)
KETONES UR QL: NEGATIVE MG/DL — SIGNIFICANT CHANGE UP
LACTATE SERPL-SCNC: 1.9 MMOL/L — SIGNIFICANT CHANGE UP (ref 0.5–2)
LACTATE SERPL-SCNC: 2.1 MMOL/L — HIGH (ref 0.5–2)
LEUKOCYTE ESTERASE UR-ACNC: NEGATIVE — SIGNIFICANT CHANGE UP
LYMPHOCYTES # BLD AUTO: 1.12 K/UL — SIGNIFICANT CHANGE UP (ref 1–3.3)
LYMPHOCYTES NFR BLD AUTO: 5.9 % — LOW (ref 13–44)
MAGNESIUM SERPL-MCNC: 1.5 MG/DL — LOW (ref 1.6–2.6)
MCHC RBC-ENTMCNC: 29 PG — SIGNIFICANT CHANGE UP (ref 27–34)
MCHC RBC-ENTMCNC: 29.4 PG — SIGNIFICANT CHANGE UP (ref 27–34)
MCHC RBC-ENTMCNC: 29.4 PG — SIGNIFICANT CHANGE UP (ref 27–34)
MCHC RBC-ENTMCNC: 30.1 PG — SIGNIFICANT CHANGE UP (ref 27–34)
MCHC RBC-ENTMCNC: 32.5 G/DL — SIGNIFICANT CHANGE UP (ref 32–36)
MCHC RBC-ENTMCNC: 33.1 G/DL — SIGNIFICANT CHANGE UP (ref 32–36)
MCHC RBC-ENTMCNC: 33.2 G/DL — SIGNIFICANT CHANGE UP (ref 32–36)
MCHC RBC-ENTMCNC: 33.3 G/DL — SIGNIFICANT CHANGE UP (ref 32–36)
MCV RBC AUTO: 88.5 FL — SIGNIFICANT CHANGE UP (ref 80–100)
MCV RBC AUTO: 89 FL — SIGNIFICANT CHANGE UP (ref 80–100)
MCV RBC AUTO: 89.3 FL — SIGNIFICANT CHANGE UP (ref 80–100)
MCV RBC AUTO: 90.3 FL — SIGNIFICANT CHANGE UP (ref 80–100)
MONOCYTES # BLD AUTO: 1.3 K/UL — HIGH (ref 0–0.9)
MONOCYTES NFR BLD AUTO: 6.9 % — SIGNIFICANT CHANGE UP (ref 2–14)
NEUTROPHILS # BLD AUTO: 16.34 K/UL — HIGH (ref 1.8–7.4)
NEUTROPHILS NFR BLD AUTO: 86.7 % — HIGH (ref 43–77)
NITRITE UR-MCNC: NEGATIVE — SIGNIFICANT CHANGE UP
NRBC # BLD AUTO: 0 K/UL — SIGNIFICANT CHANGE UP (ref 0–0)
NRBC # FLD: 0 K/UL — SIGNIFICANT CHANGE UP (ref 0–0)
NRBC BLD AUTO-RTO: 0 /100 WBCS — SIGNIFICANT CHANGE UP (ref 0–0)
PH UR: 5.5 — SIGNIFICANT CHANGE UP (ref 5–8)
PHOSPHATE SERPL-MCNC: 4.3 MG/DL — SIGNIFICANT CHANGE UP (ref 2.5–4.5)
PLATELET # BLD AUTO: 153 K/UL — SIGNIFICANT CHANGE UP (ref 150–400)
PLATELET # BLD AUTO: 157 K/UL — SIGNIFICANT CHANGE UP (ref 150–400)
PLATELET # BLD AUTO: 160 K/UL — SIGNIFICANT CHANGE UP (ref 150–400)
PLATELET # BLD AUTO: 50 K/UL — LOW (ref 150–400)
PMV BLD: 11.3 FL — SIGNIFICANT CHANGE UP (ref 7–13)
PMV BLD: 11.3 FL — SIGNIFICANT CHANGE UP (ref 7–13)
PMV BLD: 11.4 FL — SIGNIFICANT CHANGE UP (ref 7–13)
PMV BLD: 11.8 FL — SIGNIFICANT CHANGE UP (ref 7–13)
POTASSIUM SERPL-MCNC: 3.8 MMOL/L — SIGNIFICANT CHANGE UP (ref 3.5–5.3)
POTASSIUM SERPL-MCNC: 4.3 MMOL/L — SIGNIFICANT CHANGE UP (ref 3.5–5.3)
POTASSIUM SERPL-SCNC: 3.8 MMOL/L — SIGNIFICANT CHANGE UP (ref 3.5–5.3)
POTASSIUM SERPL-SCNC: 4.3 MMOL/L — SIGNIFICANT CHANGE UP (ref 3.5–5.3)
PROT UR-MCNC: NEGATIVE MG/DL — SIGNIFICANT CHANGE UP
PROTHROM AB SERPL-ACNC: 13.4 SEC — SIGNIFICANT CHANGE UP (ref 9.9–13.4)
PROTHROM AB SERPL-ACNC: 14.4 SEC — HIGH (ref 9.9–13.4)
RBC # BLD: 2.62 M/UL — LOW (ref 3.8–5.2)
RBC # BLD: 2.72 M/UL — LOW (ref 3.8–5.2)
RBC # BLD: 2.79 M/UL — LOW (ref 3.8–5.2)
RBC # BLD: 3.17 M/UL — LOW (ref 3.8–5.2)
RBC # FLD: 13 % — SIGNIFICANT CHANGE UP (ref 10.3–14.5)
RBC # FLD: 13.2 % — SIGNIFICANT CHANGE UP (ref 10.3–14.5)
RBC # FLD: 13.3 % — SIGNIFICANT CHANGE UP (ref 10.3–14.5)
RBC # FLD: 13.3 % — SIGNIFICANT CHANGE UP (ref 10.3–14.5)
RH IG SCN BLD-IMP: POSITIVE — SIGNIFICANT CHANGE UP
SODIUM SERPL-SCNC: 134 MMOL/L — LOW (ref 135–145)
SODIUM SERPL-SCNC: 142 MMOL/L — SIGNIFICANT CHANGE UP (ref 135–145)
SP GR SPEC: >1.03 — HIGH (ref 1–1.03)
UROBILINOGEN FLD QL: 0.2 MG/DL — SIGNIFICANT CHANGE UP (ref 0.2–1)
WBC # BLD: 18.85 K/UL — HIGH (ref 3.8–10.5)
WBC # BLD: 7.23 K/UL — SIGNIFICANT CHANGE UP (ref 3.8–10.5)
WBC # BLD: 8.42 K/UL — SIGNIFICANT CHANGE UP (ref 3.8–10.5)
WBC # BLD: 9.62 K/UL — SIGNIFICANT CHANGE UP (ref 3.8–10.5)
WBC # FLD AUTO: 18.85 K/UL — HIGH (ref 3.8–10.5)
WBC # FLD AUTO: 7.23 K/UL — SIGNIFICANT CHANGE UP (ref 3.8–10.5)
WBC # FLD AUTO: 8.42 K/UL — SIGNIFICANT CHANGE UP (ref 3.8–10.5)
WBC # FLD AUTO: 9.62 K/UL — SIGNIFICANT CHANGE UP (ref 3.8–10.5)

## 2025-06-28 PROCEDURE — 85025 COMPLETE CBC W/AUTO DIFF WBC: CPT

## 2025-06-28 PROCEDURE — 82803 BLOOD GASES ANY COMBINATION: CPT

## 2025-06-28 PROCEDURE — 84295 ASSAY OF SERUM SODIUM: CPT

## 2025-06-28 PROCEDURE — 84132 ASSAY OF SERUM POTASSIUM: CPT

## 2025-06-28 PROCEDURE — 85730 THROMBOPLASTIN TIME PARTIAL: CPT

## 2025-06-28 PROCEDURE — 80048 BASIC METABOLIC PNL TOTAL CA: CPT

## 2025-06-28 PROCEDURE — 82330 ASSAY OF CALCIUM: CPT

## 2025-06-28 PROCEDURE — 82962 GLUCOSE BLOOD TEST: CPT

## 2025-06-28 PROCEDURE — 81003 URINALYSIS AUTO W/O SCOPE: CPT

## 2025-06-28 PROCEDURE — 83735 ASSAY OF MAGNESIUM: CPT

## 2025-06-28 PROCEDURE — 83605 ASSAY OF LACTIC ACID: CPT

## 2025-06-28 PROCEDURE — 36415 COLL VENOUS BLD VENIPUNCTURE: CPT

## 2025-06-28 PROCEDURE — 83036 HEMOGLOBIN GLYCOSYLATED A1C: CPT

## 2025-06-28 PROCEDURE — 74174 CTA ABD&PLVS W/CONTRAST: CPT | Mod: 26

## 2025-06-28 PROCEDURE — 85027 COMPLETE CBC AUTOMATED: CPT

## 2025-06-28 PROCEDURE — 85610 PROTHROMBIN TIME: CPT

## 2025-06-28 PROCEDURE — 84100 ASSAY OF PHOSPHORUS: CPT

## 2025-06-28 RX ORDER — SODIUM CHLORIDE 9 G/1000ML
2000 INJECTION, SOLUTION INTRAVENOUS ONCE
Refills: 0 | Status: COMPLETED | OUTPATIENT
Start: 2025-06-28 | End: 2025-06-28

## 2025-06-28 RX ORDER — GLUCAGON 3 MG/1
1 POWDER NASAL ONCE
Refills: 0 | Status: DISCONTINUED | OUTPATIENT
Start: 2025-06-28 | End: 2025-07-01

## 2025-06-28 RX ORDER — ACETAMINOPHEN 500 MG/5ML
1000 LIQUID (ML) ORAL ONCE
Refills: 0 | Status: COMPLETED | OUTPATIENT
Start: 2025-06-28 | End: 2025-06-28

## 2025-06-28 RX ORDER — CEFAZOLIN SODIUM IN 0.9 % NACL 3 G/100 ML
2000 INTRAVENOUS SOLUTION, PIGGYBACK (ML) INTRAVENOUS EVERY 8 HOURS
Refills: 0 | Status: DISCONTINUED | OUTPATIENT
Start: 2025-06-28 | End: 2025-07-01

## 2025-06-28 RX ORDER — DIPHENHYDRAMINE HCL 12.5MG/5ML
25 ELIXIR ORAL ONCE
Refills: 0 | Status: COMPLETED | OUTPATIENT
Start: 2025-06-28 | End: 2025-06-28

## 2025-06-28 RX ORDER — ACETAMINOPHEN 500 MG/5ML
1000 LIQUID (ML) ORAL EVERY 6 HOURS
Refills: 0 | Status: DISCONTINUED | OUTPATIENT
Start: 2025-06-28 | End: 2025-06-29

## 2025-06-28 RX ORDER — SODIUM CHLORIDE 9 G/1000ML
1000 INJECTION, SOLUTION INTRAVENOUS
Refills: 0 | Status: DISCONTINUED | OUTPATIENT
Start: 2025-06-28 | End: 2025-07-01

## 2025-06-28 RX ORDER — DEXTROSE 50 % IN WATER 50 %
25 SYRINGE (ML) INTRAVENOUS ONCE
Refills: 0 | Status: DISCONTINUED | OUTPATIENT
Start: 2025-06-28 | End: 2025-07-01

## 2025-06-28 RX ORDER — INSULIN LISPRO 100 U/ML
INJECTION, SOLUTION INTRAVENOUS; SUBCUTANEOUS AT BEDTIME
Refills: 0 | Status: DISCONTINUED | OUTPATIENT
Start: 2025-06-28 | End: 2025-07-01

## 2025-06-28 RX ORDER — DEXTROSE 50 % IN WATER 50 %
12.5 SYRINGE (ML) INTRAVENOUS ONCE
Refills: 0 | Status: DISCONTINUED | OUTPATIENT
Start: 2025-06-28 | End: 2025-07-01

## 2025-06-28 RX ORDER — OXYCODONE HYDROCHLORIDE 30 MG/1
10 TABLET ORAL EVERY 4 HOURS
Refills: 0 | Status: DISCONTINUED | OUTPATIENT
Start: 2025-06-28 | End: 2025-07-01

## 2025-06-28 RX ORDER — MAGNESIUM SULFATE 500 MG/ML
2 SYRINGE (ML) INJECTION ONCE
Refills: 0 | Status: COMPLETED | OUTPATIENT
Start: 2025-06-28 | End: 2025-06-28

## 2025-06-28 RX ORDER — DEXTROSE 50 % IN WATER 50 %
15 SYRINGE (ML) INTRAVENOUS ONCE
Refills: 0 | Status: DISCONTINUED | OUTPATIENT
Start: 2025-06-28 | End: 2025-07-01

## 2025-06-28 RX ORDER — ACETAMINOPHEN 500 MG/5ML
650 LIQUID (ML) ORAL EVERY 6 HOURS
Refills: 0 | Status: DISCONTINUED | OUTPATIENT
Start: 2025-06-28 | End: 2025-06-28

## 2025-06-28 RX ORDER — OXYCODONE HYDROCHLORIDE 30 MG/1
5 TABLET ORAL EVERY 4 HOURS
Refills: 0 | Status: DISCONTINUED | OUTPATIENT
Start: 2025-06-28 | End: 2025-07-01

## 2025-06-28 RX ORDER — INSULIN LISPRO 100 U/ML
INJECTION, SOLUTION INTRAVENOUS; SUBCUTANEOUS
Refills: 0 | Status: DISCONTINUED | OUTPATIENT
Start: 2025-06-28 | End: 2025-07-01

## 2025-06-28 RX ADMIN — Medication 400 MILLIGRAM(S): at 21:30

## 2025-06-28 RX ADMIN — Medication 1000 MILLIGRAM(S): at 15:04

## 2025-06-28 RX ADMIN — Medication 1000 MILLILITER(S): at 02:20

## 2025-06-28 RX ADMIN — Medication 100 MILLIGRAM(S): at 22:34

## 2025-06-28 RX ADMIN — Medication 125 MILLILITER(S): at 02:20

## 2025-06-28 RX ADMIN — Medication 25 GRAM(S): at 19:32

## 2025-06-28 RX ADMIN — Medication 25 MILLIGRAM(S): at 22:34

## 2025-06-28 RX ADMIN — SODIUM CHLORIDE 4000 MILLILITER(S): 9 INJECTION, SOLUTION INTRAVENOUS at 00:35

## 2025-06-28 RX ADMIN — Medication 500 MILLILITER(S): at 07:48

## 2025-06-28 RX ADMIN — Medication 100 MILLIGRAM(S): at 05:25

## 2025-06-28 RX ADMIN — Medication 400 MILLIGRAM(S): at 01:07

## 2025-06-28 RX ADMIN — Medication 125 MILLILITER(S): at 07:48

## 2025-06-28 RX ADMIN — INSULIN LISPRO 6: 100 INJECTION, SOLUTION INTRAVENOUS; SUBCUTANEOUS at 07:45

## 2025-06-28 RX ADMIN — Medication 400 MILLIGRAM(S): at 14:03

## 2025-06-28 RX ADMIN — Medication 100 MILLIGRAM(S): at 14:02

## 2025-06-28 RX ADMIN — Medication 1000 MILLIGRAM(S): at 22:30

## 2025-06-28 RX ADMIN — INSULIN LISPRO 2: 100 INJECTION, SOLUTION INTRAVENOUS; SUBCUTANEOUS at 13:01

## 2025-06-28 NOTE — PROVIDER CONTACT NOTE (OTHER) - BACKGROUND
Patient s/p panniculectomy 6/26 at [facility], post-operative patient was hypotensive and had episodes of syncope. Patient was brought to [facility] ED, then [facility] by ED STEVEN Ruth.

## 2025-06-28 NOTE — PROVIDER CONTACT NOTE (OTHER) - ACTION/TREATMENT ORDERED:
DO Zohaib at bedside, evaluated patient's surgical incisions and abdomen and pending orders from surgical team.

## 2025-06-28 NOTE — ED PROVIDER NOTE - NS ED ROS FT
CONSTITUTIONAL: No fever, no chills, no fatigue, + lightheadedness, + syncope  EYES: No eye redness, no visual changes  ENT: No ear pain, no sore throat  CARDIOVASCULAR: No chest pain, no palpitations  RESPIRATORY: No cough, no SOB  GI: + lower abdominal pain, no nausea, no vomiting, no constipation, no diarrhea  GENITOURINARY: No dysuria, no frequency, no hematuria  MUSCULOSKELETAL: No back pain, no joint pain, no myalgias  SKIN: No rash, no peripheral edema  NEURO: No headache, no confusion    ALL OTHER SYSTEMS NEGATIVE.

## 2025-06-28 NOTE — PROVIDER CONTACT NOTE (OTHER) - BACKGROUND
Patient was hypotensive s/p panniculectomy, with episodes of syncope. Hypotensive in ED, and brought up to [facility] by STEVEN Ruth.

## 2025-06-28 NOTE — ED PROVIDER NOTE - PHYSICAL EXAMINATION
CONSTITUTIONAL: Non-toxic; drowsy  HEAD: Normocephalic; atraumatic  EYES: PERRL; EOM intact   ENMT: External appears normal  NECK: Supple; non-tender  CARD: Normal S1, S2; no murmurs, rubs, or gallops  RESP: Normal chest excursion with respiration; breath sounds clear and equal bilaterally  ABD: Soft, non-distended; + suprapubic surgical incision c/d/i, mild ttp of lower abd, no guarding, no rebound, no CVAT  EXT: Normal ROM in all four extremities; non-tender to palpation, no peripheral edema  SKIN: Warm, dry, no rash  NEURO:  No focal neurological deficiencies

## 2025-06-28 NOTE — ED ADULT NURSE NOTE - CHIEF COMPLAINT QUOTE
Pt BIBEMS as transfer from [facility] c/o syncopal episode, 2x accompanied with hypotension, s/p Panniculectomy at 4pm today. Also reports abdominal pain. At triage, AOx3, appears pale.

## 2025-06-28 NOTE — PROVIDER CONTACT NOTE (OTHER) - SITUATION
Pre-syncope, pt is diaphoretic after standing up w/ MD Hannah but responsive and answering questions Pre-syncope, pt is diaphoretic after standing up w/ MD Hannah but responsive and answering questions, pt felt lightheaded, rapid response called as per MD request

## 2025-06-28 NOTE — PROVIDER CONTACT NOTE (OTHER) - REASON
Pre-syncope, pt is diaphoretic after standing up w/ MD Hannah but responsive and answering questions Pre-syncope, pt is diaphoretic after standing up w/ MD Hannah but responsive and answering questions, pt felt lightheaded

## 2025-06-28 NOTE — PROVIDER CONTACT NOTE (OTHER) - ACTION/TREATMENT ORDERED:
Rapid response activated, labs drawn, EKG done, 1 unit PRBC's started, IV placed. Repeat CBC ordered after PRBC infusion.

## 2025-06-28 NOTE — ED ADULT TRIAGE NOTE - BEFAST BALANCE
Chief Complaint   Patient presents with     Ear Problem     X 1 week      SUBJECTIVE:  Santos Paulino is a 13 month old male who presents with his mother for evaluation of bilateral ear pain, tugging, crabby, felt hot for 1 week(s).   Severity: moderate   Timing: gradual onset and worsening  Treatment measures tried include: Tylenol/Ibuprofen.  History of recurrent otitis: yes just treated with omnicef .  Predisposing factors include: already discussing t tubes with ENT.    No past medical history on file.  acetaminophen (TYLENOL) 32 mg/mL liquid, Take 15 mg/kg by mouth every 4 hours as needed for fever or mild pain  cetirizine (ZYRTEC) 5 MG/5ML solution, Take 5 mg by mouth daily  EPINEPHrine (AUVI-Q) 0.15 MG/0.15ML injection 2-pack, Inject 0.15 mLs (0.15 mg) into the muscle as needed for anaphylaxis  hydrocortisone 2.5 % cream, Apply topically 2 times daily  Pediatric Multivitamins-Fl (MULTI-VIT/FLUORIDE) 0.25 MG/ML SOLN solution, Take 1 mL by mouth daily  [] cefdinir (OMNICEF) 250 MG/5ML suspension, Take 3 mLs (150 mg) by mouth daily for 10 days  diphenhydrAMINE (BENADRYL) 12.5 MG/5ML solution, Take by mouth 4 times daily as needed for allergies or sleep    No current facility-administered medications on file prior to visit.     Social History     Tobacco Use     Smoking status: Never Smoker     Smokeless tobacco: Never Used   Substance Use Topics     Alcohol use: Never     Frequency: Never     Allergies   Allergen Reactions     Peanut Butter Flavor      Peanuts [Nuts]      hives     Review of Systems   Constitutional: Positive for crying, fever (felt hot) and irritability. Negative for appetite change, chills, diaphoresis and fatigue.   HENT: Positive for congestion and ear pain. Negative for ear discharge, mouth sores, rhinorrhea, sore throat and trouble swallowing.    Respiratory: Negative for apnea, cough, choking, wheezing and stridor.    Gastrointestinal: Negative for nausea and vomiting.    Musculoskeletal: Negative for myalgias.   Skin: Negative for rash.   Neurological: Negative for headaches.   Hematological: Negative for adenopathy.   Psychiatric/Behavioral: Positive for sleep disturbance.     OBJECTIVE:  Temp 98.3  F (36.8  C) (Temporal)   Wt 10.9 kg (24 lb)      Physical Exam  Vitals signs reviewed.   Constitutional:       General: He is active. He is not in acute distress.     Appearance: He is not toxic-appearing.   HENT:      Head: Normocephalic and atraumatic.      Right Ear: Tympanic membrane is erythematous and bulging.      Left Ear: Tympanic membrane is erythematous and bulging.      Nose: Congestion and rhinorrhea present.   Neck:      Musculoskeletal: Normal range of motion and neck supple.   Cardiovascular:      Rate and Rhythm: Normal rate.   Pulmonary:      Effort: Pulmonary effort is normal. No nasal flaring or retractions.      Breath sounds: No stridor. No wheezing, rhonchi or rales.   Musculoskeletal: Normal range of motion.   Lymphadenopathy:      Cervical: No cervical adenopathy.   Skin:     General: Skin is warm and dry.   Neurological:      General: No focal deficit present.      Mental Status: He is alert and oriented for age.       ASSESSMENT:    ICD-10-CM    1. Other recurrent acute nonsuppurative otitis media of both ears H65.196 cefdinir (OMNICEF) 250 MG/5ML suspension     PLAN:   Patient Instructions   Omnicef for double ear infection.  Tylenol and/or motrin for pain relief and fever reduction.  Warm compresses next to ear for pain relief.  Drink plenty of fluids and place a humidifier in bedroom.  Ear infections are not contagious.  Swimming is ok as long as there is no perforation in the ear drum.   Follow up with primary care provider 10-14 days after starting the antibiotic with any concern of persistent infection.    Follow up with primary care provider with any problems, questions or concerns or if symptoms worsen or fail to improve. Patient agreed to plan and  verbalized understanding.    Kristi Galaviz, FNP-[facility]   No

## 2025-06-28 NOTE — RAPID RESPONSE TEAM SUMMARY - NSOTHERINTERVENTIONSRRT_GEN_ALL_CORE
Recommendations:  #Presyncope   #Hypotension  Pt has been NPO since surgery on 6/27, however, has received 3.5L of IVF and mIVF at 150cc/hr x8hrs thus far. Pt had a panniculectomy on 6/27 with some blood loss intra-op. Had CTAP which showed s/p panniculectomy with abundant mixed fluid and blood products in lower ventral abdominal wall (dominant hematoma approximately 1 L on the left), drains course through the body wall fluid/hematoma. No evidence of brisk bleeding.  Spoke to Dr. Hannah at bedside, which believes the CT findings are more c/w hematoma and not an active bleed. MORALES drain output is not consistent with active bleed. L MORALES total output thus far is 73cc. R MORALES drain total output thus far is 25cc.  Pt's diaphoresis has resolved, repeat /73 and again 158/69.     Plan:  - Give 1U pRBC stat  - Obtain CBC, CMP, Mg, Phos, T&S and lactate (Hb 8 -> 7.7)  - Monitor BP  - Monitor MORALES drain output   - Give Mg 4g IV x1 and KCl 10mEq IV x2    Case discussed with Recommendations:  #Presyncope   #Hypotension  Pt has been NPO since surgery on 6/27, however, has received 3.5L of IVF and mIVF at 150cc/hr x8hrs thus far. Pt had a panniculectomy on 6/27 with some blood loss intra-op. Had CTAP which showed s/p panniculectomy with abundant mixed fluid and blood products in lower ventral abdominal wall (dominant hematoma approximately 1 L on the left), drains course through the body wall fluid/hematoma. No evidence of brisk bleeding.  Spoke to Dr. Hannah at bedside, which believes the CT findings are more c/w hematoma and not an active bleed. MORALES drain output is not consistent with active bleed. L MORALES total output thus far is 73cc. R MORALES drain total output thus far is 25cc.  Pt's diaphoresis has resolved, repeat /73 and again 158/69.     Plan:  - Give 1U pRBC stat  - Obtain CBC, CMP, Mg, Phos, T&S and lactate (Hb 8 -> 7.7)  - Monitor BP  - Monitor MORALES drain output   - Give Mg 2g IV x1 and KCl 10mEq IV x2    Case discussed with Recommendations:  #Presyncope   #Hypotension   #Orthostatic hypotension   Pt has been NPO since surgery on 6/27, however, has received 3.5L of IVF and mIVF at 150cc/hr x8hrs thus far. Pt had a panniculectomy on 6/27 with some blood loss intra-op. Had CTAP which showed s/p panniculectomy with abundant mixed fluid and blood products in lower ventral abdominal wall (dominant hematoma approximately 1 L on the left), drains course through the body wall fluid/hematoma. No evidence of brisk bleeding.  Spoke to Dr. Hannah at bedside, which believes the CT findings are more c/w hematoma and not an active bleed. MORALES drain output is not consistent with active bleed. L MORALES total output thus far is 73cc. R MORALES drain total output thus far is 25cc.  Pt's diaphoresis has resolved, repeat /73 and again 158/69.   Obtained CBC, CMP, coags, lactate -> Hb 7.7 from 8, lactate 1.9, Mg 1.5, K 3.8    Plan:  - Give 1U pRBC stat  - Monitor BP  - Monitor MORALES drain output   - Obtain 6PM CBC  - Give Mg 2g IV x1 and KCl 10mEq IV x2    Case discussed with Dr. Schaefer

## 2025-06-28 NOTE — RAPID RESPONSE TEAM SUMMARY - NSSITUATIONBACKGROUNDRRT_GEN_ALL_CORE
HPI: 59-year-old female with T2DM, presented from [facility] on 6/27PM s/p syncopal episode iso hypotension likely 2/2 downtrending Hb (13.9 -> 10.1 -> 9.2) in PACU s/p panniculectomy on 6/27, s/p 2L of IVF in PACU. Admitted to [facility] for further monitoring. CTAP with contrast was obtained which showed s/p panniculectomy with abundant mixed fluid and blood products in lower ventral abdominal wall (dominant hematoma approximately 1 L on the left), drains course through the body wall fluid/hematoma. No evidence of brisk bleeding. Overnight noted, to have average BP of 80s/50s with lactate 2.6 -> 2.1. S/p 1.5L of IVF overnight with mIVF at 150cc/hr. On 6/28AM, noted to have Hb 8.   Rapid response reason: Hypotension to 101/44, diaphoretic and pre-syncope after standing up  Brief initial assessment: Pt appears diaphoretic, comfortable.

## 2025-06-28 NOTE — PROVIDER CONTACT NOTE (OTHER) - BACKGROUND
s/p paniculectomy on 6/27 s/p paniculectomy on 6/27, pt has been hypotensive throughout hospital stay.

## 2025-06-28 NOTE — PROVIDER CONTACT NOTE (OTHER) - ACTION/TREATMENT ORDERED:
MD Vogel notified, MD stated she will redraw labs via ultrasound. MD Vogel attempted to draw labs but unable to. DO Penny from plastics notified that pt needs ultrasound to draw labs, DO Penny stated he is not concerned and doesn't need repeat labs at this time.

## 2025-06-28 NOTE — H&P ADULT - HISTORY OF PRESENT ILLNESS
Plastic Surgery  (pg [facility]: [id number], NS: [phone])    HPI:  60 yo F w PMH of DM2, from [facility] s/p panniculectomy today, had syncopal episdoes in PACU, downtrending H/H. Pt was increasingly hypotensive. [facility] Called Dr. Hannah who had pt transfered. In ED, BP began to normalized. CT obtained which is pending. H/H currently stable from latest at [facility].    PAST MEDICAL & SURGICAL HISTORY:  Type 2 diabetes mellitus  2013      Hyperlipidemia      Intraductal cancer  Left breast s/p lumpectomy and adjuvant radiation      H/O carpal tunnel syndrome      Vitamin D deficiency      Osteopenia      Thyroid nodule       novel coronavirus disease (COVID-19)  2020- mild symptoms, did not require treatment      H/O thyroid nodule      History of   ,,      H/O tubal ligation  2013      H/O lumpectomy  2018, left breast      History of carpal tunnel release  Bilateral        Allergies    No Known Allergies    Intolerances      Home Medications:  anastrozole 1 mg oral tablet: 1 tab(s) orally once a day (at bedtime) (2025 11:30)  atorvastatin 10 mg oral tablet: 1 tab(s) orally once a day (at bedtime) (2025 11:30)  Flonase 50 mcg/inh nasal spray: 1 spray(s) nasal once a day (2025 11:30)  metFORMIN 1000 mg oral tablet: orally 2 times a day (2025 11:30)  Mounjaro 7.5 mg/0.5 mL subcutaneous solution: 7.5 milligram(s) subcutaneously (2025 11:30)  Vitamin D3 25 mcg (1000 intl units) oral tablet: 1 tab(s) orally once a day (2025 11:30)    MEDICATIONS  (STANDING):  ceFAZolin   IVPB 2000 milliGRAM(s) IV Intermittent every 8 hours  dextrose 5%. 1000 milliLiter(s) (50 mL/Hr) IV Continuous <Continuous>  dextrose 5%. 1000 milliLiter(s) (100 mL/Hr) IV Continuous <Continuous>  dextrose 50% Injectable 25 Gram(s) IV Push once  dextrose 50% Injectable 12.5 Gram(s) IV Push once  dextrose 50% Injectable 25 Gram(s) IV Push once  glucagon  Injectable 1 milliGRAM(s) IntraMuscular once  insulin lispro (ADMELOG) corrective regimen sliding scale   SubCutaneous three times a day before meals  insulin lispro (ADMELOG) corrective regimen sliding scale   SubCutaneous at bedtime  sodium chloride 0.9% Bolus 1000 milliLiter(s) IV Bolus once  sodium chloride 0.9%. 1000 milliLiter(s) (125 mL/Hr) IV Continuous <Continuous>      SOCIAL HISTORY:  FAMILY HISTORY:  FH: stroke (Mother)    FH: type 1 diabetes (Mother)        ___________________________________________  OBJECTIVE:  Vital Signs Last 24 Hrs  T(C): 36.4 (2025 00:52), Max: 36.7 (2025 11:33)  T(F): 97.6 (2025 00:52), Max: 98.1 (2025 11:33)  HR: 69 (2025 00:52) (50 - 97)  BP: 139/65 (2025 00:52) (75/44 - 163/83)  BP(mean): --  RR: 20 (2025 00:52) (10 - 20)  SpO2: 98% (2025 00:52) (94% - 100%)    Parameters below as of 2025 00:52  Patient On (Oxygen Delivery Method): room air    CAPILLARY BLOOD GLUCOSE      POCT Blood Glucose.: 320 mg/dL (2025 22:55)    I&O's Detail    General: Well developed, well nourished, NAD  Neuro: Alert and oriented, no focal deficits, moves all extremities spontaneously    Abdomen: TTP, MORALES with some blood    ____________________________________________  LABS:  CBC Full  -  ( 2025 23:46 )  WBC Count : 18.85 K/uL  RBC Count : 3.17 M/uL  Hemoglobin : 9.2 g/dL  Hematocrit : 28.3 %  Platelet Count - Automated : 160 K/uL  Mean Cell Volume : 89.3 fl  Mean Cell Hemoglobin : 29.0 pg  Mean Cell Hemoglobin Concentration : 32.5 g/dL  Auto Neutrophil # : 16.34 K/uL  Auto Lymphocyte # : 1.12 K/uL  Auto Monocyte # : 1.30 K/uL  Auto Eosinophil # : 0.00 K/uL  Auto Basophil # : 0.02 K/uL  Auto Neutrophil % : 86.7 %  Auto Lymphocyte % : 5.9 %  Auto Monocyte % : 6.9 %  Auto Eosinophil % : 0.0 %  Auto Basophil % : 0.1 %    06-27    134[L]  |  104  |  10  ----------------------------<  329[H]  4.3   |  23  |  0.67    Ca    7.3[L]      2025 23:46        PT/INR - ( 2025 23:57 )   PT: 14.4 sec;   INR: 1.25          PTT - ( 2025 23:57 )  PTT:25.9 sec  Urinalysis Basic - ( 2025 00:19 )    Color: Yellow / Appearance: Clear / SG: >1.030 / pH: x  Gluc: x / Ketone: x  / Bili: Negative / Urobili: 0.2 mg/dL   Blood: x / Protein: Negative mg/dL / Nitrite: Negative   Leuk Esterase: Negative / RBC: x / WBC x   Sq Epi: x / Non Sq Epi: x / Bacteria: x            ____________________________________________  MICRO:  RECENT CULTURES:    ____________________________________________  RADIOLOGY:

## 2025-06-28 NOTE — ED ADULT TRIAGE NOTE - CHIEF COMPLAINT QUOTE
Pt BIBEMS as transfer from [facility] c/o syncopal episode, 2x accompanied with hypotension, s/p Panniculectomy at 4pm today. Also reports abdominal pain. At triage, AOx3, appears pale.

## 2025-06-28 NOTE — ED PROVIDER NOTE - CLINICAL SUMMARY MEDICAL DECISION MAKING FREE TEXT BOX
Pt hypotensive post-op panniculectomy today. Syncope x1. Downtrending Hg (13.1 pre-op->10.9->9.5). S/p 2L IVF in OR and 2L IVF in PACU.  Pt arrived with persistent hypotension in ambulance, confirmed in ED.  Code fusion called on arrival to ED, later canceled.  Pts BP responded to IVF bolus in ED. Hg 9.2 in ED.  CTA performed with no obvious signs of internal bleeding.  Suspect hypotension 2/2 severe dehydration. Will admit for overnight monitoring and continued IVF resuscitation.

## 2025-06-28 NOTE — H&P ADULT - ASSESSMENT
60 yo F w PMH of DM2, from [facility] s/p panniculectomy today, had syncopal episdoes in PACU, downtrending H/H. Pt was increasingly hypotensive. [facility] Called Dr. Hannah who had pt transfered. In ED, BP began to normalized. CT obtained which is pending. H/H currently stable from latest at [facility].    Plan  - Dr Hannah at bedside saw pt, admit to his service  - AM cbc  - fu CTA read  - SCD  - flexed positioning  - DM control, endocrine consult  - NPO for now  - IVF  - ancef  - IS  - lovenox in AM?pending vitals    Eliseo Fontenot MD  Plastic and Reconstructive Surgery, PGY4  Available on [employer] Teams  KAT: [id number], NS: [phone] [facility]: Green Team [id number] [facility]: [id number]

## 2025-06-28 NOTE — PROVIDER CONTACT NOTE (OTHER) - ACTION/TREATMENT ORDERED:
MD Fontenot made aware of VS, instructed to recheck at 06:00, patient asymptomatic at this time. No new orders.

## 2025-06-28 NOTE — PROGRESS NOTE ADULT - SUBJECTIVE AND OBJECTIVE BOX
Patient underwent panniculectomy at [facility] yesterday and postop had low BP and dropping Hb/Hct therefore transferred to [facility] ED and admitted overnight. This morning her BP is in 80s. She has had multiple boluses of fluid, so far on the floor approx 1800 cc. Hb this AM is 8.0  On exam, abdomen feels soft, some discomfort in the suprapubic area and on the outside quadrants. Drain output is 50 cc from the left side and 15 cc from the right side.

## 2025-06-28 NOTE — ED ADULT NURSE NOTE - NSSEPSISCRITERIAMET_ED_A_ED
Refill request is for a maintenance medication and has met the criteria specified in the Ambulatory Medication Refill Standing Order for eligibility, visits, laboratory, alerts and was sent to the requested pharmacy.     Requested Prescriptions     Signed P Abnormal VS & WBC/Abnormal Lactate

## 2025-06-28 NOTE — PROVIDER CONTACT NOTE (OTHER) - BACKGROUND
Patient s/p panniculectomy , following procedure patient had episodes of syncope and was hypotensive. Brought to [facility] by ED STEVEN Ruth at 02:00. Patient has denied any chest pain, SOB/.

## 2025-06-28 NOTE — ED PROVIDER NOTE - OBJECTIVE STATEMENT
58 yo F w PMH of DM2, BIBEMS from [facility] s/p panniculectomy today, subsequent LOC, downtrending H/H. Pt was persistently hypotensive in ambulance (60's/30's) en route to ED. She had reading of 60/palp in ED. Code fusion called for c/f internal bleeding. She has lower abd pain, MORALES x2 w 10cc blood. No abd or flank bruising.

## 2025-06-28 NOTE — PROVIDER CONTACT NOTE (OTHER) - BACKGROUND
Patient s/p panniculectomy 6/27, received from ED RN Faraz around 02:00 to [facility]. Presented to ED due to post operative syncope episodes and hypotension.

## 2025-06-28 NOTE — RAPID RESPONSE TEAM SUMMARY - NSADDTLFINDINGSRRT_GEN_ALL_CORE
Initial vital signs: T98.8F, HR 70, /41, RR 20, SpO2 100% on 2L NC  ROS: + diaphoresis, abdominal soreness   PE: AAOx3, face diaphoresis, comfortable, CTA b/l, RRR, abdomen with abdominal brace noted, 2JP drains. R MORALES drain with 5cc and L MORALES drain with 20cc of SS fluid, abdomen tender but soft, hypoactive BS. No LE edema. Bilateral LE with SCD's in place. Suarez in place with clear, nonbloody urine. Extremities warm. Muscle strength and sensation of UE and LE intact. No focal deficits.   EKG: NSR at 71bpm, QTc 441, no acute ischemic changes noted  FS

## 2025-06-29 LAB
ANION GAP SERPL CALC-SCNC: 4 MMOL/L — LOW (ref 5–17)
APPEARANCE UR: CLEAR — SIGNIFICANT CHANGE UP
BILIRUB UR-MCNC: NEGATIVE — SIGNIFICANT CHANGE UP
BLD GP AB SCN SERPL QL: NEGATIVE — SIGNIFICANT CHANGE UP
BUN SERPL-MCNC: 11 MG/DL — SIGNIFICANT CHANGE UP (ref 7–23)
CALCIUM SERPL-MCNC: 7.5 MG/DL — LOW (ref 8.4–10.5)
CHLORIDE SERPL-SCNC: 111 MMOL/L — HIGH (ref 96–108)
CO2 SERPL-SCNC: 22 MMOL/L — SIGNIFICANT CHANGE UP (ref 22–31)
COLOR SPEC: YELLOW — SIGNIFICANT CHANGE UP
CREAT SERPL-MCNC: 0.69 MG/DL — SIGNIFICANT CHANGE UP (ref 0.5–1.3)
DIFF PNL FLD: NEGATIVE — SIGNIFICANT CHANGE UP
EGFR: 101 ML/MIN/1.73M2 — SIGNIFICANT CHANGE UP
EGFR: 101 ML/MIN/1.73M2 — SIGNIFICANT CHANGE UP
GLUCOSE SERPL-MCNC: 122 MG/DL — HIGH (ref 70–99)
GLUCOSE UR QL: NEGATIVE MG/DL — SIGNIFICANT CHANGE UP
HCT VFR BLD CALC: 23.7 % — LOW (ref 34.5–45)
HGB BLD-MCNC: 7.8 G/DL — LOW (ref 11.5–15.5)
KETONES UR QL: NEGATIVE MG/DL — SIGNIFICANT CHANGE UP
LEUKOCYTE ESTERASE UR-ACNC: NEGATIVE — SIGNIFICANT CHANGE UP
MCHC RBC-ENTMCNC: 29.8 PG — SIGNIFICANT CHANGE UP (ref 27–34)
MCHC RBC-ENTMCNC: 32.9 G/DL — SIGNIFICANT CHANGE UP (ref 32–36)
MCV RBC AUTO: 90.5 FL — SIGNIFICANT CHANGE UP (ref 80–100)
NITRITE UR-MCNC: NEGATIVE — SIGNIFICANT CHANGE UP
NRBC # BLD AUTO: 0 K/UL — SIGNIFICANT CHANGE UP (ref 0–0)
NRBC # FLD: 0 K/UL — SIGNIFICANT CHANGE UP (ref 0–0)
NRBC BLD AUTO-RTO: 0 /100 WBCS — SIGNIFICANT CHANGE UP (ref 0–0)
PH UR: 6 — SIGNIFICANT CHANGE UP (ref 5–8)
PLATELET # BLD AUTO: 130 K/UL — LOW (ref 150–400)
PMV BLD: 11.7 FL — SIGNIFICANT CHANGE UP (ref 7–13)
POTASSIUM SERPL-MCNC: 3.8 MMOL/L — SIGNIFICANT CHANGE UP (ref 3.5–5.3)
POTASSIUM SERPL-SCNC: 3.8 MMOL/L — SIGNIFICANT CHANGE UP (ref 3.5–5.3)
PROT UR-MCNC: NEGATIVE MG/DL — SIGNIFICANT CHANGE UP
RBC # BLD: 2.62 M/UL — LOW (ref 3.8–5.2)
RBC # FLD: 13.5 % — SIGNIFICANT CHANGE UP (ref 10.3–14.5)
RH IG SCN BLD-IMP: POSITIVE — SIGNIFICANT CHANGE UP
SODIUM SERPL-SCNC: 137 MMOL/L — SIGNIFICANT CHANGE UP (ref 135–145)
SP GR SPEC: 1.01 — SIGNIFICANT CHANGE UP (ref 1–1.03)
UROBILINOGEN FLD QL: 0.2 MG/DL — SIGNIFICANT CHANGE UP (ref 0.2–1)
WBC # BLD: 7.75 K/UL — SIGNIFICANT CHANGE UP (ref 3.8–10.5)
WBC # FLD AUTO: 7.75 K/UL — SIGNIFICANT CHANGE UP (ref 3.8–10.5)

## 2025-06-29 PROCEDURE — 83735 ASSAY OF MAGNESIUM: CPT

## 2025-06-29 PROCEDURE — 81003 URINALYSIS AUTO W/O SCOPE: CPT

## 2025-06-29 PROCEDURE — 83036 HEMOGLOBIN GLYCOSYLATED A1C: CPT

## 2025-06-29 PROCEDURE — 36415 COLL VENOUS BLD VENIPUNCTURE: CPT

## 2025-06-29 PROCEDURE — 85027 COMPLETE CBC AUTOMATED: CPT

## 2025-06-29 PROCEDURE — 85610 PROTHROMBIN TIME: CPT

## 2025-06-29 PROCEDURE — 84132 ASSAY OF SERUM POTASSIUM: CPT

## 2025-06-29 PROCEDURE — 82962 GLUCOSE BLOOD TEST: CPT

## 2025-06-29 PROCEDURE — 84100 ASSAY OF PHOSPHORUS: CPT

## 2025-06-29 PROCEDURE — 99221 1ST HOSP IP/OBS SF/LOW 40: CPT

## 2025-06-29 PROCEDURE — 83605 ASSAY OF LACTIC ACID: CPT

## 2025-06-29 PROCEDURE — 82803 BLOOD GASES ANY COMBINATION: CPT

## 2025-06-29 PROCEDURE — 82330 ASSAY OF CALCIUM: CPT

## 2025-06-29 PROCEDURE — 85730 THROMBOPLASTIN TIME PARTIAL: CPT

## 2025-06-29 PROCEDURE — 85025 COMPLETE CBC W/AUTO DIFF WBC: CPT

## 2025-06-29 PROCEDURE — 80048 BASIC METABOLIC PNL TOTAL CA: CPT

## 2025-06-29 PROCEDURE — 84295 ASSAY OF SERUM SODIUM: CPT

## 2025-06-29 RX ORDER — ACETAMINOPHEN 500 MG/5ML
975 LIQUID (ML) ORAL ONCE
Refills: 0 | Status: COMPLETED | OUTPATIENT
Start: 2025-06-29 | End: 2025-06-29

## 2025-06-29 RX ORDER — ENOXAPARIN SODIUM 100 MG/ML
40 INJECTION SUBCUTANEOUS EVERY 24 HOURS
Refills: 0 | Status: DISCONTINUED | OUTPATIENT
Start: 2025-06-29 | End: 2025-07-01

## 2025-06-29 RX ORDER — ACETAMINOPHEN 500 MG/5ML
975 LIQUID (ML) ORAL EVERY 6 HOURS
Refills: 0 | Status: DISCONTINUED | OUTPATIENT
Start: 2025-06-29 | End: 2025-07-01

## 2025-06-29 RX ADMIN — Medication 975 MILLIGRAM(S): at 11:31

## 2025-06-29 RX ADMIN — Medication 75 MILLILITER(S): at 22:06

## 2025-06-29 RX ADMIN — OXYCODONE HYDROCHLORIDE 5 MILLIGRAM(S): 30 TABLET ORAL at 23:00

## 2025-06-29 RX ADMIN — Medication 400 MILLIGRAM(S): at 02:57

## 2025-06-29 RX ADMIN — Medication 975 MILLIGRAM(S): at 15:48

## 2025-06-29 RX ADMIN — Medication 75 MILLILITER(S): at 10:38

## 2025-06-29 RX ADMIN — INSULIN LISPRO 2: 100 INJECTION, SOLUTION INTRAVENOUS; SUBCUTANEOUS at 17:35

## 2025-06-29 RX ADMIN — Medication 100 MILLIGRAM(S): at 22:06

## 2025-06-29 RX ADMIN — Medication 100 MILLIGRAM(S): at 14:18

## 2025-06-29 RX ADMIN — Medication 1000 MILLIGRAM(S): at 04:00

## 2025-06-29 RX ADMIN — Medication 975 MILLIGRAM(S): at 23:51

## 2025-06-29 RX ADMIN — Medication 100 MILLIGRAM(S): at 05:33

## 2025-06-29 RX ADMIN — ENOXAPARIN SODIUM 40 MILLIGRAM(S): 100 INJECTION SUBCUTANEOUS at 11:42

## 2025-06-29 RX ADMIN — Medication 975 MILLIGRAM(S): at 10:39

## 2025-06-29 RX ADMIN — OXYCODONE HYDROCHLORIDE 5 MILLIGRAM(S): 30 TABLET ORAL at 22:27

## 2025-06-29 NOTE — CONSULT NOTE ADULT - SUBJECTIVE AND OBJECTIVE BOX
HPI:  58 yo female with history of HLD and DM who underwent a panniculectomy  at [facility] and had a syncopal episode  in the PACU so transferred and admitted to surgical service. The syncopal episode occurred in the setting of hypotension per report. Suspected to be due to downtrending hgb 13.9 -> 10.1 -> 9.2. CTAP showed s/p panniculectomy with abundant mixed fluid and blood products in lower ventral abdominal wall (dominant hematoma approximately 1 L on the left), drains course through the body wall fluid/hematoma and no evidence of brisk bleeding. She received IV fluid resuscitation and 1 U PRBC with clearance of lactate. RRT Called  for pre-syncope/ hypotension to 101/44. Patient was diaphoretic and pre-syncopal after standing up. Team discussed CT findings with Dr. Hannah - CT findings consistent with hematoma and not active bleed. Planned to give 1 more unit of PRBC,  monitor MORALES drain output, and repeat CBC. During second transfusion she was noted to have a reaction, so it was discontinued.   Cardiology consult for a PVC on EKG. EKGs in chart reviewed - NSR, no ectopy. Tele reviewed - sinus tachycardia.   Patient seen at the bedside. Reports abdominal pain when examined but not when sitting still. Denies chest pain, sob. She reports she could hear her heart beat during the second transfusion but other than that no palpitations.     She takes atorvastatin at home for HLD. She has never seen a cardiologist or had an echocardiogram. She was prevoiusly heavier and would become sob with climbing 2 flights of stairs but can now go 2 - 3 without issue. She can walk 10 blocks without issue. She hasn't experienced chest pain while exerting herself.   She works as a teacher.        At the time of the 2nd unit of PRBC she       PAST MEDICAL & SURGICAL HISTORY:  Type 2 diabetes mellitus        Hyperlipidemia      Intraductal cancer  Left breast s/p lumpectomy and adjuvant radiation      H/O carpal tunnel syndrome      Vitamin D deficiency      Osteopenia      Thyroid nodule       novel coronavirus disease (COVID-19)  2020- mild symptoms, did not require treatment      H/O thyroid nodule      History of   ,,      H/O tubal ligation        H/O lumpectomy  2018, left breast      History of carpal tunnel release  Bilateral        Allergies    No Known Allergies    Intolerances      Home Medications:  anastrozole 1 mg oral tablet: 1 tab(s) orally once a day (at bedtime) (2025 11:30)  atorvastatin 10 mg oral tablet: 1 tab(s) orally once a day (at bedtime) (2025 11:30)  Flonase 50 mcg/inh nasal spray: 1 spray(s) nasal once a day (2025 11:30)  metFORMIN 1000 mg oral tablet: orally 2 times a day (2025 11:30)  Mounjaro 7.5 mg/0.5 mL subcutaneous solution: 7.5 milligram(s) subcutaneously (2025 11:30)  Vitamin D3 25 mcg (1000 intl units) oral tablet: 1 tab(s) orally once a day (2025 11:30)    MEDICATIONS  (STANDING):  ceFAZolin   IVPB 2000 milliGRAM(s) IV Intermittent every 8 hours  dextrose 5%. 1000 milliLiter(s) (50 mL/Hr) IV Continuous <Continuous>  dextrose 5%. 1000 milliLiter(s) (100 mL/Hr) IV Continuous <Continuous>  dextrose 50% Injectable 25 Gram(s) IV Push once  dextrose 50% Injectable 12.5 Gram(s) IV Push once  dextrose 50% Injectable 25 Gram(s) IV Push once  glucagon  Injectable 1 milliGRAM(s) IntraMuscular once  insulin lispro (ADMELOG) corrective regimen sliding scale   SubCutaneous three times a day before meals  insulin lispro (ADMELOG) corrective regimen sliding scale   SubCutaneous at bedtime  sodium chloride 0.9% Bolus 1000 milliLiter(s) IV Bolus once  sodium chloride 0.9%. 1000 milliLiter(s) (125 mL/Hr) IV Continuous <Continuous>      SOCIAL HISTORY:  FAMILY HISTORY:  FH: stroke (Mother)    FH: type 1 diabetes (Mother)        ___________________________________________  OBJECTIVE:  Vital Signs Last 24 Hrs  T(C): 36.4 (2025 00:52), Max: 36.7 (2025 11:33)  T(F): 97.6 (2025 00:52), Max: 98.1 (2025 11:33)  HR: 69 (2025 00:52) (50 - 97)  BP: 139/65 (2025 00:52) (75/44 - 163/83)  BP(mean): --  RR: 20 (2025 00:52) (10 - 20)  SpO2: 98% (2025 00:52) (94% - 100%)    Parameters below as of 2025 00:52  Patient On (Oxygen Delivery Method): room air    CAPILLARY BLOOD GLUCOSE      POCT Blood Glucose.: 320 mg/dL (2025 22:55)    I&O's Detail    General: Well developed, well nourished, NAD  Neuro: Alert and oriented, no focal deficits, moves all extremities spontaneously    Abdomen: TTP, MORALES with some blood    ____________________________________________  LABS:  CBC Full  -  ( 2025 23:46 )  WBC Count : 18.85 K/uL  RBC Count : 3.17 M/uL  Hemoglobin : 9.2 g/dL  Hematocrit : 28.3 %  Platelet Count - Automated : 160 K/uL  Mean Cell Volume : 89.3 fl  Mean Cell Hemoglobin : 29.0 pg  Mean Cell Hemoglobin Concentration : 32.5 g/dL  Auto Neutrophil # : 16.34 K/uL  Auto Lymphocyte # : 1.12 K/uL  Auto Monocyte # : 1.30 K/uL  Auto Eosinophil # : 0.00 K/uL  Auto Basophil # : 0.02 K/uL  Auto Neutrophil % : 86.7 %  Auto Lymphocyte % : 5.9 %  Auto Monocyte % : 6.9 %  Auto Eosinophil % : 0.0 %  Auto Basophil % : 0.1 %        134[L]  |  104  |  10  ----------------------------<  329[H]  4.3   |  23  |  0.67    Ca    7.3[L]      2025 23:46        PT/INR - ( 2025 23:57 )   PT: 14.4 sec;   INR: 1.25          PTT - ( 2025 23:57 )  PTT:25.9 sec  Urinalysis Basic - ( 2025 00:19 )    Color: Yellow / Appearance: Clear / SG: >1.030 / pH: x  Gluc: x / Ketone: x  / Bili: Negative / Urobili: 0.2 mg/dL   Blood: x / Protein: Negative mg/dL / Nitrite: Negative   Leuk Esterase: Negative / RBC: x / WBC x   Sq Epi: x / Non Sq Epi: x / Bacteria: x            ____________________________________________  MICRO:  RECENT CULTURES:    ____________________________________________  RADIOLOGY:   (2025 01:44)        Pt seen and examined at bedside, NAD, ROS s/f ?, otherwise remainder of ROS is unremarkable     ROS: Per HPI    PAST MEDICAL & SURGICAL HISTORY:  Type 2 diabetes mellitus        Hyperlipidemia      Intraductal cancer  Left breast s/p lumpectomy and adjuvant radiation      H/O carpal tunnel syndrome      Vitamin D deficiency      Osteopenia      Thyroid nodule      2019 novel coronavirus disease (COVID-19)  2020- mild symptoms, did not require treatment      H/O thyroid nodule      History of   ,,      H/O tubal ligation        H/O lumpectomy  2018, left breast      History of carpal tunnel release  Bilateral        SOCIAL HISTORY:  FAMILY HISTORY:  FH: stroke (Mother)    FH: type 1 diabetes (Mother)      ALLERGIES: 	  No Known Allergies          MEDICATIONS:  acetaminophen     Tablet .. 975 milliGRAM(s) Oral every 6 hours  ceFAZolin   IVPB 2000 milliGRAM(s) IV Intermittent every 8 hours  dextrose 5%. 1000 milliLiter(s) IV Continuous <Continuous>  dextrose 5%. 1000 milliLiter(s) IV Continuous <Continuous>  dextrose 50% Injectable 25 Gram(s) IV Push once  dextrose 50% Injectable 12.5 Gram(s) IV Push once  dextrose 50% Injectable 25 Gram(s) IV Push once  dextrose Oral Gel 15 Gram(s) Oral once PRN  enoxaparin Injectable 40 milliGRAM(s) SubCutaneous every 24 hours  glucagon  Injectable 1 milliGRAM(s) IntraMuscular once  insulin lispro (ADMELOG) corrective regimen sliding scale   SubCutaneous three times a day before meals  insulin lispro (ADMELOG) corrective regimen sliding scale   SubCutaneous at bedtime  oxyCODONE    IR 5 milliGRAM(s) Oral every 4 hours PRN  oxyCODONE    IR 10 milliGRAM(s) Oral every 4 hours PRN  sodium chloride 0.9%. 1000 milliLiter(s) IV Continuous <Continuous>      T(C): 37.7 (25 @ 14:39), Max: 38.1 (25 @ 10:47)  HR: 112 (25 @ 13:23) (68 - 113)  BP: 108/52 (25 @ 13:23) (91/50 - 108/87)  RR: 18 (25 @ 13:23) (16 - 18)  SpO2: 96% (25 @ 13:23) (92% - 100%)    25 @ 07:01  -  25 @ 07:00  --------------------------------------------------------  IN: 1800 mL / OUT: 1706 mL / NET: 94 mL    25 @ 07:  -  25 @ 15:12  --------------------------------------------------------  IN: 450 mL / OUT: 515 mL / NET: -65 mL        PHYSICAL EXAM:  NAD  tachycardic + LSB   + bounding carotids   abd tender  le wwp, no edema      I&O's Summary    2025 07:  -  2025 07:00  --------------------------------------------------------  IN: 1800 mL / OUT: 1706 mL / NET: 94 mL    2025 07:01  -  2025 15:12  --------------------------------------------------------  IN: 450 mL / OUT: 515 mL / NET: -65 mL        LABS:	 	                        7.8    7.75  )-----------( 130      ( 2025 06:41 )             23.7         137  |  111[H]  |  11  ----------------------------<  122[H]  3.8   |  22  |  0.69    Ca    7.5[L]      2025 06:41  Phos  4.3       Mg     1.5               proBNP:   Lipid Profile:   HgA1c:   TSH:     TELEMETRY: 	    ECG:  	  RADIOLOGY:   ECHO:  STRESS:  CATH:   HPI:  60 yo female with history of HLD, DM, vertebral artery stenosis, breast cancer (dx 2019, s/p RT and chemo completed 6 months ago) who underwent a panniculectomy  at [facility] and had a syncopal episode  in the PACU so transferred and admitted to surgical service. The syncopal episode occurred in the setting of hypotension per report. Suspected to be due to downtrending hgb 13.9 -> 10.1 -> 9.2. CTAP showed s/p panniculectomy with abundant mixed fluid and blood products in lower ventral abdominal wall (dominant hematoma approximately 1 L on the left), drains course through the body wall fluid/hematoma and no evidence of brisk bleeding. She received IV fluid resuscitation and 1 U PRBC with clearance of lactate. RRT Called  for pre-syncope/ hypotension to 101/44. Patient was diaphoretic and pre-syncopal after standing up. Team discussed CT findings with Dr. Hannah - CT findings consistent with hematoma and not active bleed. Planned to give 1 more unit of PRBC,  monitor MORALES drain output, and repeat CBC. During second transfusion she was noted to have a reaction, so it was discontinued.   Cardiology consult for a PVC on EKG. EKGs in chart reviewed - NSR, no ectopy. Tele reviewed - sinus tachycardia.   Patient seen at the bedside. Reports abdominal pain when examined but not when sitting still. Denies chest pain, sob. She reports she could hear her heart beat during the second transfusion but other than that no palpitations.     She takes atorvastatin at home for HLD. She has never seen a cardiologist or had an echocardiogram. She was prevoiusly heavier and would become sob with climbing 2 flights of stairs but can now go 2 - 3 without issue. She can walk 10 blocks without issue. She hasn't experienced chest pain while exerting herself.   She works as a teacher.        At the time of the 2nd unit of PRBC she       PAST MEDICAL & SURGICAL HISTORY:  Type 2 diabetes mellitus        Hyperlipidemia      Intraductal cancer  Left breast s/p lumpectomy and adjuvant radiation      H/O carpal tunnel syndrome      Vitamin D deficiency      Osteopenia      Thyroid nodule       novel coronavirus disease (COVID-19)  2020- mild symptoms, did not require treatment      H/O thyroid nodule      History of   ,,      H/O tubal ligation        H/O lumpectomy  2018, left breast      History of carpal tunnel release  Bilateral        Allergies    No Known Allergies    Intolerances      Home Medications:  anastrozole 1 mg oral tablet: 1 tab(s) orally once a day (at bedtime) (2025 11:30)  atorvastatin 10 mg oral tablet: 1 tab(s) orally once a day (at bedtime) (2025 11:30)  Flonase 50 mcg/inh nasal spray: 1 spray(s) nasal once a day (2025 11:30)  metFORMIN 1000 mg oral tablet: orally 2 times a day (2025 11:30)  Mounjaro 7.5 mg/0.5 mL subcutaneous solution: 7.5 milligram(s) subcutaneously (2025 11:30)  Vitamin D3 25 mcg (1000 intl units) oral tablet: 1 tab(s) orally once a day (2025 11:30)    MEDICATIONS  (STANDING):  ceFAZolin   IVPB 2000 milliGRAM(s) IV Intermittent every 8 hours  dextrose 5%. 1000 milliLiter(s) (50 mL/Hr) IV Continuous <Continuous>  dextrose 5%. 1000 milliLiter(s) (100 mL/Hr) IV Continuous <Continuous>  dextrose 50% Injectable 25 Gram(s) IV Push once  dextrose 50% Injectable 12.5 Gram(s) IV Push once  dextrose 50% Injectable 25 Gram(s) IV Push once  glucagon  Injectable 1 milliGRAM(s) IntraMuscular once  insulin lispro (ADMELOG) corrective regimen sliding scale   SubCutaneous three times a day before meals  insulin lispro (ADMELOG) corrective regimen sliding scale   SubCutaneous at bedtime  sodium chloride 0.9% Bolus 1000 milliLiter(s) IV Bolus once  sodium chloride 0.9%. 1000 milliLiter(s) (125 mL/Hr) IV Continuous <Continuous>      SOCIAL HISTORY:  FAMILY HISTORY:  FH: stroke (Mother)    FH: type 1 diabetes (Mother)        ___________________________________________  OBJECTIVE:  Vital Signs Last 24 Hrs  T(C): 36.4 (2025 00:52), Max: 36.7 (2025 11:33)  T(F): 97.6 (2025 00:52), Max: 98.1 (2025 11:33)  HR: 69 (2025 00:52) (50 - 97)  BP: 139/65 (2025 00:52) (75/44 - 163/83)  BP(mean): --  RR: 20 (2025 00:52) (10 - 20)  SpO2: 98% (2025 00:52) (94% - 100%)    Parameters below as of 2025 00:52  Patient On (Oxygen Delivery Method): room air    CAPILLARY BLOOD GLUCOSE      POCT Blood Glucose.: 320 mg/dL (2025 22:55)    I&O's Detail    General: Well developed, well nourished, NAD  Neuro: Alert and oriented, no focal deficits, moves all extremities spontaneously    Abdomen: TTP, MORALES with some blood    ____________________________________________  LABS:  CBC Full  -  ( 2025 23:46 )  WBC Count : 18.85 K/uL  RBC Count : 3.17 M/uL  Hemoglobin : 9.2 g/dL  Hematocrit : 28.3 %  Platelet Count - Automated : 160 K/uL  Mean Cell Volume : 89.3 fl  Mean Cell Hemoglobin : 29.0 pg  Mean Cell Hemoglobin Concentration : 32.5 g/dL  Auto Neutrophil # : 16.34 K/uL  Auto Lymphocyte # : 1.12 K/uL  Auto Monocyte # : 1.30 K/uL  Auto Eosinophil # : 0.00 K/uL  Auto Basophil # : 0.02 K/uL  Auto Neutrophil % : 86.7 %  Auto Lymphocyte % : 5.9 %  Auto Monocyte % : 6.9 %  Auto Eosinophil % : 0.0 %  Auto Basophil % : 0.1 %        134[L]  |  104  |  10  ----------------------------<  329[H]  4.3   |  23  |  0.67    Ca    7.3[L]      2025 23:46        PT/INR - ( 2025 23:57 )   PT: 14.4 sec;   INR: 1.25          PTT - ( 2025 23:57 )  PTT:25.9 sec  Urinalysis Basic - ( 2025 00:19 )    Color: Yellow / Appearance: Clear / SG: >1.030 / pH: x  Gluc: x / Ketone: x  / Bili: Negative / Urobili: 0.2 mg/dL   Blood: x / Protein: Negative mg/dL / Nitrite: Negative   Leuk Esterase: Negative / RBC: x / WBC x   Sq Epi: x / Non Sq Epi: x / Bacteria: x            ____________________________________________  MICRO:  RECENT CULTURES:    ____________________________________________  RADIOLOGY:   (2025 01:44)        Pt seen and examined at bedside, NAD, ROS s/f ?, otherwise remainder of ROS is unremarkable     ROS: Per HPI    PAST MEDICAL & SURGICAL HISTORY:  Type 2 diabetes mellitus        Hyperlipidemia      Intraductal cancer  Left breast s/p lumpectomy and adjuvant radiation      H/O carpal tunnel syndrome      Vitamin D deficiency      Osteopenia      Thyroid nodule      2019 novel coronavirus disease (COVID-19)  2020- mild symptoms, did not require treatment      H/O thyroid nodule      History of   ,,      H/O tubal ligation        H/O lumpectomy  2018, left breast      History of carpal tunnel release  Bilateral        SOCIAL HISTORY:  FAMILY HISTORY:  FH: stroke (Mother)    FH: type 1 diabetes (Mother)      ALLERGIES: 	  No Known Allergies          MEDICATIONS:  acetaminophen     Tablet .. 975 milliGRAM(s) Oral every 6 hours  ceFAZolin   IVPB 2000 milliGRAM(s) IV Intermittent every 8 hours  dextrose 5%. 1000 milliLiter(s) IV Continuous <Continuous>  dextrose 5%. 1000 milliLiter(s) IV Continuous <Continuous>  dextrose 50% Injectable 25 Gram(s) IV Push once  dextrose 50% Injectable 12.5 Gram(s) IV Push once  dextrose 50% Injectable 25 Gram(s) IV Push once  dextrose Oral Gel 15 Gram(s) Oral once PRN  enoxaparin Injectable 40 milliGRAM(s) SubCutaneous every 24 hours  glucagon  Injectable 1 milliGRAM(s) IntraMuscular once  insulin lispro (ADMELOG) corrective regimen sliding scale   SubCutaneous three times a day before meals  insulin lispro (ADMELOG) corrective regimen sliding scale   SubCutaneous at bedtime  oxyCODONE    IR 5 milliGRAM(s) Oral every 4 hours PRN  oxyCODONE    IR 10 milliGRAM(s) Oral every 4 hours PRN  sodium chloride 0.9%. 1000 milliLiter(s) IV Continuous <Continuous>      T(C): 37.7 (25 @ 14:39), Max: 38.1 (25 @ 10:47)  HR: 112 (25 @ 13:23) (68 - 113)  BP: 108/52 (25 @ 13:23) (91/50 - 108/87)  RR: 18 (25 @ 13:23) (16 - 18)  SpO2: 96% (25 @ 13:23) (92% - 100%)    25 @ 07:01  -  25 @ 07:00  --------------------------------------------------------  IN: 1800 mL / OUT: 1706 mL / NET: 94 mL    25 @ 07:01  -  25 @ 15:12  --------------------------------------------------------  IN: 450 mL / OUT: 515 mL / NET: -65 mL        PHYSICAL EXAM:  NAD  tachycardic + LSB   + bounding carotids   abd tender  le wwp, no edema      I&O's Summary    2025 07:01  -  2025 07:00  --------------------------------------------------------  IN: 1800 mL / OUT: 1706 mL / NET: 94 mL    2025 07:01  -  2025 15:12  --------------------------------------------------------  IN: 450 mL / OUT: 515 mL / NET: -65 mL        LABS:	 	                        7.8    7.75  )-----------( 130      ( 2025 06:41 )             23.7     06-    137  |  111[H]  |  11  ----------------------------<  122[H]  3.8   |  22  |  0.69    Ca    7.5[L]      2025 06:41  Phos  4.3     -28  Mg     1.5               proBNP:   Lipid Profile:   HgA1c:   TSH:     TELEMETRY: 	    ECG:  	  RADIOLOGY:   ECHO:  STRESS:  CATH:   HPI:  60 yo female with history of HLD, DM, vertebral artery stenosis, breast cancer (dx 2019, s/p RT and chemo completed 6 months ago) who underwent a panniculectomy  at [facility] and had a syncopal episode  in the PACU so transferred and admitted to surgical service. The syncopal episode occurred in the setting of hypotension per report. Suspected to be due to downtrending hgb 13.9 -> 10.1 -> 9.2. CTAP showed s/p panniculectomy with abundant mixed fluid and blood products in lower ventral abdominal wall (dominant hematoma approximately 1 L on the left), drains course through the body wall fluid/hematoma and no evidence of brisk bleeding. She received IV fluid resuscitation and 1 U PRBC with clearance of lactate. RRT Called  for pre-syncope/ hypotension to 101/44. Patient was diaphoretic and pre-syncopal after standing up. Team discussed CT findings with Dr. Hannah - CT findings consistent with hematoma and not active bleed. Planned to give 1 more unit of PRBC,  monitor MORALES drain output, and repeat CBC. During second transfusion she was noted to have a reaction, so it was discontinued.   Cardiology consult for a PVC on EKG. EKGs in chart reviewed - NSR, no ectopy. Tele reviewed - sinus tachycardia.   Patient seen at the bedside. Reports abdominal pain when examined but not when sitting still. Denies chest pain, sob. She reports she could hear her heart beat during the second transfusion but other than that no palpitations.     She takes atorvastatin at home for HLD. She has never seen a cardiologist or had an echocardiogram. She was prevoiusly heavier and would become sob with climbing 2 flights of stairs but can now go 2 - 3 without issue. She can walk 10 blocks without issue. She hasn't experienced chest pain while exerting herself.   She works as a teacher.        At the time of the 2nd unit of PRBC she       PAST MEDICAL & SURGICAL HISTORY:  Type 2 diabetes mellitus        Hyperlipidemia      Intraductal cancer  Left breast s/p lumpectomy and adjuvant radiation      H/O carpal tunnel syndrome      Vitamin D deficiency      Osteopenia      Thyroid nodule       novel coronavirus disease (COVID-19)  2020- mild symptoms, did not require treatment      H/O thyroid nodule      History of   ,,      H/O tubal ligation        H/O lumpectomy  2018, left breast      History of carpal tunnel release  Bilateral        Allergies    No Known Allergies    Intolerances      Home Medications:  anastrozole 1 mg oral tablet: 1 tab(s) orally once a day (at bedtime) (2025 11:30)  atorvastatin 10 mg oral tablet: 1 tab(s) orally once a day (at bedtime) (2025 11:30)  Flonase 50 mcg/inh nasal spray: 1 spray(s) nasal once a day (2025 11:30)  metFORMIN 1000 mg oral tablet: orally 2 times a day (2025 11:30)  Mounjaro 7.5 mg/0.5 mL subcutaneous solution: 7.5 milligram(s) subcutaneously (2025 11:30)  Vitamin D3 25 mcg (1000 intl units) oral tablet: 1 tab(s) orally once a day (2025 11:30)    MEDICATIONS  (STANDING):  ceFAZolin   IVPB 2000 milliGRAM(s) IV Intermittent every 8 hours  dextrose 5%. 1000 milliLiter(s) (50 mL/Hr) IV Continuous <Continuous>  dextrose 5%. 1000 milliLiter(s) (100 mL/Hr) IV Continuous <Continuous>  dextrose 50% Injectable 25 Gram(s) IV Push once  dextrose 50% Injectable 12.5 Gram(s) IV Push once  dextrose 50% Injectable 25 Gram(s) IV Push once  glucagon  Injectable 1 milliGRAM(s) IntraMuscular once  insulin lispro (ADMELOG) corrective regimen sliding scale   SubCutaneous three times a day before meals  insulin lispro (ADMELOG) corrective regimen sliding scale   SubCutaneous at bedtime  sodium chloride 0.9% Bolus 1000 milliLiter(s) IV Bolus once  sodium chloride 0.9%. 1000 milliLiter(s) (125 mL/Hr) IV Continuous <Continuous>      SOCIAL HISTORY:  FAMILY HISTORY:  FH: stroke (Mother)    FH: type 1 diabetes (Mother)        ___________________________________________  OBJECTIVE:  Vital Signs Last 24 Hrs  T(C): 36.4 (2025 00:52), Max: 36.7 (2025 11:33)  T(F): 97.6 (2025 00:52), Max: 98.1 (2025 11:33)  HR: 69 (2025 00:52) (50 - 97)  BP: 139/65 (2025 00:52) (75/44 - 163/83)  BP(mean): --  RR: 20 (2025 00:52) (10 - 20)  SpO2: 98% (2025 00:52) (94% - 100%)    Parameters below as of 2025 00:52  Patient On (Oxygen Delivery Method): room air    CAPILLARY BLOOD GLUCOSE      POCT Blood Glucose.: 320 mg/dL (2025 22:55)    I&O's Detail    General: Well developed, well nourished, NAD  Neuro: Alert and oriented, no focal deficits, moves all extremities spontaneously    Abdomen: TTP, MORALES with some blood    ____________________________________________  LABS:  CBC Full  -  ( 2025 23:46 )  WBC Count : 18.85 K/uL  RBC Count : 3.17 M/uL  Hemoglobin : 9.2 g/dL  Hematocrit : 28.3 %  Platelet Count - Automated : 160 K/uL  Mean Cell Volume : 89.3 fl  Mean Cell Hemoglobin : 29.0 pg  Mean Cell Hemoglobin Concentration : 32.5 g/dL  Auto Neutrophil # : 16.34 K/uL  Auto Lymphocyte # : 1.12 K/uL  Auto Monocyte # : 1.30 K/uL  Auto Eosinophil # : 0.00 K/uL  Auto Basophil # : 0.02 K/uL  Auto Neutrophil % : 86.7 %  Auto Lymphocyte % : 5.9 %  Auto Monocyte % : 6.9 %  Auto Eosinophil % : 0.0 %  Auto Basophil % : 0.1 %        134[L]  |  104  |  10  ----------------------------<  329[H]  4.3   |  23  |  0.67    Ca    7.3[L]      2025 23:46        PT/INR - ( 2025 23:57 )   PT: 14.4 sec;   INR: 1.25          PTT - ( 2025 23:57 )  PTT:25.9 sec  Urinalysis Basic - ( 2025 00:19 )    Color: Yellow / Appearance: Clear / SG: >1.030 / pH: x  Gluc: x / Ketone: x  / Bili: Negative / Urobili: 0.2 mg/dL   Blood: x / Protein: Negative mg/dL / Nitrite: Negative   Leuk Esterase: Negative / RBC: x / WBC x   Sq Epi: x / Non Sq Epi: x / Bacteria: x            ____________________________________________  MICRO:  RECENT CULTURES:    ____________________________________________  RADIOLOGY:   (2025 01:44)        Pt seen and examined at bedside, NAD, ROS s/f ?, otherwise remainder of ROS is unremarkable     ROS: Per HPI    PAST MEDICAL & SURGICAL HISTORY:  Type 2 diabetes mellitus        Hyperlipidemia      Intraductal cancer  Left breast s/p lumpectomy and adjuvant radiation      H/O carpal tunnel syndrome      Vitamin D deficiency      Osteopenia      Thyroid nodule      2019 novel coronavirus disease (COVID-19)  2020- mild symptoms, did not require treatment      H/O thyroid nodule      History of   ,,      H/O tubal ligation        H/O lumpectomy  2018, left breast      History of carpal tunnel release  Bilateral        SOCIAL HISTORY:  FAMILY HISTORY:  FH: stroke (Mother)    FH: type 1 diabetes (Mother)      ALLERGIES: 	  No Known Allergies          MEDICATIONS:  acetaminophen     Tablet .. 975 milliGRAM(s) Oral every 6 hours  ceFAZolin   IVPB 2000 milliGRAM(s) IV Intermittent every 8 hours  dextrose 5%. 1000 milliLiter(s) IV Continuous <Continuous>  dextrose 5%. 1000 milliLiter(s) IV Continuous <Continuous>  dextrose 50% Injectable 25 Gram(s) IV Push once  dextrose 50% Injectable 12.5 Gram(s) IV Push once  dextrose 50% Injectable 25 Gram(s) IV Push once  dextrose Oral Gel 15 Gram(s) Oral once PRN  enoxaparin Injectable 40 milliGRAM(s) SubCutaneous every 24 hours  glucagon  Injectable 1 milliGRAM(s) IntraMuscular once  insulin lispro (ADMELOG) corrective regimen sliding scale   SubCutaneous three times a day before meals  insulin lispro (ADMELOG) corrective regimen sliding scale   SubCutaneous at bedtime  oxyCODONE    IR 5 milliGRAM(s) Oral every 4 hours PRN  oxyCODONE    IR 10 milliGRAM(s) Oral every 4 hours PRN  sodium chloride 0.9%. 1000 milliLiter(s) IV Continuous <Continuous>      T(C): 37.7 (25 @ 14:39), Max: 38.1 (25 @ 10:47)  HR: 112 (25 @ 13:23) (68 - 113)  BP: 108/52 (25 @ 13:23) (91/50 - 108/87)  RR: 18 (25 @ 13:23) (16 - 18)  SpO2: 96% (25 @ 13:23) (92% - 100%)    25 @ 07:01  -  25 @ 07:00  --------------------------------------------------------  IN: 1800 mL / OUT: 1706 mL / NET: 94 mL    25 @ 07:01  -  25 @ 15:12  --------------------------------------------------------  IN: 450 mL / OUT: 515 mL / NET: -65 mL        PHYSICAL EXAM:  NAD  tachycardic + LSB   + bounding carotids   abd tender  le wwp, no edema      I&O's Summary    2025 07:01  -  2025 07:00  --------------------------------------------------------  IN: 1800 mL / OUT: 1706 mL / NET: 94 mL    2025 07:01  -  2025 15:12  --------------------------------------------------------  IN: 450 mL / OUT: 515 mL / NET: -65 mL        LABS:	 	                        7.8    7.75  )-----------( 130      ( 2025 06:41 )             23.7     06-29    137  |  111[H]  |  11  ----------------------------<  122[H]  3.8   |  22  |  0.69    Ca    7.5[L]      2025 06:41  Phos  4.3     -28  Mg     1.5

## 2025-06-29 NOTE — CONSULT NOTE ADULT - SUBJECTIVE AND OBJECTIVE BOX
NEUROLOGY CONSULT    HPI:    Patient is a 59 yr old female with PMH of DM2 and Peripheral Vertigo who is admitted to [facility] after 2 syncopal episodes i/s/o of hypotension S/P panniculectomy on . Neurology consulted for dizziness and BL tinnitus. Per chart review, Pt had 2 syncopal/presyncopal episodes after the precedure when she was walking to the the bathroom. Found to be hypotensive with downtreding Hgb 13.9 -> 10.1 -> 9.2. CTAP w/wo shows s/p panniculectomy with abundant mixed fluid and blood products in lower ventral abdominal wall (dominant hematoma approximately 1 L on the left), drains course through the body wall fluid/hematoma but no evidence of brisk bleeding.   Per patient, she does not have any ringing in the ears or dizziness since she has not moved or gotten up from her bed. She does report a pulsating feeling/sensation (not noise) in both her ears which started during her 2nd blood transfusion yesterday. It has since improved then but she still has the sensation inside her ears. Denies dizziness/lightheadedness, headache, blurry or double vision, motor weakness or sensation changes.       PAST MEDICAL & SURGICAL HISTORY:  Type 2 diabetes mellitus  2013      Hyperlipidemia      Intraductal cancer  Left breast s/p lumpectomy and adjuvant radiation      H/O carpal tunnel syndrome      Vitamin D deficiency      Osteopenia      Thyroid nodule       novel coronavirus disease (COVID-19)  2020- mild symptoms, did not require treatment      H/O thyroid nodule      History of   ,,      H/O tubal ligation        H/O lumpectomy  2018, left breast      History of carpal tunnel release  Bilateral        Allergies    No Known Allergies    Intolerances      Home Medications:  anastrozole 1 mg oral tablet: 1 tab(s) orally once a day (at bedtime) (2025 11:30)  atorvastatin 10 mg oral tablet: 1 tab(s) orally once a day (at bedtime) (2025 11:30)  Flonase 50 mcg/inh nasal spray: 1 spray(s) nasal once a day (2025 11:30)  metFORMIN 1000 mg oral tablet: orally 2 times a day (2025 11:30)  Mounjaro 7.5 mg/0.5 mL subcutaneous solution: 7.5 milligram(s) subcutaneously (2025 11:30)  Vitamin D3 25 mcg (1000 intl units) oral tablet: 1 tab(s) orally once a day (2025 11:30)    MEDICATIONS  (STANDING):  ceFAZolin   IVPB 2000 milliGRAM(s) IV Intermittent every 8 hours  dextrose 5%. 1000 milliLiter(s) (50 mL/Hr) IV Continuous <Continuous>  dextrose 5%. 1000 milliLiter(s) (100 mL/Hr) IV Continuous <Continuous>  dextrose 50% Injectable 25 Gram(s) IV Push once  dextrose 50% Injectable 12.5 Gram(s) IV Push once  dextrose 50% Injectable 25 Gram(s) IV Push once  glucagon  Injectable 1 milliGRAM(s) IntraMuscular once  insulin lispro (ADMELOG) corrective regimen sliding scale   SubCutaneous three times a day before meals  insulin lispro (ADMELOG) corrective regimen sliding scale   SubCutaneous at bedtime  sodium chloride 0.9% Bolus 1000 milliLiter(s) IV Bolus once  sodium chloride 0.9%. 1000 milliLiter(s) (125 mL/Hr) IV Continuous <Continuous>      SOCIAL HISTORY:  FAMILY HISTORY:  FH: stroke (Mother)    FH: type 1 diabetes (Mother)        ___________________________________________  OBJECTIVE:  Vital Signs Last 24 Hrs  T(C): 36.4 (2025 00:52), Max: 36.7 (2025 11:33)  T(F): 97.6 (2025 00:52), Max: 98.1 (2025 11:33)  HR: 69 (2025 00:52) (50 - 97)  BP: 139/65 (2025 00:52) (75/44 - 163/83)  BP(mean): --  RR: 20 (2025 00:52) (10 - 20)  SpO2: 98% (2025 00:52) (94% - 100%)    Parameters below as of 2025 00:52  Patient On (Oxygen Delivery Method): room air    CAPILLARY BLOOD GLUCOSE      POCT Blood Glucose.: 320 mg/dL (2025 22:55)    I&O's Detail    General: Well developed, well nourished, NAD  Neuro: Alert and oriented, no focal deficits, moves all extremities spontaneously    Abdomen: TTP, MORALES with some blood    ____________________________________________  LABS:  CBC Full  -  ( 2025 23:46 )  WBC Count : 18.85 K/uL  RBC Count : 3.17 M/uL  Hemoglobin : 9.2 g/dL  Hematocrit : 28.3 %  Platelet Count - Automated : 160 K/uL  Mean Cell Volume : 89.3 fl  Mean Cell Hemoglobin : 29.0 pg  Mean Cell Hemoglobin Concentration : 32.5 g/dL  Auto Neutrophil # : 16.34 K/uL  Auto Lymphocyte # : 1.12 K/uL  Auto Monocyte # : 1.30 K/uL  Auto Eosinophil # : 0.00 K/uL  Auto Basophil # : 0.02 K/uL  Auto Neutrophil % : 86.7 %  Auto Lymphocyte % : 5.9 %  Auto Monocyte % : 6.9 %  Auto Eosinophil % : 0.0 %  Auto Basophil % : 0.1 %        134[L]  |  104  |  10  ----------------------------<  329[H]  4.3   |  23  |  0.67    Ca    7.3[L]      2025 23:46        PT/INR - ( 2025 23:57 )   PT: 14.4 sec;   INR: 1.25          PTT - ( 2025 23:57 )  PTT:25.9 sec  Urinalysis Basic - ( 2025 00:19 )    Color: Yellow / Appearance: Clear / SG: >1.030 / pH: x  Gluc: x / Ketone: x  / Bili: Negative / Urobili: 0.2 mg/dL   Blood: x / Protein: Negative mg/dL / Nitrite: Negative   Leuk Esterase: Negative / RBC: x / WBC x   Sq Epi: x / Non Sq Epi: x / Bacteria: x            ____________________________________________  MICRO:  RECENT CULTURES:    ____________________________________________  RADIOLOGY:   (2025 01:44)     MEDICATIONS  Home Medications:  anastrozole 1 mg oral tablet: 1 tab(s) orally once a day (at bedtime) (2025 11:30)  atorvastatin 10 mg oral tablet: 1 tab(s) orally once a day (at bedtime) (2025 11:30)  Flonase 50 mcg/inh nasal spray: 1 spray(s) nasal once a day (2025 11:30)  metFORMIN 1000 mg oral tablet: orally 2 times a day (2025 11:30)  Mounjaro 7.5 mg/0.5 mL subcutaneous solution: 7.5 milligram(s) subcutaneously (2025 11:30)  Vitamin D3 25 mcg (1000 intl units) oral tablet: 1 tab(s) orally once a day (2025 11:30)    MEDICATIONS  (STANDING):  acetaminophen     Tablet .. 975 milliGRAM(s) Oral every 6 hours  ceFAZolin   IVPB 2000 milliGRAM(s) IV Intermittent every 8 hours  dextrose 5%. 1000 milliLiter(s) (50 mL/Hr) IV Continuous <Continuous>  dextrose 5%. 1000 milliLiter(s) (100 mL/Hr) IV Continuous <Continuous>  dextrose 50% Injectable 25 Gram(s) IV Push once  dextrose 50% Injectable 12.5 Gram(s) IV Push once  dextrose 50% Injectable 25 Gram(s) IV Push once  enoxaparin Injectable 40 milliGRAM(s) SubCutaneous every 24 hours  glucagon  Injectable 1 milliGRAM(s) IntraMuscular once  insulin lispro (ADMELOG) corrective regimen sliding scale   SubCutaneous three times a day before meals  insulin lispro (ADMELOG) corrective regimen sliding scale   SubCutaneous at bedtime  sodium chloride 0.9%. 1000 milliLiter(s) (75 mL/Hr) IV Continuous <Continuous>    MEDICATIONS  (PRN):  dextrose Oral Gel 15 Gram(s) Oral once PRN Blood Glucose LESS THAN 70 milliGRAM(s)/deciliter  oxyCODONE    IR 5 milliGRAM(s) Oral every 4 hours PRN Moderate Pain (4 - 6)  oxyCODONE    IR 10 milliGRAM(s) Oral every 4 hours PRN Severe Pain (7 - 10)      FAMILY HISTORY:  FH: stroke (Mother)    FH: type 1 diabetes (Mother)      SOCIAL HISTORY: negative for tobacco, alcohol, or ilicit drug use.    Allergies    No Known Allergies    Intolerances        GEN: NAD, pleasant, cooperative    NEURO:   MENTAL STATUS: AAOx3  LANG/SPEECH: Fluent, intact naming, repetition & comprehension. No dysarthria   CRANIAL NERVES:  II: Pupils equal and reactive, no RAPD, normal visual field  III, IV, VI: EOM intact, no gaze preference or deviation  V: normal  VII: no facial asymmetry  VIII: normal hearing to speech and finger rub BL  MOTOR: 5-/5 in both upper and 4+/5 lower extremities (pain limited-difficulty exerting or resisting force due to pain in her abdomen)  REFLEXES: 2+ throughout, bilateral flexor plantars  SENSORY: Normal to touch, temperature & pin prick in all extremities  COORD: Normal finger to nose BL   Gait: Deferred  NIHSS: Deferred    LABS:                        7.8    7.75  )-----------( 130      ( 2025 06:41 )             23.7     06-29    137  |  111[H]  |  11  ----------------------------<  122[H]  3.8   |  22  |  0.69    Ca    7.5[L]      2025 06:41  Phos  4.3     06-28  Mg     1.5     06-28      Hemoglobin A1C:   Vitamin B12   PT/INR - ( 2025 12:34 )   PT: 13.4 sec;   INR: 1.15          PTT - ( 2025 12:34 )  PTT:23.9 sec  CAPILLARY BLOOD GLUCOSE      POCT Blood Glucose.: 182 mg/dL (2025 16:51)      Urinalysis Basic - ( 2025 13:34 )    Color: Yellow / Appearance: Clear / S.014 / pH: x  Gluc: x / Ketone: x  / Bili: Negative / Urobili: 0.2 mg/dL   Blood: x / Protein: Negative mg/dL / Nitrite: Negative   Leuk Esterase: Negative / RBC: x / WBC x   Sq Epi: x / Non Sq Epi: x / Bacteria: x            Microbiology:    Urinalysis with Rflx Culture (collected 2025 00:19)        RADIOLOGY, EKG AND ADDITIONAL TESTS: Reviewed.           NEUROLOGY CONSULT    HPI:    Patient is a 59 yr old female with PMH of DM2 and Peripheral Vertigo who is admitted to [facility] after 2 syncopal episodes i/s/o of hypotension S/P panniculectomy on . Neurology consulted for dizziness and BL tinnitus. Per chart review, Pt had 2 syncopal/presyncopal episodes after the procedure when she was walking to the the bathroom. Found to be hypotensive with downtrending Hgb 13.9 -> 10.1 -> 9.2. CTAP w/wo shows s/p panniculectomy with abundant mixed fluid and blood products in lower ventral abdominal wall (dominant hematoma approximately 1 L on the left), drains course through the body wall fluid/hematoma but no evidence of brisk bleeding.   Per patient, she does not have any ringing in the ears or dizziness since she has not moved or gotten up from her bed. She does report a pulsating feeling/sensation (not noise) in both her ears which started during her 2nd blood transfusion yesterday. It has since improved then but she still has the sensation inside her ears. Denies dizziness/lightheadedness, headache, blurry or double vision, motor weakness or sensation changes.       PAST MEDICAL & SURGICAL HISTORY:  Type 2 diabetes mellitus  2013      Hyperlipidemia      Intraductal cancer  Left breast s/p lumpectomy and adjuvant radiation      H/O carpal tunnel syndrome      Vitamin D deficiency      Osteopenia      Thyroid nodule       novel coronavirus disease (COVID-19)  2020- mild symptoms, did not require treatment      H/O thyroid nodule      History of   ,,      H/O tubal ligation        H/O lumpectomy  2018, left breast      History of carpal tunnel release  Bilateral        Allergies    No Known Allergies    Intolerances      Home Medications:  anastrozole 1 mg oral tablet: 1 tab(s) orally once a day (at bedtime) (2025 11:30)  atorvastatin 10 mg oral tablet: 1 tab(s) orally once a day (at bedtime) (2025 11:30)  Flonase 50 mcg/inh nasal spray: 1 spray(s) nasal once a day (2025 11:30)  metFORMIN 1000 mg oral tablet: orally 2 times a day (2025 11:30)  Mounjaro 7.5 mg/0.5 mL subcutaneous solution: 7.5 milligram(s) subcutaneously (2025 11:30)  Vitamin D3 25 mcg (1000 intl units) oral tablet: 1 tab(s) orally once a day (2025 11:30)    MEDICATIONS  (STANDING):  ceFAZolin   IVPB 2000 milliGRAM(s) IV Intermittent every 8 hours  dextrose 5%. 1000 milliLiter(s) (50 mL/Hr) IV Continuous <Continuous>  dextrose 5%. 1000 milliLiter(s) (100 mL/Hr) IV Continuous <Continuous>  dextrose 50% Injectable 25 Gram(s) IV Push once  dextrose 50% Injectable 12.5 Gram(s) IV Push once  dextrose 50% Injectable 25 Gram(s) IV Push once  glucagon  Injectable 1 milliGRAM(s) IntraMuscular once  insulin lispro (ADMELOG) corrective regimen sliding scale   SubCutaneous three times a day before meals  insulin lispro (ADMELOG) corrective regimen sliding scale   SubCutaneous at bedtime  sodium chloride 0.9% Bolus 1000 milliLiter(s) IV Bolus once  sodium chloride 0.9%. 1000 milliLiter(s) (125 mL/Hr) IV Continuous <Continuous>      SOCIAL HISTORY:  FAMILY HISTORY:  FH: stroke (Mother)    FH: type 1 diabetes (Mother)        ___________________________________________  OBJECTIVE:  Vital Signs Last 24 Hrs  T(C): 36.4 (2025 00:52), Max: 36.7 (2025 11:33)  T(F): 97.6 (2025 00:52), Max: 98.1 (2025 11:33)  HR: 69 (2025 00:52) (50 - 97)  BP: 139/65 (2025 00:52) (75/44 - 163/83)  BP(mean): --  RR: 20 (2025 00:52) (10 - 20)  SpO2: 98% (2025 00:52) (94% - 100%)    Parameters below as of 2025 00:52  Patient On (Oxygen Delivery Method): room air    CAPILLARY BLOOD GLUCOSE      POCT Blood Glucose.: 320 mg/dL (2025 22:55)    I&O's Detail    General: Well developed, well nourished, NAD  Neuro: Alert and oriented, no focal deficits, moves all extremities spontaneously    Abdomen: TTP, MORALES with some blood    ____________________________________________  LABS:  CBC Full  -  ( 2025 23:46 )  WBC Count : 18.85 K/uL  RBC Count : 3.17 M/uL  Hemoglobin : 9.2 g/dL  Hematocrit : 28.3 %  Platelet Count - Automated : 160 K/uL  Mean Cell Volume : 89.3 fl  Mean Cell Hemoglobin : 29.0 pg  Mean Cell Hemoglobin Concentration : 32.5 g/dL  Auto Neutrophil # : 16.34 K/uL  Auto Lymphocyte # : 1.12 K/uL  Auto Monocyte # : 1.30 K/uL  Auto Eosinophil # : 0.00 K/uL  Auto Basophil # : 0.02 K/uL  Auto Neutrophil % : 86.7 %  Auto Lymphocyte % : 5.9 %  Auto Monocyte % : 6.9 %  Auto Eosinophil % : 0.0 %  Auto Basophil % : 0.1 %        134[L]  |  104  |  10  ----------------------------<  329[H]  4.3   |  23  |  0.67    Ca    7.3[L]      2025 23:46        PT/INR - ( 2025 23:57 )   PT: 14.4 sec;   INR: 1.25          PTT - ( 2025 23:57 )  PTT:25.9 sec  Urinalysis Basic - ( 2025 00:19 )    Color: Yellow / Appearance: Clear / SG: >1.030 / pH: x  Gluc: x / Ketone: x  / Bili: Negative / Urobili: 0.2 mg/dL   Blood: x / Protein: Negative mg/dL / Nitrite: Negative   Leuk Esterase: Negative / RBC: x / WBC x   Sq Epi: x / Non Sq Epi: x / Bacteria: x            ____________________________________________  MICRO:  RECENT CULTURES:    ____________________________________________  RADIOLOGY:   (2025 01:44)     MEDICATIONS  Home Medications:  anastrozole 1 mg oral tablet: 1 tab(s) orally once a day (at bedtime) (2025 11:30)  atorvastatin 10 mg oral tablet: 1 tab(s) orally once a day (at bedtime) (2025 11:30)  Flonase 50 mcg/inh nasal spray: 1 spray(s) nasal once a day (2025 11:30)  metFORMIN 1000 mg oral tablet: orally 2 times a day (2025 11:30)  Mounjaro 7.5 mg/0.5 mL subcutaneous solution: 7.5 milligram(s) subcutaneously (2025 11:30)  Vitamin D3 25 mcg (1000 intl units) oral tablet: 1 tab(s) orally once a day (2025 11:30)    MEDICATIONS  (STANDING):  acetaminophen     Tablet .. 975 milliGRAM(s) Oral every 6 hours  ceFAZolin   IVPB 2000 milliGRAM(s) IV Intermittent every 8 hours  dextrose 5%. 1000 milliLiter(s) (50 mL/Hr) IV Continuous <Continuous>  dextrose 5%. 1000 milliLiter(s) (100 mL/Hr) IV Continuous <Continuous>  dextrose 50% Injectable 25 Gram(s) IV Push once  dextrose 50% Injectable 12.5 Gram(s) IV Push once  dextrose 50% Injectable 25 Gram(s) IV Push once  enoxaparin Injectable 40 milliGRAM(s) SubCutaneous every 24 hours  glucagon  Injectable 1 milliGRAM(s) IntraMuscular once  insulin lispro (ADMELOG) corrective regimen sliding scale   SubCutaneous three times a day before meals  insulin lispro (ADMELOG) corrective regimen sliding scale   SubCutaneous at bedtime  sodium chloride 0.9%. 1000 milliLiter(s) (75 mL/Hr) IV Continuous <Continuous>    MEDICATIONS  (PRN):  dextrose Oral Gel 15 Gram(s) Oral once PRN Blood Glucose LESS THAN 70 milliGRAM(s)/deciliter  oxyCODONE    IR 5 milliGRAM(s) Oral every 4 hours PRN Moderate Pain (4 - 6)  oxyCODONE    IR 10 milliGRAM(s) Oral every 4 hours PRN Severe Pain (7 - 10)      FAMILY HISTORY:  FH: stroke (Mother)    FH: type 1 diabetes (Mother)      SOCIAL HISTORY: negative for tobacco, alcohol, or ilicit drug use.    Allergies    No Known Allergies    Intolerances        GEN: NAD, pleasant, cooperative    NEURO:   MENTAL STATUS: AAOx3  LANG/SPEECH: Fluent, intact naming, repetition & comprehension. No dysarthria   CRANIAL NERVES:  II: Pupils equal and reactive, no RAPD, normal visual field  III, IV, VI: EOM intact, no gaze preference or deviation  V: normal  VII: no facial asymmetry  VIII: normal hearing to speech and finger rub BL  MOTOR: 5-/5 in both upper and 4+/5 lower extremities (pain limited-difficulty exerting or resisting force due to pain in her abdomen)  REFLEXES: 2+ throughout, bilateral flexor plantars  SENSORY: Normal to touch, temperature & pin prick in all extremities  COORD: Normal finger to nose BL   Gait: Deferred  NIHSS: Deferred    LABS:                        7.8    7.75  )-----------( 130      ( 2025 06:41 )             23.7     06-29    137  |  111[H]  |  11  ----------------------------<  122[H]  3.8   |  22  |  0.69    Ca    7.5[L]      2025 06:41  Phos  4.3     06-28  Mg     1.5     06-28      Hemoglobin A1C:   Vitamin B12   PT/INR - ( 2025 12:34 )   PT: 13.4 sec;   INR: 1.15          PTT - ( 2025 12:34 )  PTT:23.9 sec  CAPILLARY BLOOD GLUCOSE      POCT Blood Glucose.: 182 mg/dL (2025 16:51)      Urinalysis Basic - ( 2025 13:34 )    Color: Yellow / Appearance: Clear / S.014 / pH: x  Gluc: x / Ketone: x  / Bili: Negative / Urobili: 0.2 mg/dL   Blood: x / Protein: Negative mg/dL / Nitrite: Negative   Leuk Esterase: Negative / RBC: x / WBC x   Sq Epi: x / Non Sq Epi: x / Bacteria: x            Microbiology:    Urinalysis with Rflx Culture (collected 2025 00:19)        RADIOLOGY, EKG AND ADDITIONAL TESTS: Reviewed.

## 2025-06-29 NOTE — PROGRESS NOTE ADULT - SUBJECTIVE AND OBJECTIVE BOX
patient remained hypotensive yesterday. Transfused 1 unit PRBCs. RRT was called after she attempted to get up but became diaphoretic and near syncopal. At the time of second transfusion she developed reaction requiring cessation of transfusion and Benadryl PO. This morning Hb is 7.7. Fluids are running at 125 cc/hr. She remained NPO for most of the day yesterday. BP overnight around 90-100s. Afebrile. UOP 1.6L. Drain outout 58 cc from left side and 23 cc from the right. On exam, she is slightly TTP in the outer quadrants. No redness. Incision intact. Drains sanguinous appearing on the left and ss on the right.

## 2025-06-29 NOTE — PROVIDER CONTACT NOTE (CHANGE IN STATUS NOTIFICATION) - SITUATION
Pt started 1 unit of PRBC at 2107, around 2142 pt reported that her bilateral ears were pulsating and heart was beating fast.

## 2025-06-29 NOTE — CONSULT NOTE ADULT - ATTENDING COMMENTS
Briefly, Patient is a 58 yo Female with Pmhx of DM, vertebral artery stenosis, breast cancer (dx 2019, s/p RT and chemo completed 6 months ago) s/p panniculectomy 6/27th at [facility] c/b episode of syncope in the setting of hypotension and acute anemia, found to have addominal Hematoma with no active bleeding. Cardiology was consulted for Abnormal EKG evaluation  - Patient seen and examined at bedside, all data reviewed  - Tele showed Sinus tach last on 06/28. Otherwise patient has remained in NSR with HR in the 80-90's range without tachy or bradyarrhythmias noted  - ECG reviewed showing sinus bradycardia with non specific ST changes    - During hospitalization patient experienced near syncopal event, likely in setting of Acute anemia and orthostatic Hypotension, with lowest Hg of 7.5  from baseline of 13  - Clinically she is warm, well perfused and compensated. Difficult to assess JVD given abdominal binder, however there is no lower extremity edema  - Prior to Hospitalization patient reported MARTINEZ with stairs and significant exertion. Chemo Regimen for Breast CA is unknown  - Would obtain ECHO for structural assessment.   - A1C of 7.4; Please add on TSH and lipid profile to am labs  - Patient also reporting pulsatile tinnitus and being evaluated by Neuro, pending possible inpatient MRI  - Cardiology will cont to follow with you, please call with any questions

## 2025-06-29 NOTE — CONSULT NOTE ADULT - ATTENDING COMMENTS
Pt with unusual feeling of 'sausages bursting out of her skin' below the knee on the left and less-so on the right.    Pt feels loss of sensation is down to 10% on the left and 50% on the right.  Power is slightly weaker in EHL on the left 4+, but otherwise motor exam is normal distally, including inversion and eversion.  The knee joint seemed unstable with quad/hamstring testing so did not test full resistance.  Spreading of reflexes with left patellar reflex into bilateral adductors raises concern for UMN sign, and thus imaging of such pending, including brain for parafalcine lesion. Pt with pulsatile tinnitus, coming and going - seems timed with transfusions.  Tinnitus time-locked to pulse at the wrist.    Worthwhile screening for structural abN with MRI IAC/brain.

## 2025-06-29 NOTE — CONSULT NOTE ADULT - ASSESSMENT
60 yo female with history of HLD and DM s/p panniculectomy 6/27th at [facility] c/b episode of syncope in the setting of hypotension and acute anemia. Transferred to [facility]. CTAP with fluid collection in the abdomen, likely hematoma without active bleeding. She received IVF and 1 U PRBC. Course c/b pre-syncopal event 6/29 thought to be also 2/2 orthostatic hypotension. Second unit PRBC discontinued after possible transfusion reaction. Cardiology consulted for concern for an abnormal EKG.     Review of studies:   EKG 6/26/25: Sinus bradycardia NSST   EKG 6/28: NSR NSST   telemetry reviewed- sinus tachycardia     #c/f abnormal EKG   #syncope/pre-syncope likely 2/2 acute anemia and orthostatic hypotension  - there is no arrythmia or abnormality on ekg or telemetry that would explain current syncopal episodes   - exam notable for murmur at lSB and bounding carotids, would order TTE to assess valvular function  - continue to monitor on tele   - management of fluids/acute anemia per primary surgical team    Preliminary recommendations. Please see attending attestation for final recommendations 60 yo female with history of HLD and DM s/p panniculectomy 6/27th at [facility] c/b episode of syncope in the setting of hypotension and acute anemia. Transferred to [facility]. CTAP with fluid collection in the abdomen, likely hematoma without active bleeding. She received IVF and 1 U PRBC. Course c/b pre-syncopal event 6/29 thought to be also 2/2 orthostatic hypotension. Second unit PRBC discontinued after possible transfusion reaction. Cardiology consulted for concern for an abnormal EKG.     Review of studies:   EKG 6/26/25: Sinus bradycardia NSST   EKG 6/28: NSR NSST   telemetry reviewed- sinus tachycardia     #c/f abnormal EKG   #syncope/pre-syncope likely 2/2 acute anemia and orthostatic hypotension  - there is no arrythmia or abnormality on ekg or telemetry that would explain current syncopal episodes   - exam notable for murmur at lSB and bounding carotids, would order TTE to assess valvular function  - continue to monitor on tele   - management of acute anemia/IVF per primary surgical team, monitor fluid status to avoid overlaod  - patient with fever this am, infectious work-up per primary team    Preliminary recommendations. Please see attending attestation for final recommendations 60 yo female with history of HLD and DM s/p panniculectomy 6/27th at [facility] c/b episode of syncope in the setting of hypotension and acute anemia. Transferred to [facility]. CTAP with fluid collection in the abdomen, likely hematoma without active bleeding. She received IVF and 1 U PRBC. Course c/b pre-syncopal event 6/29 thought to be also 2/2 orthostatic hypotension. Second unit PRBC discontinued after possible transfusion reaction. Cardiology consulted for concern for an abnormal EKG.     Review of studies:   EKG 6/26/25: Sinus bradycardia NSST   EKG 6/28: NSR NSST   telemetry reviewed- on initial admission, hr in 80s, has been uptrending to 110 in sinus tachycardia     #c/f abnormal EKG   #syncope/pre-syncope likely 2/2 acute anemia and orthostatic hypotension, PE remains on the differential  - there is no arrythmia or abnormality on ekg or telemetry that would explain current syncopal episodes   - exam notable for murmur at lSB and bounding carotids, would order TTE to assess valvular function  - continue to monitor on tele   - She has been off AC due to acute anemia. Wells score 3 (moderate risk). Consider CTA to r/o PE  - management of acute anemia/IVF per primary surgical team, monitor fluid status to avoid overload  - patient with fever this am, infectious work-up per primary team    Preliminary recommendations. Please see attending attestation for final recommendations 58 yo female with history of HLD, DM, vertebral artery stenosis, breast cancer (dx 2019, s/p RT and chemo completed 6 months ago) s/p panniculectomy 6/27th at [facility] c/b episode of syncope in the setting of hypotension and acute anemia. Transferred to [facility]. CTAP with fluid collection in the abdomen, likely hematoma without active bleeding. She received IVF and 1 U PRBC. Course c/b pre-syncopal event 6/29 thought to be also 2/2 orthostatic hypotension. Second unit PRBC discontinued after possible transfusion reaction. Cardiology consulted for concern for an abnormal EKG.     Review of studies:   EKG 6/26/25: Sinus bradycardia NSST   EKG 6/28: NSR NSST   telemetry reviewed- on initial admission, hr in 80s, has been uptrending to 110 in sinus tachycardia     #c/f abnormal EKG   #syncope/pre-syncope likely 2/2 acute anemia and orthostatic hypotension possibly exacerbated by vertebral artery stenosis, PE remains on the differential   - there is no arrythmia or abnormality on ekg or telemetry that would explain current syncopal episodes   - exam notable for murmur at lSB and bounding carotids, would order TTE to assess valvular function - brief bedside pocus limited by abdominal pain/ bandaging- PLAX/PSAX view BIV fx appears preserved  - continue to monitor on tele   - AC was held in the setting of anemia, restarted today. Wells score 3 (moderate risk). Consider CTA to r/o PE  - management of acute anemia/IVF per primary surgical team, monitor fluid status to avoid overload  - patient with fever this am, infectious work-up per primary team    Preliminary recommendations. Please see attending attestation for final recommendations

## 2025-06-29 NOTE — PROGRESS NOTE ADULT - SUBJECTIVE AND OBJECTIVE BOX
Patient seen and examined this AM.   Denies CP/ SOB this AM.   Mild tachycardia.   Was being given a second unit and began to feel pulsations between ears and therefore it was stopped.     Hgb: 7.8    Physical Exam:   abdomen soft, umbo viable, no collections palpated  serosanguinous drainage from drains  incisions intact  no neuro deficits    Patient seen this AM. Improving.   Encouraged incentive spirometer.  Cardiac consult due to tachycardia  Neuro consult due to dizziness  Bilateral lower extremity venous duplex  Diet  Mobilize  monitor UOP

## 2025-06-29 NOTE — CONSULT NOTE ADULT - ASSESSMENT
Patient is a 59 yr old female with PMH of DM2, Peripheral Vertigo who is admitted for syncopal episode i/s/o of hypotension after her panniculectomy on 6/27. Had 2 syncopal/presyncopal episodes on her way to the bathroom. Found to be hypotensive with downtreding Hgb. Neurology consulted for dizziness and BL tinnitis, however Pt only endorsing BL pulsatile ear sensation. Symptom onset during her 2nd blood transfusion yesterday. Denies ringing in ears, earing loss, dizziness/lightheadedness, headache, blurry or double vision, motor weakness or sensation changes. CTAP w/wo shows s/p panniculectomy with abundant mixed fluid and blood products in lower ventral abdominal wall (dominant hematoma approximately 1 L on the left), drains course through the body wall fluid/hematoma but no evidence of brisk bleeding. On exam, no focal deficits noted. Given histoy and presentation, suspect symtoms are likely 2/2 to high output cardiac states i/s/o acute anemia vs transfusion reaction or post-op physiologic stress, less likely underlying acute neurological etiology.     Recommendation:   - Can follow up with outpatient neurology for further imaging if symptoms persist.  - Rest per primary team.    Case discussed with .    Patient is a 59 yr old female with PMH of DM2, Peripheral Vertigo who is admitted for syncopal episode i/s/o of hypotension after her panniculectomy on 6/27. Had 2 syncopal/presyncopal episodes on her way to the bathroom. Found to be hypotensive with downtreding Hgb. Neurology consulted for dizziness and BL tinnitis, however Pt only endorsing BL pulsatile ear sensation. Symptom onset during her 2nd blood transfusion yesterday. Denies ringing in ears, earing loss, dizziness/lightheadedness, headache, blurry or double vision, motor weakness or sensation changes. CTAP w/wo shows s/p panniculectomy with abundant mixed fluid and blood products in lower ventral abdominal wall (dominant hematoma approximately 1 L on the left), drains course through the body wall fluid/hematoma but no evidence of brisk bleeding. On exam, no focal deficits noted. Given histoy and presentation, suspect symtoms are likely 2/2 to high output cardiac states i/s/o acute anemia vs transfusion reaction or post-op physiologic stress, less likely underlying acute neurological etiology.     Recommendation:   - MRI head w/wo ([facility] protocol)- Can be done outpatient if Pt is being discharged.   - Rest per primary team.    Case discussed with  and .    Patient is a 59 yr old female with PMH of DM2, Peripheral Vertigo who is admitted for syncopal episode i/s/o of hypotension after her panniculectomy on 6/27. Had 2 syncopal/presyncopal episodes on her way to the bathroom. Found to be hypotensive with downtrending Hgb. Neurology consulted for dizziness and BL tinnitis, however Pt only endorsing BL pulsatile ear sensation. Symptom onset during her 2nd blood transfusion yesterday. Denies ringing in ears, earing loss, dizziness/lightheadedness, headache, blurry or double vision, motor weakness or sensation changes. CTAP w/wo shows s/p panniculectomy with abundant mixed fluid and blood products in lower ventral abdominal wall (dominant hematoma approximately 1 L on the left), drains course through the body wall fluid/hematoma but no evidence of brisk bleeding. On exam, no focal deficits noted. Given history and presentation, suspect symptoms are likely 2/2 to high output cardiac states i/s/o acute anemia vs transfusion reaction or post-op physiologic stress, less likely underlying acute neurological etiology.     Recommendation:   - MRI head w/wo ([facility] protocol)- Can be done outpatient if Pt is being discharged.   - Rest per primary team.    Case discussed with  and .

## 2025-06-30 ENCOUNTER — RESULT REVIEW (OUTPATIENT)
Age: 60
End: 2025-06-30

## 2025-06-30 LAB
ANION GAP SERPL CALC-SCNC: 7 MMOL/L — SIGNIFICANT CHANGE UP (ref 5–17)
BUN SERPL-MCNC: 7 MG/DL — SIGNIFICANT CHANGE UP (ref 7–23)
CALCIUM SERPL-MCNC: 7.5 MG/DL — LOW (ref 8.4–10.5)
CHLORIDE SERPL-SCNC: 110 MMOL/L — HIGH (ref 96–108)
CO2 SERPL-SCNC: 24 MMOL/L — SIGNIFICANT CHANGE UP (ref 22–31)
CREAT SERPL-MCNC: 0.6 MG/DL — SIGNIFICANT CHANGE UP (ref 0.5–1.3)
EGFR: 105 ML/MIN/1.73M2 — SIGNIFICANT CHANGE UP
EGFR: 105 ML/MIN/1.73M2 — SIGNIFICANT CHANGE UP
GLUCOSE SERPL-MCNC: 147 MG/DL — HIGH (ref 70–99)
HCT VFR BLD CALC: 22.6 % — LOW (ref 34.5–45)
HCT VFR BLD CALC: 27.3 % — LOW (ref 34.5–45)
HGB BLD-MCNC: 7.5 G/DL — LOW (ref 11.5–15.5)
HGB BLD-MCNC: 9 G/DL — LOW (ref 11.5–15.5)
MCHC RBC-ENTMCNC: 29.3 PG — SIGNIFICANT CHANGE UP (ref 27–34)
MCHC RBC-ENTMCNC: 29.9 PG — SIGNIFICANT CHANGE UP (ref 27–34)
MCHC RBC-ENTMCNC: 33 G/DL — SIGNIFICANT CHANGE UP (ref 32–36)
MCHC RBC-ENTMCNC: 33.2 G/DL — SIGNIFICANT CHANGE UP (ref 32–36)
MCV RBC AUTO: 88.9 FL — SIGNIFICANT CHANGE UP (ref 80–100)
MCV RBC AUTO: 90 FL — SIGNIFICANT CHANGE UP (ref 80–100)
NRBC # BLD AUTO: 0 K/UL — SIGNIFICANT CHANGE UP (ref 0–0)
NRBC # BLD AUTO: 0 K/UL — SIGNIFICANT CHANGE UP (ref 0–0)
NRBC # FLD: 0 K/UL — SIGNIFICANT CHANGE UP (ref 0–0)
NRBC # FLD: 0 K/UL — SIGNIFICANT CHANGE UP (ref 0–0)
NRBC BLD AUTO-RTO: 0 /100 WBCS — SIGNIFICANT CHANGE UP (ref 0–0)
NRBC BLD AUTO-RTO: 0 /100 WBCS — SIGNIFICANT CHANGE UP (ref 0–0)
PLATELET # BLD AUTO: 159 K/UL — SIGNIFICANT CHANGE UP (ref 150–400)
PLATELET # BLD AUTO: 163 K/UL — SIGNIFICANT CHANGE UP (ref 150–400)
PMV BLD: 10.8 FL — SIGNIFICANT CHANGE UP (ref 7–13)
PMV BLD: 11 FL — SIGNIFICANT CHANGE UP (ref 7–13)
POTASSIUM SERPL-MCNC: 3.5 MMOL/L — SIGNIFICANT CHANGE UP (ref 3.5–5.3)
POTASSIUM SERPL-SCNC: 3.5 MMOL/L — SIGNIFICANT CHANGE UP (ref 3.5–5.3)
RBC # BLD: 2.51 M/UL — LOW (ref 3.8–5.2)
RBC # BLD: 3.07 M/UL — LOW (ref 3.8–5.2)
RBC # FLD: 13.6 % — SIGNIFICANT CHANGE UP (ref 10.3–14.5)
RBC # FLD: 13.9 % — SIGNIFICANT CHANGE UP (ref 10.3–14.5)
SODIUM SERPL-SCNC: 141 MMOL/L — SIGNIFICANT CHANGE UP (ref 135–145)
TRANSFUSION REACTION INTERP 1: NEGATIVE — SIGNIFICANT CHANGE UP
WBC # BLD: 10.61 K/UL — HIGH (ref 3.8–10.5)
WBC # BLD: 9.47 K/UL — SIGNIFICANT CHANGE UP (ref 3.8–10.5)
WBC # FLD AUTO: 10.61 K/UL — HIGH (ref 3.8–10.5)
WBC # FLD AUTO: 9.47 K/UL — SIGNIFICANT CHANGE UP (ref 3.8–10.5)

## 2025-06-30 PROCEDURE — 99223 1ST HOSP IP/OBS HIGH 75: CPT

## 2025-06-30 PROCEDURE — 93970 EXTREMITY STUDY: CPT | Mod: 26

## 2025-06-30 PROCEDURE — 82803 BLOOD GASES ANY COMBINATION: CPT

## 2025-06-30 PROCEDURE — 83605 ASSAY OF LACTIC ACID: CPT

## 2025-06-30 PROCEDURE — 36430 TRANSFUSION BLD/BLD COMPNT: CPT

## 2025-06-30 PROCEDURE — 86850 RBC ANTIBODY SCREEN: CPT

## 2025-06-30 PROCEDURE — 36415 COLL VENOUS BLD VENIPUNCTURE: CPT

## 2025-06-30 PROCEDURE — 86923 COMPATIBILITY TEST ELECTRIC: CPT

## 2025-06-30 PROCEDURE — 84132 ASSAY OF SERUM POTASSIUM: CPT

## 2025-06-30 PROCEDURE — 85610 PROTHROMBIN TIME: CPT

## 2025-06-30 PROCEDURE — 81003 URINALYSIS AUTO W/O SCOPE: CPT

## 2025-06-30 PROCEDURE — 93306 TTE W/DOPPLER COMPLETE: CPT | Mod: 26

## 2025-06-30 PROCEDURE — 85027 COMPLETE CBC AUTOMATED: CPT

## 2025-06-30 PROCEDURE — 80048 BASIC METABOLIC PNL TOTAL CA: CPT

## 2025-06-30 PROCEDURE — 86901 BLOOD TYPING SEROLOGIC RH(D): CPT

## 2025-06-30 PROCEDURE — 84295 ASSAY OF SERUM SODIUM: CPT

## 2025-06-30 PROCEDURE — 84100 ASSAY OF PHOSPHORUS: CPT

## 2025-06-30 PROCEDURE — 85025 COMPLETE CBC W/AUTO DIFF WBC: CPT

## 2025-06-30 PROCEDURE — 86900 BLOOD TYPING SEROLOGIC ABO: CPT

## 2025-06-30 PROCEDURE — 82330 ASSAY OF CALCIUM: CPT

## 2025-06-30 PROCEDURE — 82962 GLUCOSE BLOOD TEST: CPT

## 2025-06-30 PROCEDURE — 83036 HEMOGLOBIN GLYCOSYLATED A1C: CPT

## 2025-06-30 PROCEDURE — 85730 THROMBOPLASTIN TIME PARTIAL: CPT

## 2025-06-30 PROCEDURE — P9016: CPT

## 2025-06-30 PROCEDURE — 74174 CTA ABD&PLVS W/CONTRAST: CPT

## 2025-06-30 PROCEDURE — 83735 ASSAY OF MAGNESIUM: CPT

## 2025-06-30 RX ADMIN — Medication 975 MILLIGRAM(S): at 11:17

## 2025-06-30 RX ADMIN — Medication 975 MILLIGRAM(S): at 00:01

## 2025-06-30 RX ADMIN — Medication 975 MILLIGRAM(S): at 23:21

## 2025-06-30 RX ADMIN — Medication 100 MILLIGRAM(S): at 06:13

## 2025-06-30 RX ADMIN — INSULIN LISPRO 2: 100 INJECTION, SOLUTION INTRAVENOUS; SUBCUTANEOUS at 07:17

## 2025-06-30 RX ADMIN — Medication 100 MILLIGRAM(S): at 13:44

## 2025-06-30 RX ADMIN — Medication 975 MILLIGRAM(S): at 06:51

## 2025-06-30 RX ADMIN — Medication 975 MILLIGRAM(S): at 17:54

## 2025-06-30 RX ADMIN — Medication 975 MILLIGRAM(S): at 06:03

## 2025-06-30 RX ADMIN — Medication 975 MILLIGRAM(S): at 13:19

## 2025-06-30 RX ADMIN — OXYCODONE HYDROCHLORIDE 5 MILLIGRAM(S): 30 TABLET ORAL at 13:43

## 2025-06-30 RX ADMIN — Medication 100 MILLIGRAM(S): at 21:21

## 2025-06-30 RX ADMIN — Medication 975 MILLIGRAM(S): at 18:58

## 2025-06-30 RX ADMIN — ENOXAPARIN SODIUM 40 MILLIGRAM(S): 100 INJECTION SUBCUTANEOUS at 11:19

## 2025-06-30 RX ADMIN — INSULIN LISPRO 2: 100 INJECTION, SOLUTION INTRAVENOUS; SUBCUTANEOUS at 11:18

## 2025-06-30 NOTE — PATIENT PROFILE ADULT - FALL HARM RISK - HARM RISK INTERVENTIONS

## 2025-06-30 NOTE — PROVIDER CONTACT NOTE (OTHER) - ASSESSMENT
Gauze around MORALES drain saturated with blood and some leakage around tube. Blister by MORALES site. Gauze changed.
Patient is asymptomatic, A&Ox4, NSR, HR 64, RR 16, SPO2 100% on 2L NC. Patient has 2 MORALES drains from the abdomen, sanguinous output, with small bleeding from incision, MD made aware.
Lab called stating Platelets dropped from 157 to 50, Hgb went up from 7.7 to 8.4
Patient A&Ox4, no complaints of pain or acute discomfort at this time. Patient denies chest pain, headache, dizziness. HR 66, BP 80/47, MAP 59, RR 17, SPO2 100% on 2L NC, NSR. Patient's BP has been consistently in the 80's SBP since 04:00, team has been made aware.
Patient is A&Ox4, HR 66, RR 17, SPO2 100% on 2L NC, NSR, denies any chest pain, palpitations, SOB or acute discomfort at this time. Patient is resting in bed, verbalizes wanting to sleep. Patient receiving NS @125cc per order.
Patient is resting in bed, VS HR 66, BP 91/53, MAP 67, RR 16, SPO2 100% on 2L NC, NSR. Patient's abdominal MORALES drains with moderate sanguinous output. Surgical incisions with drainage, MD made aware.
Pt is diaphoretic sitting at the edge of the bed, pt is alert and answering questions but needs repeated stimulation to respond. Pt maintained conscious throughout episode. Finger stick 163. Vitals charted in flow sheet.

## 2025-06-30 NOTE — PATIENT PROFILE ADULT - NSTRANSFERBELONGINGSRESP_GEN_A_NUR
In an effort to ensure that our patients [employer]Well, a Team Member has reviewed your chart and identified an opportunity to provide the best care possible. An attempt was made to discuss or schedule overdue Preventive or Disease Management screening.     The Outcome was Contact was not made, left messageIf you have any questions or need help with scheduling, contact our Health Outreach Team at [phone]. Care Gaps include Colorectal Cancer Screening, Immunizations, and Wellness Visits.    
yes

## 2025-06-30 NOTE — PROGRESS NOTE ADULT - SUBJECTIVE AND OBJECTIVE BOX
Plastic Surgery Progress Note (pg [facility]: [id number], [facility]: [phone])    SUBJECTIVE  The patient was seen and examined. No acute events overnight.    OBJECTIVE  ___________________________________________________  VITAL SIGNS / I&O's   Vital Signs Last 24 Hrs  T(C): 37.1 (2025 05:32), Max: 38.1 (2025 10:47)  T(F): 98.7 (2025 05:32), Max: 100.6 (2025 10:47)  HR: 84 (2025 04:00) (84 - 116)  BP: 117/56 (2025 04:00) (108/52 - 137/65)  BP(mean): 80 (2025 04:00) (75 - 94)  RR: 17 (2025 04:00) (17 - 18)  SpO2: 99% (2025 04:00) (92% - 99%)    Parameters below as of 2025 04:00  Patient On (Oxygen Delivery Method): nasal cannula  O2 Flow (L/min): 2        2025 07:01  -  2025 07:00  --------------------------------------------------------  IN:    sodium chloride 0.9%: 1725 mL  Total IN: 1725 mL    OUT:    Drain (mL): 25 mL    Drain (mL): 30 mL    Indwelling Catheter - Urethral (mL): 2025 mL  Total OUT: 2080 mL    Total NET: -355 mL        ___________________________________________________  PHYSICAL EXAM    -- CONSTITUTIONAL: NAD, lying in bed  -- NEURO: Awake, alert  -- ABDOMEN: soft  drains thing BL  incision cdi  no obvious collections    ___________________________________________________  LABS                        7.8    7.75  )-----------( 130      ( 2025 06:41 )             23.7     2025 06:41    137    |  111    |  11     ----------------------------<  122    3.8     |  22     |  0.69     Ca    7.5        2025 06:41  Phos  4.3       2025 12:34  Mg     1.5       2025 12:34      PT/INR - ( 2025 12:34 )   PT: 13.4 sec;   INR: 1.15          PTT - ( 2025 12:34 )  PTT:23.9 sec  CAPILLARY BLOOD GLUCOSE      POCT Blood Glucose.: 154 mg/dL (2025 07:14)  POCT Blood Glucose.: 224 mg/dL (2025 22:00)  POCT Blood Glucose.: 182 mg/dL (2025 16:51)  POCT Blood Glucose.: 115 mg/dL (2025 11:11)        Urinalysis Basic - ( 2025 13:34 )    Color: Yellow / Appearance: Clear / S.014 / pH: x  Gluc: x / Ketone: x  / Bili: Negative / Urobili: 0.2 mg/dL   Blood: x / Protein: Negative mg/dL / Nitrite: Negative   Leuk Esterase: Negative / RBC: x / WBC x   Sq Epi: x / Non Sq Epi: x / Bacteria: x      ___________________________________________________  MICRO  Recent Cultures:    ___________________________________________________  MEDICATIONS  (STANDING):  acetaminophen     Tablet .. 975 milliGRAM(s) Oral every 6 hours  ceFAZolin   IVPB 2000 milliGRAM(s) IV Intermittent every 8 hours  dextrose 5%. 1000 milliLiter(s) (50 mL/Hr) IV Continuous <Continuous>  dextrose 5%. 1000 milliLiter(s) (100 mL/Hr) IV Continuous <Continuous>  dextrose 50% Injectable 25 Gram(s) IV Push once  dextrose 50% Injectable 12.5 Gram(s) IV Push once  dextrose 50% Injectable 25 Gram(s) IV Push once  enoxaparin Injectable 40 milliGRAM(s) SubCutaneous every 24 hours  glucagon  Injectable 1 milliGRAM(s) IntraMuscular once  insulin lispro (ADMELOG) corrective regimen sliding scale   SubCutaneous three times a day before meals  insulin lispro (ADMELOG) corrective regimen sliding scale   SubCutaneous at bedtime  sodium chloride 0.9%. 1000 milliLiter(s) (75 mL/Hr) IV Continuous <Continuous>    MEDICATIONS  (PRN):  dextrose Oral Gel 15 Gram(s) Oral once PRN Blood Glucose LESS THAN 70 milliGRAM(s)/deciliter  oxyCODONE    IR 5 milliGRAM(s) Oral every 4 hours PRN Moderate Pain (4 - 6)  oxyCODONE    IR 10 milliGRAM(s) Oral every 4 hours PRN Severe Pain (7 - 10)

## 2025-06-30 NOTE — PROVIDER CONTACT NOTE (OTHER) - DATE AND TIME:
28-Jun-2025 04:55
28-Jun-2025 14:27
28-Jun-2025 06:25
28-Jun-2025 07:55
28-Jun-2025 19:26
28-Jun-2025 04:18
30-Jun-2025 10:55

## 2025-06-30 NOTE — PROGRESS NOTE ADULT - SUBJECTIVE AND OBJECTIVE BOX
Cardiology Consult      HPI:  Plastic Surgery  (pg [facility]: [id number], NS: [phone])    HPI:  60 yo F w PMH of DM2, from [facility] s/p panniculectomy today, had syncopal episdoes in PACU, downtrending H/H. Pt was increasingly hypotensive. [facility] Called Dr. Hannah who had pt transfered. In ED, BP began to normalized. CT obtained which is pending. H/H currently stable from latest at [facility].    PAST MEDICAL & SURGICAL HISTORY:  Type 2 diabetes mellitus        Hyperlipidemia      Intraductal cancer  Left breast s/p lumpectomy and adjuvant radiation      H/O carpal tunnel syndrome      Vitamin D deficiency      Osteopenia      Thyroid nodule       novel coronavirus disease (COVID-19)  2020- mild symptoms, did not require treatment      H/O thyroid nodule      History of   ,,      H/O tubal ligation  2013      H/O lumpectomy  2018, left breast      History of carpal tunnel release  Bilateral        Allergies    No Known Allergies    Intolerances      Home Medications:  anastrozole 1 mg oral tablet: 1 tab(s) orally once a day (at bedtime) (2025 11:30)  atorvastatin 10 mg oral tablet: 1 tab(s) orally once a day (at bedtime) (2025 11:30)  Flonase 50 mcg/inh nasal spray: 1 spray(s) nasal once a day (2025 11:30)  metFORMIN 1000 mg oral tablet: orally 2 times a day (2025 11:30)  Mounjaro 7.5 mg/0.5 mL subcutaneous solution: 7.5 milligram(s) subcutaneously (2025 11:30)  Vitamin D3 25 mcg (1000 intl units) oral tablet: 1 tab(s) orally once a day (2025 11:30)    MEDICATIONS  (STANDING):  ceFAZolin   IVPB 2000 milliGRAM(s) IV Intermittent every 8 hours  dextrose 5%. 1000 milliLiter(s) (50 mL/Hr) IV Continuous <Continuous>  dextrose 5%. 1000 milliLiter(s) (100 mL/Hr) IV Continuous <Continuous>  dextrose 50% Injectable 25 Gram(s) IV Push once  dextrose 50% Injectable 12.5 Gram(s) IV Push once  dextrose 50% Injectable 25 Gram(s) IV Push once  glucagon  Injectable 1 milliGRAM(s) IntraMuscular once  insulin lispro (ADMELOG) corrective regimen sliding scale   SubCutaneous three times a day before meals  insulin lispro (ADMELOG) corrective regimen sliding scale   SubCutaneous at bedtime  sodium chloride 0.9% Bolus 1000 milliLiter(s) IV Bolus once  sodium chloride 0.9%. 1000 milliLiter(s) (125 mL/Hr) IV Continuous <Continuous>      SOCIAL HISTORY:  FAMILY HISTORY:  FH: stroke (Mother)    FH: type 1 diabetes (Mother)        ___________________________________________  OBJECTIVE:  Vital Signs Last 24 Hrs  T(C): 36.4 (2025 00:52), Max: 36.7 (2025 11:33)  T(F): 97.6 (2025 00:52), Max: 98.1 (2025 11:33)  HR: 69 (2025 00:52) (50 - 97)  BP: 139/65 (2025 00:52) (75/44 - 163/83)  BP(mean): --  RR: 20 (2025 00:52) (10 - 20)  SpO2: 98% (2025 00:52) (94% - 100%)    Parameters below as of 2025 00:52  Patient On (Oxygen Delivery Method): room air    CAPILLARY BLOOD GLUCOSE      POCT Blood Glucose.: 320 mg/dL (2025 22:55)    I&O's Detail    General: Well developed, well nourished, NAD  Neuro: Alert and oriented, no focal deficits, moves all extremities spontaneously    Abdomen: TTP, MORALES with some blood    ____________________________________________  LABS:  CBC Full  -  ( 2025 23:46 )  WBC Count : 18.85 K/uL  RBC Count : 3.17 M/uL  Hemoglobin : 9.2 g/dL  Hematocrit : 28.3 %  Platelet Count - Automated : 160 K/uL  Mean Cell Volume : 89.3 fl  Mean Cell Hemoglobin : 29.0 pg  Mean Cell Hemoglobin Concentration : 32.5 g/dL  Auto Neutrophil # : 16.34 K/uL  Auto Lymphocyte # : 1.12 K/uL  Auto Monocyte # : 1.30 K/uL  Auto Eosinophil # : 0.00 K/uL  Auto Basophil # : 0.02 K/uL  Auto Neutrophil % : 86.7 %  Auto Lymphocyte % : 5.9 %  Auto Monocyte % : 6.9 %  Auto Eosinophil % : 0.0 %  Auto Basophil % : 0.1 %        134[L]  |  104  |  10  ----------------------------<  329[H]  4.3   |  23  |  0.67    Ca    7.3[L]      2025 23:46        PT/INR - ( 2025 23:57 )   PT: 14.4 sec;   INR: 1.25          PTT - ( 2025 23:57 )  PTT:25.9 sec  Urinalysis Basic - ( 2025 00:19 )    Color: Yellow / Appearance: Clear / SG: >1.030 / pH: x  Gluc: x / Ketone: x  / Bili: Negative / Urobili: 0.2 mg/dL   Blood: x / Protein: Negative mg/dL / Nitrite: Negative   Leuk Esterase: Negative / RBC: x / WBC x   Sq Epi: x / Non Sq Epi: x / Bacteria: x            ____________________________________________  MICRO:  RECENT CULTURES:    ____________________________________________  RADIOLOGY:   (2025 01:44)        Pt seen and examined at bedside, NAD, denies chest pain/pressure/discomfort. ROS s/f ?,      Review of Systems:  CONSTITUTIONAL:  No weight loss, fever, chills, weakness or fatigue.  HEENT:  Eyes:  No visual loss, blurred vision, double vision or yellow sclerae. Ears, Nose, Throat:  No hearing loss, sneezing, congestion, runny nose or sore throat.  SKIN:  No rash or itching.  CARDIOVASCULAR:  No chest pain, chest pressure or chest discomfort. No palpitations or edema.  RESPIRATORY:  No shortness of breath, cough or sputum.  GASTROINTESTINAL:  No anorexia, nausea, vomiting or diarrhea. No abdominal pain or blood.  GENITOURINARY: No Burning on urination.   NEUROLOGICAL:  see HPI  MUSCULOSKELETAL:  No muscle, back pain, joint pain or stiffness.  HEMATOLOGIC:  No anemia, bleeding or bruising.  LYMPHATICS:  No enlarged nodes. No history of splenectomy.  PSYCHIATRIC:  No history of depression or anxiety.  ENDOCRINOLOGIC:  No reports of sweating, cold or heat intolerance. No polyuria or polydipsia.  ALLERGIES:  No history of asthma, hives, eczema or rhinitis.    PAST MEDICAL & SURGICAL HISTORY:  Type 2 diabetes mellitus        Hyperlipidemia      Intraductal cancer  Left breast s/p lumpectomy and adjuvant radiation      H/O carpal tunnel syndrome      Vitamin D deficiency      Osteopenia      Thyroid nodule       novel coronavirus disease (COVID-19)  2020- mild symptoms, did not require treatment      H/O thyroid nodule      History of   ,,      H/O tubal ligation        H/O lumpectomy  , left breast      History of carpal tunnel release  Bilateral        SOCIAL HISTORY:  FAMILY HISTORY:  FH: stroke (Mother)    FH: type 1 diabetes (Mother)      ALLERGIES: 	  No Known Allergies          MEDICATIONS:  acetaminophen     Tablet .. 975 milliGRAM(s) Oral every 6 hours  ceFAZolin   IVPB 2000 milliGRAM(s) IV Intermittent every 8 hours  dextrose 5%. 1000 milliLiter(s) IV Continuous <Continuous>  dextrose 5%. 1000 milliLiter(s) IV Continuous <Continuous>  dextrose 50% Injectable 25 Gram(s) IV Push once  dextrose 50% Injectable 12.5 Gram(s) IV Push once  dextrose 50% Injectable 25 Gram(s) IV Push once  dextrose Oral Gel 15 Gram(s) Oral once PRN  enoxaparin Injectable 40 milliGRAM(s) SubCutaneous every 24 hours  glucagon  Injectable 1 milliGRAM(s) IntraMuscular once  insulin lispro (ADMELOG) corrective regimen sliding scale   SubCutaneous three times a day before meals  insulin lispro (ADMELOG) corrective regimen sliding scale   SubCutaneous at bedtime  oxyCODONE    IR 5 milliGRAM(s) Oral every 4 hours PRN  oxyCODONE    IR 10 milliGRAM(s) Oral every 4 hours PRN  sodium chloride 0.9%. 1000 milliLiter(s) IV Continuous <Continuous>      T(C): 37.1 (25 @ 10:28), Max: 37.7 (25 @ 14:39)  HR: 88 (25 @ 10:28) (84 - 116)  BP: 138/63 (25 @ 10:28) (108/52 - 138/63)  RR: 17 (25 @ 10:28) (17 - 18)  SpO2: 100% (25 @ 10:28) (92% - 100%)    25 @ 07:01  -  25 @ 07:00  --------------------------------------------------------  IN: 1875 mL / OUT: 2080 mL / NET: -205 mL    25 @ 07:01  -  25 @ 11:41  --------------------------------------------------------  IN: 75 mL / OUT: 750 mL / NET: -675 mL        PHYSICAL EXAM:    Constitutional: resting comfortably in bed; NAD  HEENT: NC/AT, PERRL, EOMI, anicteric sclera, no nasal discharge; uvula midline, no oropharyngeal erythema or exudates; MMM  Neck: supple; no thyromegaly, JVP ? cm H20, JVD +/-  Respiratory: CTA B/L; no W/R/R, no retractions  Cardiac: +S1/S2; RRR; no M/R/G; PMI non-displaced  Gastrointestinal: soft, NT/ND; no rebound or guarding; +BSx4  Extremities: WWP, no clubbing or cyanosis; no peripheral edema  Musculoskeletal: NROM x4; no joint swelling, tenderness or erythema  Vascular: 2+ radial, DP/PT pulses B/L  Dermatologic: skin warm, dry and intact; no rashes, wounds, or scars  Lymphatic: no submandibular or cervical LAD  Neurologic: AAOx3; CNII-XII grossly intact; no focal deficits        I&O's Summary    2025 07:  -  2025 07:00  --------------------------------------------------------  IN: 1875 mL / OUT: 2080 mL / NET: -205 mL    2025 07:  -  2025 11:41  --------------------------------------------------------  IN: 75 mL / OUT: 750 mL / NET: -675 mL        LABS:	 	                        7.5    9.47  )-----------( 159      ( 2025 05:30 )             22.6     0630    141  |  110[H]  |  7   ----------------------------<  147[H]  3.5   |  24  |  0.60    Ca    7.5[L]      2025 05:30  Phos  4.3     -28  Mg     1.5               proBNP:   Lipid Profile:   HgA1c:   TSH:    Cardiology Consult    Subjective:    Interval History:  Pt seen and examined at bedside, NAD. Reports that she can feel her pulse in her ear, states that this started acutely after her second of unit of blood during this admission. Patient has very briefly experienced his pulsatile sensation in her ears intermittently in the past, however it has never been persistent until now. Denies any other symptoms including chest pain/discomfort/pressure, SOB, lightheadedness, dizziness. She has not gotten out of bed this admission and is eager to work with PT.    Telemetry:  HR 80-90s NSR, no tele events    Review of Systems:  See above HPI for Review of Systems    OBJECTIVE  T(C): 36.9 (06-30-25 @ 13:57), Max: 37.7 (06-29-25 @ 14:39)  HR: 84 (06-30-25 @ 13:14) (82 - 116)  BP: 115/55 (06-30-25 @ 13:14) (115/54 - 141/62)  RR: 16 (06-30-25 @ 13:14) (16 - 18)  SpO2: 95% (06-30-25 @ 13:14) (92% - 100%)    06-29-25 @ 07:01  -  06-30-25 @ 07:00  --------------------------------------------------------  IN: 1875 mL / OUT: 2080 mL / NET: -205 mL    06-30-25 @ 07:01  -  06-30-25 @ 13:59  --------------------------------------------------------  IN: 687 mL / OUT: 1185 mL / NET: -498 mL        PHYSICAL EXAM:    Constitutional: resting comfortably in bed; NAD  HEENT: NC/AT, PERRL, EOMI, MMM  Neck: + bounding carotids   Respiratory: CTA B/L  Cardiac: +S1/S2; RRR; no M/R/G; PMI non-displaced  Gastrointestinal: large abdominal binder placed over entire abdomen  Extremities: WWP, SCDs in place  Musculoskeletal: no joint swelling, tenderness or erythema  Vascular: 2+ radial, DP/PT pulses B/L  Dermatologic: skin warm, dry and intact; no rashes, wounds, or scars  Neurologic: AAOx3; no focal deficits    LABS:                        7.5    9.47  )-----------( 159      ( 30 Jun 2025 05:30 )             22.6     06-30    141  |  110[H]  |  7   ----------------------------<  147[H]  3.5   |  24  |  0.60    Ca    7.5[L]      30 Jun 2025 05:30        Urinalysis Basic - ( 30 Jun 2025 05:30 )    Color: x / Appearance: x / SG: x / pH: x  Gluc: 147 mg/dL / Ketone: x  / Bili: x / Urobili: x   Blood: x / Protein: x / Nitrite: x   Leuk Esterase: x / RBC: x / WBC x   Sq Epi: x / Non Sq Epi: x / Bacteria: x        >>> <<<  RADIOLOGY & ADDITIONAL TESTS:  Reviewed .    MEDICATIONS  (STANDING):  acetaminophen     Tablet .. 975 milliGRAM(s) Oral every 6 hours  ceFAZolin   IVPB 2000 milliGRAM(s) IV Intermittent every 8 hours  dextrose 5%. 1000 milliLiter(s) (50 mL/Hr) IV Continuous <Continuous>  dextrose 5%. 1000 milliLiter(s) (100 mL/Hr) IV Continuous <Continuous>  dextrose 50% Injectable 25 Gram(s) IV Push once  dextrose 50% Injectable 12.5 Gram(s) IV Push once  dextrose 50% Injectable 25 Gram(s) IV Push once  enoxaparin Injectable 40 milliGRAM(s) SubCutaneous every 24 hours  glucagon  Injectable 1 milliGRAM(s) IntraMuscular once  insulin lispro (ADMELOG) corrective regimen sliding scale   SubCutaneous three times a day before meals  insulin lispro (ADMELOG) corrective regimen sliding scale   SubCutaneous at bedtime  sodium chloride 0.9%. 1000 milliLiter(s) (75 mL/Hr) IV Continuous <Continuous>    MEDICATIONS  (PRN):  dextrose Oral Gel 15 Gram(s) Oral once PRN Blood Glucose LESS THAN 70 milliGRAM(s)/deciliter  oxyCODONE    IR 5 milliGRAM(s) Oral every 4 hours PRN Moderate Pain (4 - 6)  oxyCODONE    IR 10 milliGRAM(s) Oral every 4 hours PRN Severe Pain (7 - 10)

## 2025-07-01 ENCOUNTER — TRANSCRIPTION ENCOUNTER (OUTPATIENT)
Age: 60
End: 2025-07-01

## 2025-07-01 VITALS — TEMPERATURE: 98 F

## 2025-07-01 LAB
HCT VFR BLD CALC: 26.7 % — LOW (ref 34.5–45)
HGB BLD-MCNC: 9.1 G/DL — LOW (ref 11.5–15.5)
MCHC RBC-ENTMCNC: 30 PG — SIGNIFICANT CHANGE UP (ref 27–34)
MCHC RBC-ENTMCNC: 34.1 G/DL — SIGNIFICANT CHANGE UP (ref 32–36)
MCV RBC AUTO: 88.1 FL — SIGNIFICANT CHANGE UP (ref 80–100)
NRBC # BLD AUTO: 0 K/UL — SIGNIFICANT CHANGE UP (ref 0–0)
NRBC # FLD: 0 K/UL — SIGNIFICANT CHANGE UP (ref 0–0)
NRBC BLD AUTO-RTO: 0 /100 WBCS — SIGNIFICANT CHANGE UP (ref 0–0)
PLATELET # BLD AUTO: 219 K/UL — SIGNIFICANT CHANGE UP (ref 150–400)
PMV BLD: 10.9 FL — SIGNIFICANT CHANGE UP (ref 7–13)
RBC # BLD: 3.03 M/UL — LOW (ref 3.8–5.2)
RBC # FLD: 14.2 % — SIGNIFICANT CHANGE UP (ref 10.3–14.5)
TRANSFUSION REACTION INTERP 2: NEGATIVE — SIGNIFICANT CHANGE UP
WBC # BLD: 10.44 K/UL — SIGNIFICANT CHANGE UP (ref 3.8–10.5)
WBC # FLD AUTO: 10.44 K/UL — SIGNIFICANT CHANGE UP (ref 3.8–10.5)

## 2025-07-01 PROCEDURE — 74174 CTA ABD&PLVS W/CONTRAST: CPT

## 2025-07-01 PROCEDURE — 97161 PT EVAL LOW COMPLEX 20 MIN: CPT

## 2025-07-01 PROCEDURE — 82803 BLOOD GASES ANY COMBINATION: CPT

## 2025-07-01 PROCEDURE — 84100 ASSAY OF PHOSPHORUS: CPT

## 2025-07-01 PROCEDURE — 83036 HEMOGLOBIN GLYCOSYLATED A1C: CPT

## 2025-07-01 PROCEDURE — 36415 COLL VENOUS BLD VENIPUNCTURE: CPT

## 2025-07-01 PROCEDURE — 86901 BLOOD TYPING SEROLOGIC RH(D): CPT

## 2025-07-01 PROCEDURE — 85730 THROMBOPLASTIN TIME PARTIAL: CPT

## 2025-07-01 PROCEDURE — 86923 COMPATIBILITY TEST ELECTRIC: CPT

## 2025-07-01 PROCEDURE — 82962 GLUCOSE BLOOD TEST: CPT

## 2025-07-01 PROCEDURE — 93970 EXTREMITY STUDY: CPT

## 2025-07-01 PROCEDURE — 85610 PROTHROMBIN TIME: CPT

## 2025-07-01 PROCEDURE — 86850 RBC ANTIBODY SCREEN: CPT

## 2025-07-01 PROCEDURE — 86900 BLOOD TYPING SEROLOGIC ABO: CPT

## 2025-07-01 PROCEDURE — 82330 ASSAY OF CALCIUM: CPT

## 2025-07-01 PROCEDURE — 93306 TTE W/DOPPLER COMPLETE: CPT

## 2025-07-01 PROCEDURE — 80048 BASIC METABOLIC PNL TOTAL CA: CPT

## 2025-07-01 PROCEDURE — 81003 URINALYSIS AUTO W/O SCOPE: CPT

## 2025-07-01 PROCEDURE — 83735 ASSAY OF MAGNESIUM: CPT

## 2025-07-01 PROCEDURE — 86922 COMPATIBILITY TEST ANTIGLOB: CPT

## 2025-07-01 PROCEDURE — 85027 COMPLETE CBC AUTOMATED: CPT

## 2025-07-01 PROCEDURE — 85025 COMPLETE CBC W/AUTO DIFF WBC: CPT

## 2025-07-01 PROCEDURE — 36430 TRANSFUSION BLD/BLD COMPNT: CPT

## 2025-07-01 PROCEDURE — 84295 ASSAY OF SERUM SODIUM: CPT

## 2025-07-01 PROCEDURE — 99291 CRITICAL CARE FIRST HOUR: CPT

## 2025-07-01 PROCEDURE — 84132 ASSAY OF SERUM POTASSIUM: CPT

## 2025-07-01 PROCEDURE — 99232 SBSQ HOSP IP/OBS MODERATE 35: CPT

## 2025-07-01 PROCEDURE — P9016: CPT

## 2025-07-01 PROCEDURE — 83605 ASSAY OF LACTIC ACID: CPT

## 2025-07-01 RX ORDER — FUROSEMIDE 10 MG/ML
20 INJECTION INTRAMUSCULAR; INTRAVENOUS ONCE
Refills: 0 | Status: DISCONTINUED | OUTPATIENT
Start: 2025-07-01 | End: 2025-07-01

## 2025-07-01 RX ADMIN — Medication 975 MILLIGRAM(S): at 11:40

## 2025-07-01 RX ADMIN — Medication 975 MILLIGRAM(S): at 06:10

## 2025-07-01 RX ADMIN — OXYCODONE HYDROCHLORIDE 10 MILLIGRAM(S): 30 TABLET ORAL at 04:45

## 2025-07-01 RX ADMIN — INSULIN LISPRO 2: 100 INJECTION, SOLUTION INTRAVENOUS; SUBCUTANEOUS at 11:04

## 2025-07-01 RX ADMIN — Medication 975 MILLIGRAM(S): at 11:07

## 2025-07-01 RX ADMIN — ENOXAPARIN SODIUM 40 MILLIGRAM(S): 100 INJECTION SUBCUTANEOUS at 11:07

## 2025-07-01 RX ADMIN — Medication 975 MILLIGRAM(S): at 00:00

## 2025-07-01 RX ADMIN — OXYCODONE HYDROCHLORIDE 5 MILLIGRAM(S): 30 TABLET ORAL at 11:40

## 2025-07-01 RX ADMIN — OXYCODONE HYDROCHLORIDE 10 MILLIGRAM(S): 30 TABLET ORAL at 04:06

## 2025-07-01 RX ADMIN — OXYCODONE HYDROCHLORIDE 5 MILLIGRAM(S): 30 TABLET ORAL at 11:07

## 2025-07-01 RX ADMIN — Medication 975 MILLIGRAM(S): at 06:44

## 2025-07-01 RX ADMIN — Medication 100 MILLIGRAM(S): at 06:10

## 2025-07-01 NOTE — DISCHARGE NOTE NURSING/CASE MANAGEMENT/SOCIAL WORK - FINANCIAL ASSISTANCE
[facility] provides services at a reduced cost to those who are determined to be eligible through [facility]’s financial assistance program. Information regarding [facility]’s financial assistance program can be found by going to [email or web address] or by calling [phone]. cane/yes

## 2025-07-01 NOTE — DISCHARGE NOTE PROVIDER - NSDCFUADDINST_GEN_ALL_CORE_FT
Wear binder. Follow instructions as provided. Take medications as prescribed. Follow up with Dr. Hannah in his office, call to confirm appointment Wear binder. Follow instructions as provided. Take medications as prescribed. Follow up with Dr. Hannah in his office, call to confirm appointment.    Follow up with cardiologist and PCP.

## 2025-07-01 NOTE — PHYSICAL THERAPY INITIAL EVALUATION ADULT - PERTINENT HX OF CURRENT PROBLEM, REHAB EVAL
58 yo F w PMH of DM2, from [facility] s/p panniculectomy, had syncopal episodes in PACU, downtrending H/H. Pt was increasingly hypotensive. [facility] Called Dr. Hannah who had pt transferred. In ED, BP began to normalized. H/H currently stable from latest at [facility].

## 2025-07-01 NOTE — PHYSICAL THERAPY INITIAL EVALUATION ADULT - GENERAL OBSERVATIONS, REHAB EVAL
Pt received semi supine in bed in NAD, +tele, +pulse ox, +MORALES x 2, +HEP lock, +RN Margi cleared Pt for PT treatment session. Pt is AO x 4 and agreeable to participate in PT session.

## 2025-07-01 NOTE — PROGRESS NOTE ADULT - ATTENDING COMMENTS
Please see attending attestation to Cardiology consult note on 6/29
Patient is a 60 yo Female with Pmhx of DM, vertebral artery stenosis, breast cancer (dx 2019, s/p RT and chemo completed 6 months ago) s/p panniculectomy 6/27th at [facility] c/b episode of syncope in the setting of hypotension and acute anemia, found to have abdominal Hematoma with no active bleeding. Cardiology was consulted for Abnormal EKG evaluation  - Patient seen and examined at bedside, all data reviewed  - Prior to Hospitalization patient reported MARTINEZ with stairs and significant exertion. Chemo Regimen for Breast CA is unknown  - Tele showed Sinus tach last on 06/28. Otherwise patient has remained in NSR with HR in the 80-90's range without tachy or bradyarrhythmias noted  - ECG reviewed showing sinus bradycardia with non specific ST changes    - During hospitalization patient experienced near syncopal event, likely in setting of Acute anemia and orthostatic Hypotension, with lowest Hg of 7.5  from baseline of 13. She has been resuscitated sufficiently and Hg is now back to 9.1 and stable  - Clinically she is warm, well perfused and compensated.  - Echo reviewed showing normal BIV function without significant valvular heart and elevated Left sided filling pressures  - A1C of 7.4; Please add on TSH and lipid profile to am labs  - Would diurese today with Lasix 20 IVP  - Had long discussion with Patient regarding importance of Diabetes and weight loss management and close cardiology follow up. She would like to follow up with her PCP in [place] and have a referral with their cardiology team there.  - Patient also reporting pulsatile tinnitus and being evaluated by Neuro, she will have outpatient brain MRI for further assessment  - Cardiology will cont to follow with you, please call with any questions .

## 2025-07-01 NOTE — DISCHARGE NOTE PROVIDER - NSDCCPCAREPLAN_GEN_ALL_CORE_FT
PRINCIPAL DISCHARGE DIAGNOSIS  Diagnosis: Postoperative hypotension  Assessment and Plan of Treatment:       SECONDARY DISCHARGE DIAGNOSES  Diagnosis: Syncope  Assessment and Plan of Treatment:

## 2025-07-01 NOTE — DISCHARGE NOTE PROVIDER - PROVIDER TOKENS
PROVIDER:[TOKEN:[[id number]]] PROVIDER:[TOKEN:[[id number]]],PROVIDER:[TOKEN:[[id number]],FOLLOWUP:[1 week]]

## 2025-07-01 NOTE — PHYSICAL THERAPY INITIAL EVALUATION ADULT - GAIT DEVIATIONS NOTED, PT EVAL
forward flexed posture, generally steady gait without LOB or knee buckling/decreased ronnie/decreased step length

## 2025-07-01 NOTE — PROGRESS NOTE ADULT - SUBJECTIVE AND OBJECTIVE BOX
Plastic Surgery Progress Note (pg [facility]: [id number], [facility]: [phone])    SUBJECTIVE  The patient was seen and examined. passed TOV  OBJECTIVE  ___________________________________________________  VITAL SIGNS / I&O's   Vital Signs Last 24 Hrs  T(C): 36.9 (01 Jul 2025 05:04), Max: 37.2 (30 Jun 2025 10:00)  T(F): 98.5 (01 Jul 2025 05:04), Max: 98.9 (30 Jun 2025 10:00)  HR: 82 (01 Jul 2025 04:00) (82 - 101)  BP: 119/57 (01 Jul 2025 04:00) (111/59 - 141/62)  BP(mean): 82 (01 Jul 2025 04:00) (78 - 91)  RR: 17 (01 Jul 2025 04:00) (16 - 18)  SpO2: 100% (01 Jul 2025 04:00) (95% - 100%)    Parameters below as of 01 Jul 2025 04:00  Patient On (Oxygen Delivery Method): nasal cannula  O2 Flow (L/min): 1        30 Jun 2025 07:01  -  01 Jul 2025 07:00  --------------------------------------------------------  IN:    Oral Fluid: 948 mL    PRBCs (Packed Red Blood Cells): 300 mL    sodium chloride 0.9%: 750 mL  Total IN: 1998 mL    OUT:    Drain (mL): 55 mL    Drain (mL): 65 mL    Indwelling Catheter - Urethral (mL): 2425 mL    Voided (mL): 0 mL  Total OUT: 2545 mL    Total NET: -547 mL        ___________________________________________________  PHYSICAL EXAM    -- CONSTITUTIONAL: NAD, lying in bed  -- NEURO: Awake, alert    -- ABDOMEN:   soft, no obvious collections  incision cdi  R drain ss, L with some liquifying hematoma    ___________________________________________________  LABS                        9.1    10.44 )-----------( 219      ( 01 Jul 2025 06:54 )             26.7     30 Jun 2025 05:30    141    |  110    |  7      ----------------------------<  147    3.5     |  24     |  0.60     Ca    7.5        30 Jun 2025 05:30        CAPILLARY BLOOD GLUCOSE      POCT Blood Glucose.: 141 mg/dL (01 Jul 2025 06:30)  POCT Blood Glucose.: 184 mg/dL (30 Jun 2025 22:16)  POCT Blood Glucose.: 122 mg/dL (30 Jun 2025 17:26)  POCT Blood Glucose.: 174 mg/dL (30 Jun 2025 10:58)        Urinalysis Basic - ( 30 Jun 2025 05:30 )    Color: x / Appearance: x / SG: x / pH: x  Gluc: 147 mg/dL / Ketone: x  / Bili: x / Urobili: x   Blood: x / Protein: x / Nitrite: x   Leuk Esterase: x / RBC: x / WBC x   Sq Epi: x / Non Sq Epi: x / Bacteria: x      ___________________________________________________  MICRO  Recent Cultures:    ___________________________________________________  MEDICATIONS  (STANDING):  acetaminophen     Tablet .. 975 milliGRAM(s) Oral every 6 hours  ceFAZolin   IVPB 2000 milliGRAM(s) IV Intermittent every 8 hours  dextrose 5%. 1000 milliLiter(s) (50 mL/Hr) IV Continuous <Continuous>  dextrose 5%. 1000 milliLiter(s) (100 mL/Hr) IV Continuous <Continuous>  dextrose 50% Injectable 25 Gram(s) IV Push once  dextrose 50% Injectable 12.5 Gram(s) IV Push once  dextrose 50% Injectable 25 Gram(s) IV Push once  enoxaparin Injectable 40 milliGRAM(s) SubCutaneous every 24 hours  glucagon  Injectable 1 milliGRAM(s) IntraMuscular once  insulin lispro (ADMELOG) corrective regimen sliding scale   SubCutaneous three times a day before meals  insulin lispro (ADMELOG) corrective regimen sliding scale   SubCutaneous at bedtime    MEDICATIONS  (PRN):  dextrose Oral Gel 15 Gram(s) Oral once PRN Blood Glucose LESS THAN 70 milliGRAM(s)/deciliter  oxyCODONE    IR 5 milliGRAM(s) Oral every 4 hours PRN Moderate Pain (4 - 6)  oxyCODONE    IR 10 milliGRAM(s) Oral every 4 hours PRN Severe Pain (7 - 10)

## 2025-07-01 NOTE — PROGRESS NOTE ADULT - SUBJECTIVE AND OBJECTIVE BOX
Cardiology Consult    Subjective:     Interval History:  Pt seen and examined at bedside, NAD. Still reporting pusatile sensation in ear, however has no other complaints. Tolerating abdominal binder and pain is well controlled. Patient has been up and out of bed walking with PT yesterday and today with no symptoms of       Review of Systems:  See above HPI for Review of Systems    OBJECTIVE  T(C): 36.9 (07-01-25 @ 08:58), Max: 37.1 (06-30-25 @ 18:17)  HR: 72 (07-01-25 @ 12:15) (72 - 101)  BP: 124/64 (07-01-25 @ 12:15) (111/59 - 134/61)  RR: 17 (07-01-25 @ 12:15) (16 - 18)  SpO2: 100% (07-01-25 @ 04:00) (95% - 100%)    06-30-25 @ 07:01  -  07-01-25 @ 07:00  --------------------------------------------------------  IN: 1998 mL / OUT: 3145 mL / NET: -1147 mL    07-01-25 @ 07:01  -  07-01-25 @ 13:50  --------------------------------------------------------  IN: 0 mL / OUT: 50 mL / NET: -50 mL        PHYSICAL EXAM:  Constitutional: resting comfortably in bed; NAD  HEENT: NC/AT, PERRL, EOMI, MMM  Neck: + bounding carotids   Respiratory: CTA B/L  Cardiac: +S1/S2; RRR; no M/R/G; PMI non-displaced  Gastrointestinal: large abdominal binder placed over entire abdomen  Extremities: WWP, SCDs in place  Musculoskeletal: no joint swelling, tenderness or erythema  Vascular: 2+ radial, DP/PT pulses B/L  Dermatologic: skin warm, dry and intact; no rashes, wounds, or scars  Neurologic: AAOx3; no focal deficits    LABS:                        9.1    10.44 )-----------( 219      ( 01 Jul 2025 06:54 )             26.7     06-30    141  |  110[H]  |  7   ----------------------------<  147[H]  3.5   |  24  |  0.60    Ca    7.5[L]      30 Jun 2025 05:30        Urinalysis Basic - ( 30 Jun 2025 05:30 )    Color: x / Appearance: x / SG: x / pH: x  Gluc: 147 mg/dL / Ketone: x  / Bili: x / Urobili: x   Blood: x / Protein: x / Nitrite: x   Leuk Esterase: x / RBC: x / WBC x   Sq Epi: x / Non Sq Epi: x / Bacteria: x        >>> <<<  RADIOLOGY & ADDITIONAL TESTS:  Reviewed .    MEDICATIONS  (STANDING):  acetaminophen     Tablet .. 975 milliGRAM(s) Oral every 6 hours  ceFAZolin   IVPB 2000 milliGRAM(s) IV Intermittent every 8 hours  dextrose 5%. 1000 milliLiter(s) (50 mL/Hr) IV Continuous <Continuous>  dextrose 5%. 1000 milliLiter(s) (100 mL/Hr) IV Continuous <Continuous>  dextrose 50% Injectable 25 Gram(s) IV Push once  dextrose 50% Injectable 12.5 Gram(s) IV Push once  dextrose 50% Injectable 25 Gram(s) IV Push once  enoxaparin Injectable 40 milliGRAM(s) SubCutaneous every 24 hours  glucagon  Injectable 1 milliGRAM(s) IntraMuscular once  insulin lispro (ADMELOG) corrective regimen sliding scale   SubCutaneous three times a day before meals  insulin lispro (ADMELOG) corrective regimen sliding scale   SubCutaneous at bedtime    MEDICATIONS  (PRN):  dextrose Oral Gel 15 Gram(s) Oral once PRN Blood Glucose LESS THAN 70 milliGRAM(s)/deciliter  oxyCODONE    IR 5 milliGRAM(s) Oral every 4 hours PRN Moderate Pain (4 - 6)  oxyCODONE    IR 10 milliGRAM(s) Oral every 4 hours PRN Severe Pain (7 - 10)

## 2025-07-01 NOTE — DISCHARGE NOTE NURSING/CASE MANAGEMENT/SOCIAL WORK - PATIENT PORTAL LINK FT
You can access the FollowMyHealth Patient Portal offered by [facility] by registering at the following website: [email or web address]. By joining [facility]’s FollowMyHealth portal, you will also be able to view your health information using other applications (apps) compatible with our system.

## 2025-07-01 NOTE — DISCHARGE NOTE PROVIDER - NSDCMRMEDTOKEN_GEN_ALL_CORE_FT
anastrozole 1 mg oral tablet: 1 tab(s) orally once a day (at bedtime)  atorvastatin 10 mg oral tablet: 1 tab(s) orally once a day (at bedtime)  Flonase 50 mcg/inh nasal spray: 1 spray(s) nasal once a day  metFORMIN 1000 mg oral tablet: orally 2 times a day  Mounjaro 7.5 mg/0.5 mL subcutaneous solution: 7.5 milligram(s) subcutaneously  Vitamin D3 25 mcg (1000 intl units) oral tablet: 1 tab(s) orally once a day

## 2025-07-01 NOTE — PROGRESS NOTE ADULT - PROVIDER SPECIALTY LIST ADULT
Cardiology
Plastic Surgery
Cardiology
Plastic Surgery

## 2025-07-01 NOTE — DISCHARGE NOTE PROVIDER - HOSPITAL COURSE
Patient admitted to [facility] on 6/28. The patient had a panniculectomy performed in the OR on 6/28, complicated by hematoma. The patient was admitted for hemodynamic monitoring, and received several blood transfusions. She also received a US for DVT ruleout.  The patient's pain was controlled by IV pain medications transitioned to po medications without issue. The patient was advanced to regular diet and tolerated it well. The patient was hemodynamically stable, placed on home medications, and discharged with instructions to follow up with Dr. Coelho in 1 week and had no other issues. Patient admitted to [facility] on 6/28 as transfer from [facility]. The patient had a panniculectomy performed in the OR on 6/28, complicated by hematoma. The patient was admitted for hemodynamic monitoring, and received several blood transfusions. based on this, she likely had brief hypovolemic shock and was responsive. She also received a US for DVT ruleout.  The patient's pain was controlled by IV pain medications transitioned to po medications without issue. The patient was advanced to regular diet and tolerated it well. The patient was hemodynamically stable, placed on home medications, and discharged with instructions to follow up with Dr. Coelho in 1 week and had no other issues.

## 2025-07-01 NOTE — DISCHARGE NOTE PROVIDER - CARE PROVIDERS DIRECT ADDRESSES
[email or web address]

## 2025-07-01 NOTE — DISCHARGE NOTE PROVIDER - CARE PROVIDER_API CALL
Meliton Hannah  Plastic Surgery  [address] [place] [address]  Phone: [phone]  Fax: [phone]  Follow Up Time:    Meliton Hannah  Plastic Surgery  [address] [place] [address]  Phone: [phone]  Fax: [phone]  Follow Up Time:     Javier Kolb  Neurology  [address] [address]  Phone: [phone]  Fax: [phone]  Follow Up Time: 1 week

## 2025-07-01 NOTE — PROGRESS NOTE ADULT - ASSESSMENT
Patient is s/p panniculectomy 6/27. Concern of hematoma in the left abdomen. However exam not that concerning warranting exploration. She has hx of syncopal episodes in the past.     Plan  - fu cbc  - medicine consult for DM  - PT consult  - TTE  - cards/neuro recs appreciated  - pain control  - lovenox  - IVF    Eliseo Fontenot MD  Plastic and Reconstructive Surgery, PGY4  Available on Microsoft Teams  [facility]: [id number], NS: [phone] [facility]: Green Team [address] [facility]: [id number] 
Patient is s/p panniculectomy. Concern of hematoma in the left abdomen. However exam not that concerning warranting exploration. She has hx of syncopal episodes in the past.   - PT consult to get her OOB with assistance  - Neuro consult for syncopy  - Cardiology consult for some concerning changes on the EKG  - Duplex of LE  - Lower fluids to 75  - Regular diet  - Lovenox ppx
60 yo female with history of HLD, DM, vertebral artery stenosis, breast cancer (dx 2019, s/p RT and chemo completed 6 months ago) s/p panniculectomy 6/27th at [facility] c/b episode of syncope in the setting of hypotension and acute anemia. Transferred to [facility]. CTAP with fluid collection in the abdomen, likely hematoma without active bleeding. She received IVF and 1 U PRBC. Course c/b pre-syncopal event 6/29 thought to be also 2/2 orthostatic hypotension. Second unit PRBC discontinued after possible transfusion reaction. Cardiology consulted for concern for an abnormal EKG, however pt with normal EKG, no tele findings. Likely i/s/o acute blood loss anemia, however cannot exclude cardiac structural etiology.    Review of studies:   EKG 6/26/25: Sinus bradycardia NSST   EKG 6/28: NSR NSST   TTE 6/30: EF 55-60%, dilated IVC with abnormal inspiratory collapse      #c/f abnormal EKG   #syncope/pre-syncope likely 2/2 acute anemia and orthostatic hypotension possibly exacerbated by vertebral artery stenosis  - there is no arrythmia or abnormality on ekg or telemetry that would explain current syncopal episodes, more likely iso acute blood loss anemia  - exam notable for murmur at lSB and bounding carotids, TTE showing dilated IVC, recommend lasix 20mg IVP x1 prior to discharge  - patient will need outpatient preventative cardiology follow up, please give her printed copy of TTE results to bring with her to appointment    Preliminary recommendations. Please see attending attestation for final recommendations
60 yo female with history of HLD, DM, vertebral artery stenosis, breast cancer (dx 2019, s/p RT and chemo completed 6 months ago) s/p panniculectomy 6/27th at [facility] c/b episode of syncope in the setting of hypotension and acute anemia. Transferred to [facility]. CTAP with fluid collection in the abdomen, likely hematoma without active bleeding. She received IVF and 1 U PRBC. Course c/b pre-syncopal event 6/29 thought to be also 2/2 orthostatic hypotension. Second unit PRBC discontinued after possible transfusion reaction. Cardiology consulted for concern for an abnormal EKG, however pt with normal EKG, no tele findings. Likely i/s/o acute blood loss anemia, however cannot exclude cardiac structural etiology.    Review of studies:   EKG 6/26/25: Sinus bradycardia NSST   EKG 6/28: NSR NSST   telemetry reviewed- on initial admission, hr in 80s, had been uptrending to 110 in sinus tachycardia, however improving and stable in 80-90 range    #c/f abnormal EKG   #syncope/pre-syncope likely 2/2 acute anemia and orthostatic hypotension possibly exacerbated by vertebral artery stenosis, PE remains on the differential   - there is no arrythmia or abnormality on ekg or telemetry that would explain current syncopal episodes, however would continue to monitor on tele   - please obtain orthostatic vitals when able  - exam notable for murmur at lSB and bounding carotids, f/u TTE to assess valvular function  - AC was held in the setting of anemia, restarted -- Wells score 3 (moderate risk), consider CTA to r/o PE  - management of acute anemia/IVF per primary surgical team, monitor fluid status to avoid overload    Preliminary recommendations. Please see attending attestation for final recommendations
Patient is s/p panniculectomy 6/27. Concern of hematoma in the left abdomen. However exam not that concerning warranting exploration. She has hx of syncopal episodes in the past.     Plan  - fu cbc  - TTE and duplex negative  - cards/neuro recs appreciated  - pain control  - lovenox  - ambulation  - likely dc today    Eliseo Fontenot MD  Plastic and Reconstructive Surgery, PGY5  Available on Microsoft Teams  [facility]: [id number], NS: [phone] [facility]: Green Team [address] [facility]: [id number] 
Patient is s/p panniculectomy now admitted for concern of low BP, low Hb. On exam it is hard to assess overt bleeding given the large surface area of undermined surgical dissection. she is soft however and drain outputs have been acceptable. However her low BP is concerning in the setting of multiple boluses. Discuss plans with Dr. Hannah and await further recommendations.

## 2025-07-01 NOTE — PHYSICAL THERAPY INITIAL EVALUATION ADULT - ADDITIONAL COMMENTS
Pt lives with her spouse and family in a private home. She states that she has steps up to her room in the home, but will be staying with her daughter on the first floor upon discharge from [facility]. She reports being independent at baseline for functional mobility and ADLs without use of AD/DME. Pt has access to a tub shower with no shower chair or grab bars. She does not own any DME. Pt works as an .

## 2025-07-01 NOTE — DISCHARGE NOTE PROVIDER - NSDCFUADDAPPT_GEN_ALL_CORE_FT
Please follow up with PCP in 1-2 weeks and get referral for cardiologist.   Must see cardiologist in 2 weeks.

## 2025-07-05 DIAGNOSIS — E11.9 TYPE 2 DIABETES MELLITUS WITHOUT COMPLICATIONS: ICD-10-CM

## 2025-07-05 DIAGNOSIS — L90.5 SCAR CONDITIONS AND FIBROSIS OF SKIN: ICD-10-CM

## 2025-07-05 DIAGNOSIS — K43.9 VENTRAL HERNIA WITHOUT OBSTRUCTION OR GANGRENE: ICD-10-CM

## 2025-07-05 DIAGNOSIS — E65 LOCALIZED ADIPOSITY: ICD-10-CM

## 2025-07-05 DIAGNOSIS — L98.7 EXCESSIVE AND REDUNDANT SKIN AND SUBCUTANEOUS TISSUE: ICD-10-CM

## 2025-07-05 DIAGNOSIS — Z79.84 LONG TERM (CURRENT) USE OF ORAL HYPOGLYCEMIC DRUGS: ICD-10-CM

## 2025-07-07 DIAGNOSIS — R57.1 HYPOVOLEMIC SHOCK: ICD-10-CM

## 2025-07-07 DIAGNOSIS — Z79.899 OTHER LONG TERM (CURRENT) DRUG THERAPY: ICD-10-CM

## 2025-07-07 DIAGNOSIS — E11.9 TYPE 2 DIABETES MELLITUS WITHOUT COMPLICATIONS: ICD-10-CM

## 2025-07-07 DIAGNOSIS — L76.31 POSTPROCEDURAL HEMATOMA OF SKIN AND SUBCUTANEOUS TISSUE FOLLOWING A DERMATOLOGIC PROCEDURE: ICD-10-CM

## 2025-07-07 DIAGNOSIS — R00.0 TACHYCARDIA, UNSPECIFIED: ICD-10-CM

## 2025-07-07 DIAGNOSIS — Z85.3 PERSONAL HISTORY OF MALIGNANT NEOPLASM OF BREAST: ICD-10-CM

## 2025-07-07 DIAGNOSIS — D62 ACUTE POSTHEMORRHAGIC ANEMIA: ICD-10-CM

## 2025-07-07 DIAGNOSIS — R55 SYNCOPE AND COLLAPSE: ICD-10-CM

## 2025-07-07 DIAGNOSIS — Z79.85 LONG-TERM (CURRENT) USE OF INJECTABLE NON-INSULIN ANTIDIABETIC DRUGS: ICD-10-CM

## 2025-07-07 DIAGNOSIS — M85.80 OTHER SPECIFIED DISORDERS OF BONE DENSITY AND STRUCTURE, UNSPECIFIED SITE: ICD-10-CM

## 2025-07-07 DIAGNOSIS — E55.9 VITAMIN D DEFICIENCY, UNSPECIFIED: ICD-10-CM

## 2025-07-07 DIAGNOSIS — E86.0 DEHYDRATION: ICD-10-CM

## 2025-07-07 DIAGNOSIS — I95.81 POSTPROCEDURAL HYPOTENSION: ICD-10-CM

## 2025-07-07 DIAGNOSIS — Z79.84 LONG TERM (CURRENT) USE OF ORAL HYPOGLYCEMIC DRUGS: ICD-10-CM

## 2025-07-07 DIAGNOSIS — E78.5 HYPERLIPIDEMIA, UNSPECIFIED: ICD-10-CM
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
## 2025-08-01 ENCOUNTER — EMERGENCY (EMERGENCY)
Facility: HOSPITAL | Age: 60
LOS: 1 days | End: 2025-08-01
Attending: EMERGENCY MEDICINE
Payer: COMMERCIAL

## 2025-08-01 VITALS
DIASTOLIC BLOOD PRESSURE: 66 MMHG | HEART RATE: 75 BPM | RESPIRATION RATE: 16 BRPM | SYSTOLIC BLOOD PRESSURE: 101 MMHG | OXYGEN SATURATION: 99 %

## 2025-08-01 VITALS
SYSTOLIC BLOOD PRESSURE: 145 MMHG | WEIGHT: 171.96 LBS | OXYGEN SATURATION: 99 % | HEART RATE: 77 BPM | DIASTOLIC BLOOD PRESSURE: 84 MMHG | HEIGHT: 64 IN | RESPIRATION RATE: 16 BRPM | TEMPERATURE: 98 F

## 2025-08-01 DIAGNOSIS — Z98.51 TUBAL LIGATION STATUS: Chronic | ICD-10-CM

## 2025-08-01 DIAGNOSIS — Z98.891 HISTORY OF UTERINE SCAR FROM PREVIOUS SURGERY: Chronic | ICD-10-CM

## 2025-08-01 DIAGNOSIS — Z98.890 OTHER SPECIFIED POSTPROCEDURAL STATES: Chronic | ICD-10-CM

## 2025-08-01 LAB
ALBUMIN SERPL ELPH-MCNC: 3.2 G/DL — LOW (ref 3.5–5)
ALP SERPL-CCNC: 90 U/L — SIGNIFICANT CHANGE UP (ref 40–120)
ALT FLD-CCNC: 15 U/L DA — SIGNIFICANT CHANGE UP (ref 10–60)
ANION GAP SERPL CALC-SCNC: 4 MMOL/L — LOW (ref 5–17)
AST SERPL-CCNC: 11 U/L — SIGNIFICANT CHANGE UP (ref 10–40)
BASOPHILS # BLD AUTO: 0.06 K/UL — SIGNIFICANT CHANGE UP (ref 0–0.2)
BASOPHILS NFR BLD AUTO: 0.8 % — SIGNIFICANT CHANGE UP (ref 0–2)
BILIRUB SERPL-MCNC: 0.2 MG/DL — SIGNIFICANT CHANGE UP (ref 0.2–1.2)
BLD GP AB SCN SERPL QL: SIGNIFICANT CHANGE UP
BUN SERPL-MCNC: 11 MG/DL — SIGNIFICANT CHANGE UP (ref 7–18)
CALCIUM SERPL-MCNC: 8.7 MG/DL — SIGNIFICANT CHANGE UP (ref 8.4–10.5)
CHLORIDE SERPL-SCNC: 109 MMOL/L — HIGH (ref 96–108)
CK SERPL-CCNC: 52 U/L — SIGNIFICANT CHANGE UP (ref 21–215)
CO2 SERPL-SCNC: 27 MMOL/L — SIGNIFICANT CHANGE UP (ref 22–31)
CREAT SERPL-MCNC: 0.6 MG/DL — SIGNIFICANT CHANGE UP (ref 0.5–1.3)
EGFR: 105 ML/MIN/1.73M2 — SIGNIFICANT CHANGE UP
EGFR: 105 ML/MIN/1.73M2 — SIGNIFICANT CHANGE UP
EOSINOPHIL # BLD AUTO: 0.24 K/UL — SIGNIFICANT CHANGE UP (ref 0–0.5)
EOSINOPHIL NFR BLD AUTO: 3.3 % — SIGNIFICANT CHANGE UP (ref 0–6)
GLUCOSE SERPL-MCNC: 133 MG/DL — HIGH (ref 70–99)
HCT VFR BLD CALC: 37.2 % — SIGNIFICANT CHANGE UP (ref 34.5–45)
HGB BLD-MCNC: 11.8 G/DL — SIGNIFICANT CHANGE UP (ref 11.5–15.5)
IMM GRANULOCYTES NFR BLD AUTO: 0.1 % — SIGNIFICANT CHANGE UP (ref 0–0.9)
LYMPHOCYTES # BLD AUTO: 1.99 K/UL — SIGNIFICANT CHANGE UP (ref 1–3.3)
LYMPHOCYTES # BLD AUTO: 27.5 % — SIGNIFICANT CHANGE UP (ref 13–44)
MAGNESIUM SERPL-MCNC: 2 MG/DL — SIGNIFICANT CHANGE UP (ref 1.6–2.6)
MCHC RBC-ENTMCNC: 28.5 PG — SIGNIFICANT CHANGE UP (ref 27–34)
MCHC RBC-ENTMCNC: 31.7 G/DL — LOW (ref 32–36)
MCV RBC AUTO: 89.9 FL — SIGNIFICANT CHANGE UP (ref 80–100)
MONOCYTES # BLD AUTO: 0.65 K/UL — SIGNIFICANT CHANGE UP (ref 0–0.9)
MONOCYTES NFR BLD AUTO: 9 % — SIGNIFICANT CHANGE UP (ref 2–14)
NEUTROPHILS # BLD AUTO: 4.29 K/UL — SIGNIFICANT CHANGE UP (ref 1.8–7.4)
NEUTROPHILS NFR BLD AUTO: 59.3 % — SIGNIFICANT CHANGE UP (ref 43–77)
NRBC BLD AUTO-RTO: 0 /100 WBCS — SIGNIFICANT CHANGE UP (ref 0–0)
PLATELET # BLD AUTO: 217 K/UL — SIGNIFICANT CHANGE UP (ref 150–400)
POTASSIUM SERPL-MCNC: 4 MMOL/L — SIGNIFICANT CHANGE UP (ref 3.5–5.3)
POTASSIUM SERPL-SCNC: 4 MMOL/L — SIGNIFICANT CHANGE UP (ref 3.5–5.3)
PROT SERPL-MCNC: 6.9 G/DL — SIGNIFICANT CHANGE UP (ref 6–8.3)
RBC # BLD: 4.14 M/UL — SIGNIFICANT CHANGE UP (ref 3.8–5.2)
RBC # FLD: 14.3 % — SIGNIFICANT CHANGE UP (ref 10.3–14.5)
SODIUM SERPL-SCNC: 140 MMOL/L — SIGNIFICANT CHANGE UP (ref 135–145)
WBC # BLD: 7.24 K/UL — SIGNIFICANT CHANGE UP (ref 3.8–10.5)
WBC # FLD AUTO: 7.24 K/UL — SIGNIFICANT CHANGE UP (ref 3.8–10.5)

## 2025-08-01 PROCEDURE — 85025 COMPLETE CBC W/AUTO DIFF WBC: CPT

## 2025-08-01 PROCEDURE — 86901 BLOOD TYPING SEROLOGIC RH(D): CPT

## 2025-08-01 PROCEDURE — 99284 EMERGENCY DEPT VISIT MOD MDM: CPT | Mod: 25

## 2025-08-01 PROCEDURE — 83735 ASSAY OF MAGNESIUM: CPT

## 2025-08-01 PROCEDURE — 93971 EXTREMITY STUDY: CPT

## 2025-08-01 PROCEDURE — 36415 COLL VENOUS BLD VENIPUNCTURE: CPT

## 2025-08-01 PROCEDURE — 82550 ASSAY OF CK (CPK): CPT

## 2025-08-01 PROCEDURE — 86850 RBC ANTIBODY SCREEN: CPT

## 2025-08-01 PROCEDURE — 80053 COMPREHEN METABOLIC PANEL: CPT

## 2025-08-01 PROCEDURE — 99284 EMERGENCY DEPT VISIT MOD MDM: CPT

## 2025-08-01 PROCEDURE — 93971 EXTREMITY STUDY: CPT | Mod: 26,RT

## 2025-08-01 PROCEDURE — 86900 BLOOD TYPING SEROLOGIC ABO: CPT

## 2025-08-01 PROCEDURE — 96374 THER/PROPH/DIAG INJ IV PUSH: CPT

## 2025-08-01 RX ORDER — KETOROLAC TROMETHAMINE 30 MG/ML
15 INJECTION, SOLUTION INTRAMUSCULAR; INTRAVENOUS ONCE
Refills: 0 | Status: DISCONTINUED | OUTPATIENT
Start: 2025-08-01 | End: 2025-08-01

## 2025-08-01 RX ADMIN — KETOROLAC TROMETHAMINE 15 MILLIGRAM(S): 30 INJECTION, SOLUTION INTRAMUSCULAR; INTRAVENOUS at 10:48

## 2025-08-07 ENCOUNTER — OUTPATIENT (OUTPATIENT)
Dept: OUTPATIENT SERVICES | Facility: HOSPITAL | Age: 60
LOS: 1 days | End: 2025-08-07

## 2025-08-07 ENCOUNTER — APPOINTMENT (OUTPATIENT)
Dept: ULTRASOUND IMAGING | Facility: IMAGING CENTER | Age: 60
End: 2025-08-07

## 2025-08-07 ENCOUNTER — OUTPATIENT (OUTPATIENT)
Dept: OUTPATIENT SERVICES | Facility: HOSPITAL | Age: 60
LOS: 1 days | End: 2025-08-07
Payer: COMMERCIAL

## 2025-08-07 DIAGNOSIS — Z98.51 TUBAL LIGATION STATUS: Chronic | ICD-10-CM

## 2025-08-07 DIAGNOSIS — Z98.890 OTHER SPECIFIED POSTPROCEDURAL STATES: Chronic | ICD-10-CM

## 2025-08-07 DIAGNOSIS — E78.5 HYPERLIPIDEMIA, UNSPECIFIED: ICD-10-CM

## 2025-08-07 DIAGNOSIS — Z00.8 ENCOUNTER FOR OTHER GENERAL EXAMINATION: ICD-10-CM

## 2025-08-07 DIAGNOSIS — Z98.891 HISTORY OF UTERINE SCAR FROM PREVIOUS SURGERY: Chronic | ICD-10-CM

## 2025-08-07 PROCEDURE — 76536 US EXAM OF HEAD AND NECK: CPT | Mod: 26

## 2025-08-07 PROCEDURE — 76536 US EXAM OF HEAD AND NECK: CPT

## 2025-08-16 ENCOUNTER — INPATIENT (INPATIENT)
Facility: HOSPITAL | Age: 60
LOS: 2 days | Discharge: HOME HEALTH | End: 2025-08-19
Attending: STUDENT IN AN ORGANIZED HEALTH CARE EDUCATION/TRAINING PROGRAM | Admitting: STUDENT IN AN ORGANIZED HEALTH CARE EDUCATION/TRAINING PROGRAM
Payer: COMMERCIAL

## 2025-08-16 VITALS
RESPIRATION RATE: 17 BRPM | DIASTOLIC BLOOD PRESSURE: 77 MMHG | SYSTOLIC BLOOD PRESSURE: 141 MMHG | OXYGEN SATURATION: 99 % | WEIGHT: 179.9 LBS | HEIGHT: 64 IN | TEMPERATURE: 98 F | HEART RATE: 84 BPM

## 2025-08-16 DIAGNOSIS — D05.12 INTRADUCTAL CARCINOMA IN SITU OF LEFT BREAST: ICD-10-CM

## 2025-08-16 DIAGNOSIS — E78.5 HYPERLIPIDEMIA, UNSPECIFIED: ICD-10-CM

## 2025-08-16 DIAGNOSIS — Z29.9 ENCOUNTER FOR PROPHYLACTIC MEASURES, UNSPECIFIED: ICD-10-CM

## 2025-08-16 DIAGNOSIS — T81.49XA INFECTION FOLLOWING A PROCEDURE, OTHER SURGICAL SITE, INITIAL ENCOUNTER: ICD-10-CM

## 2025-08-16 DIAGNOSIS — Z98.890 OTHER SPECIFIED POSTPROCEDURAL STATES: Chronic | ICD-10-CM

## 2025-08-16 DIAGNOSIS — L02.213 CUTANEOUS ABSCESS OF CHEST WALL: ICD-10-CM

## 2025-08-16 DIAGNOSIS — Z98.891 HISTORY OF UTERINE SCAR FROM PREVIOUS SURGERY: Chronic | ICD-10-CM

## 2025-08-16 DIAGNOSIS — E11.9 TYPE 2 DIABETES MELLITUS WITHOUT COMPLICATIONS: ICD-10-CM

## 2025-08-16 DIAGNOSIS — Z98.51 TUBAL LIGATION STATUS: Chronic | ICD-10-CM

## 2025-08-16 LAB
ANION GAP SERPL CALC-SCNC: 11 MMOL/L — SIGNIFICANT CHANGE UP (ref 5–17)
BASOPHILS # BLD AUTO: 0.05 K/UL — SIGNIFICANT CHANGE UP (ref 0–0.2)
BASOPHILS NFR BLD AUTO: 0.7 % — SIGNIFICANT CHANGE UP (ref 0–2)
BUN SERPL-MCNC: 8 MG/DL — SIGNIFICANT CHANGE UP (ref 7–23)
CALCIUM SERPL-MCNC: 9.2 MG/DL — SIGNIFICANT CHANGE UP (ref 8.4–10.5)
CHLORIDE SERPL-SCNC: 104 MMOL/L — SIGNIFICANT CHANGE UP (ref 96–108)
CO2 SERPL-SCNC: 26 MMOL/L — SIGNIFICANT CHANGE UP (ref 22–31)
CREAT SERPL-MCNC: 0.76 MG/DL — SIGNIFICANT CHANGE UP (ref 0.5–1.3)
EGFR: 91 ML/MIN/1.73M2 — SIGNIFICANT CHANGE UP
EGFR: 91 ML/MIN/1.73M2 — SIGNIFICANT CHANGE UP
EOSINOPHIL # BLD AUTO: 0.08 K/UL — SIGNIFICANT CHANGE UP (ref 0–0.5)
EOSINOPHIL NFR BLD AUTO: 1 % — SIGNIFICANT CHANGE UP (ref 0–6)
GLUCOSE SERPL-MCNC: 112 MG/DL — HIGH (ref 70–99)
HCT VFR BLD CALC: 37.9 % — SIGNIFICANT CHANGE UP (ref 34.5–45)
HGB BLD-MCNC: 12.2 G/DL — SIGNIFICANT CHANGE UP (ref 11.5–15.5)
IMM GRANULOCYTES # BLD AUTO: 0.02 K/UL — SIGNIFICANT CHANGE UP (ref 0–0.07)
IMM GRANULOCYTES NFR BLD AUTO: 0.3 % — SIGNIFICANT CHANGE UP (ref 0–0.9)
LACTATE SERPL-SCNC: 0.6 MMOL/L — SIGNIFICANT CHANGE UP (ref 0.5–2)
LYMPHOCYTES # BLD AUTO: 1.69 K/UL — SIGNIFICANT CHANGE UP (ref 1–3.3)
LYMPHOCYTES NFR BLD AUTO: 22 % — SIGNIFICANT CHANGE UP (ref 13–44)
MCHC RBC-ENTMCNC: 28.8 PG — SIGNIFICANT CHANGE UP (ref 27–34)
MCHC RBC-ENTMCNC: 32.2 G/DL — SIGNIFICANT CHANGE UP (ref 32–36)
MCV RBC AUTO: 89.6 FL — SIGNIFICANT CHANGE UP (ref 80–100)
MONOCYTES # BLD AUTO: 0.65 K/UL — SIGNIFICANT CHANGE UP (ref 0–0.9)
MONOCYTES NFR BLD AUTO: 8.5 % — SIGNIFICANT CHANGE UP (ref 2–14)
NEUTROPHILS # BLD AUTO: 5.19 K/UL — SIGNIFICANT CHANGE UP (ref 1.8–7.4)
NEUTROPHILS NFR BLD AUTO: 67.5 % — SIGNIFICANT CHANGE UP (ref 43–77)
NRBC # BLD AUTO: 0 K/UL — SIGNIFICANT CHANGE UP (ref 0–0)
NRBC # FLD: 0 K/UL — SIGNIFICANT CHANGE UP (ref 0–0)
NRBC BLD AUTO-RTO: 0 /100 WBCS — SIGNIFICANT CHANGE UP (ref 0–0)
PLATELET # BLD AUTO: 344 K/UL — SIGNIFICANT CHANGE UP (ref 150–400)
PMV BLD: 10 FL — SIGNIFICANT CHANGE UP (ref 7–13)
POTASSIUM SERPL-MCNC: 4.4 MMOL/L — SIGNIFICANT CHANGE UP (ref 3.5–5.3)
POTASSIUM SERPL-SCNC: 4.4 MMOL/L — SIGNIFICANT CHANGE UP (ref 3.5–5.3)
RBC # BLD: 4.23 M/UL — SIGNIFICANT CHANGE UP (ref 3.8–5.2)
RBC # FLD: 13.6 % — SIGNIFICANT CHANGE UP (ref 10.3–14.5)
SODIUM SERPL-SCNC: 141 MMOL/L — SIGNIFICANT CHANGE UP (ref 135–145)
WBC # BLD: 7.68 K/UL — SIGNIFICANT CHANGE UP (ref 3.8–10.5)
WBC # FLD AUTO: 7.68 K/UL — SIGNIFICANT CHANGE UP (ref 3.8–10.5)

## 2025-08-16 PROCEDURE — 83605 ASSAY OF LACTIC ACID: CPT

## 2025-08-16 PROCEDURE — 82962 GLUCOSE BLOOD TEST: CPT

## 2025-08-16 PROCEDURE — 99285 EMERGENCY DEPT VISIT HI MDM: CPT

## 2025-08-16 PROCEDURE — 85025 COMPLETE CBC W/AUTO DIFF WBC: CPT

## 2025-08-16 PROCEDURE — 87040 BLOOD CULTURE FOR BACTERIA: CPT

## 2025-08-16 PROCEDURE — 36415 COLL VENOUS BLD VENIPUNCTURE: CPT

## 2025-08-16 PROCEDURE — 80048 BASIC METABOLIC PNL TOTAL CA: CPT

## 2025-08-16 PROCEDURE — 99233 SBSQ HOSP IP/OBS HIGH 50: CPT

## 2025-08-16 RX ORDER — MELATONIN 5 MG
3 TABLET ORAL AT BEDTIME
Refills: 0 | Status: DISCONTINUED | OUTPATIENT
Start: 2025-08-16 | End: 2025-08-19

## 2025-08-16 RX ORDER — VANCOMYCIN HCL IN 5 % DEXTROSE 1.5G/250ML
750 PLASTIC BAG, INJECTION (ML) INTRAVENOUS EVERY 12 HOURS
Refills: 0 | Status: COMPLETED | OUTPATIENT
Start: 2025-08-17 | End: 2025-08-17

## 2025-08-16 RX ORDER — VANCOMYCIN HCL IN 5 % DEXTROSE 1.5G/250ML
1000 PLASTIC BAG, INJECTION (ML) INTRAVENOUS ONCE
Refills: 0 | Status: COMPLETED | OUTPATIENT
Start: 2025-08-16 | End: 2025-08-16

## 2025-08-16 RX ORDER — ONDANSETRON HCL/PF 4 MG/2 ML
4 VIAL (ML) INJECTION EVERY 8 HOURS
Refills: 0 | Status: DISCONTINUED | OUTPATIENT
Start: 2025-08-16 | End: 2025-08-19

## 2025-08-16 RX ORDER — PIPERACILLIN-TAZO-DEXTROSE,ISO 3.375G/5
4.5 IV SOLUTION, PIGGYBACK PREMIX FROZEN(ML) INTRAVENOUS ONCE
Refills: 0 | Status: COMPLETED | OUTPATIENT
Start: 2025-08-16 | End: 2025-08-16

## 2025-08-16 RX ORDER — MAGNESIUM, ALUMINUM HYDROXIDE 200-200 MG
30 TABLET,CHEWABLE ORAL EVERY 4 HOURS
Refills: 0 | Status: DISCONTINUED | OUTPATIENT
Start: 2025-08-16 | End: 2025-08-19

## 2025-08-16 RX ORDER — PIPERACILLIN-TAZO-DEXTROSE,ISO 3.375G/5
4.5 IV SOLUTION, PIGGYBACK PREMIX FROZEN(ML) INTRAVENOUS EVERY 8 HOURS
Refills: 0 | Status: DISCONTINUED | OUTPATIENT
Start: 2025-08-17 | End: 2025-08-17

## 2025-08-16 RX ORDER — ACETAMINOPHEN 500 MG/5ML
650 LIQUID (ML) ORAL EVERY 6 HOURS
Refills: 0 | Status: DISCONTINUED | OUTPATIENT
Start: 2025-08-16 | End: 2025-08-19

## 2025-08-16 RX ORDER — OXYCODONE HYDROCHLORIDE 30 MG/1
2.5 TABLET ORAL EVERY 4 HOURS
Refills: 0 | Status: DISCONTINUED | OUTPATIENT
Start: 2025-08-16 | End: 2025-08-18

## 2025-08-16 RX ORDER — ATORVASTATIN CALCIUM 80 MG/1
10 TABLET, FILM COATED ORAL AT BEDTIME
Refills: 0 | Status: DISCONTINUED | OUTPATIENT
Start: 2025-08-16 | End: 2025-08-19

## 2025-08-16 RX ORDER — AMPICILLIN SODIUM AND SULBACTAM SODIUM 1; .5 G/1; G/1
3 INJECTION, POWDER, FOR SOLUTION INTRAMUSCULAR; INTRAVENOUS ONCE
Refills: 0 | Status: COMPLETED | OUTPATIENT
Start: 2025-08-16 | End: 2025-08-16

## 2025-08-16 RX ADMIN — AMPICILLIN SODIUM AND SULBACTAM SODIUM 200 GRAM(S): 1; .5 INJECTION, POWDER, FOR SOLUTION INTRAMUSCULAR; INTRAVENOUS at 14:18

## 2025-08-16 RX ADMIN — OXYCODONE HYDROCHLORIDE 2.5 MILLIGRAM(S): 30 TABLET ORAL at 20:54

## 2025-08-16 RX ADMIN — ATORVASTATIN CALCIUM 10 MILLIGRAM(S): 80 TABLET, FILM COATED ORAL at 21:27

## 2025-08-16 RX ADMIN — Medication 25 GRAM(S): at 21:27

## 2025-08-16 RX ADMIN — Medication 650 MILLIGRAM(S): at 17:44

## 2025-08-16 RX ADMIN — OXYCODONE HYDROCHLORIDE 2.5 MILLIGRAM(S): 30 TABLET ORAL at 19:54

## 2025-08-16 RX ADMIN — Medication 200 GRAM(S): at 18:17

## 2025-08-16 RX ADMIN — Medication 4 MILLIGRAM(S): at 14:26

## 2025-08-16 RX ADMIN — Medication 650 MILLIGRAM(S): at 16:44

## 2025-08-16 RX ADMIN — Medication 250 MILLIGRAM(S): at 14:50

## 2025-08-17 PROBLEM — E04.1 NONTOXIC SINGLE THYROID NODULE: Chronic | Status: INACTIVE | Noted: 2022-04-01 | Resolved: 2025-08-16

## 2025-08-17 PROBLEM — M85.80 OTHER SPECIFIED DISORDERS OF BONE DENSITY AND STRUCTURE, UNSPECIFIED SITE: Chronic | Status: INACTIVE | Noted: 2022-04-01 | Resolved: 2025-08-16

## 2025-08-17 PROBLEM — Z86.39 PERSONAL HISTORY OF OTHER ENDOCRINE, NUTRITIONAL AND METABOLIC DISEASE: Chronic | Status: INACTIVE | Noted: 2025-03-17 | Resolved: 2025-08-16

## 2025-08-17 PROBLEM — U07.1 COVID-19: Chronic | Status: INACTIVE | Noted: 2022-04-01 | Resolved: 2025-08-16

## 2025-08-17 PROBLEM — Z86.69 PERSONAL HISTORY OF OTHER DISEASES OF THE NERVOUS SYSTEM AND SENSE ORGANS: Chronic | Status: INACTIVE | Noted: 2022-04-01 | Resolved: 2025-08-16

## 2025-08-17 PROBLEM — E11.9 TYPE 2 DIABETES MELLITUS WITHOUT COMPLICATIONS: Chronic | Status: INACTIVE | Noted: 2018-04-02 | Resolved: 2025-08-16

## 2025-08-17 LAB
A1C WITH ESTIMATED AVERAGE GLUCOSE RESULT: 6.2 % — HIGH (ref 4–5.6)
ANION GAP SERPL CALC-SCNC: 10 MMOL/L — SIGNIFICANT CHANGE UP (ref 5–17)
BUN SERPL-MCNC: 11 MG/DL — SIGNIFICANT CHANGE UP (ref 7–23)
CALCIUM SERPL-MCNC: 8.9 MG/DL — SIGNIFICANT CHANGE UP (ref 8.4–10.5)
CHLORIDE SERPL-SCNC: 103 MMOL/L — SIGNIFICANT CHANGE UP (ref 96–108)
CHOLEST SERPL-MCNC: 161 MG/DL — SIGNIFICANT CHANGE UP
CO2 SERPL-SCNC: 25 MMOL/L — SIGNIFICANT CHANGE UP (ref 22–31)
CREAT SERPL-MCNC: 0.81 MG/DL — SIGNIFICANT CHANGE UP (ref 0.5–1.3)
EGFR: 85 ML/MIN/1.73M2 — SIGNIFICANT CHANGE UP
EGFR: 85 ML/MIN/1.73M2 — SIGNIFICANT CHANGE UP
ESTIMATED AVERAGE GLUCOSE: 131 MG/DL — HIGH (ref 68–114)
GLUCOSE SERPL-MCNC: 241 MG/DL — HIGH (ref 70–99)
HCT VFR BLD CALC: 37.2 % — SIGNIFICANT CHANGE UP (ref 34.5–45)
HDLC SERPL-MCNC: 44 MG/DL — LOW
HGB BLD-MCNC: 11.6 G/DL — SIGNIFICANT CHANGE UP (ref 11.5–15.5)
LDLC SERPL-MCNC: 105 MG/DL — HIGH
LIPID PNL WITH DIRECT LDL SERPL: 105 MG/DL — HIGH
MCHC RBC-ENTMCNC: 28.6 PG — SIGNIFICANT CHANGE UP (ref 27–34)
MCHC RBC-ENTMCNC: 31.2 G/DL — LOW (ref 32–36)
MCV RBC AUTO: 91.9 FL — SIGNIFICANT CHANGE UP (ref 80–100)
NONHDLC SERPL-MCNC: 117 MG/DL — SIGNIFICANT CHANGE UP
NRBC # BLD AUTO: 0 K/UL — SIGNIFICANT CHANGE UP (ref 0–0)
NRBC # FLD: 0 K/UL — SIGNIFICANT CHANGE UP (ref 0–0)
NRBC BLD AUTO-RTO: 0 /100 WBCS — SIGNIFICANT CHANGE UP (ref 0–0)
PLATELET # BLD AUTO: 333 K/UL — SIGNIFICANT CHANGE UP (ref 150–400)
PMV BLD: 9.9 FL — SIGNIFICANT CHANGE UP (ref 7–13)
POTASSIUM SERPL-MCNC: 3.9 MMOL/L — SIGNIFICANT CHANGE UP (ref 3.5–5.3)
POTASSIUM SERPL-SCNC: 3.9 MMOL/L — SIGNIFICANT CHANGE UP (ref 3.5–5.3)
RBC # BLD: 4.05 M/UL — SIGNIFICANT CHANGE UP (ref 3.8–5.2)
RBC # FLD: 13.6 % — SIGNIFICANT CHANGE UP (ref 10.3–14.5)
SODIUM SERPL-SCNC: 138 MMOL/L — SIGNIFICANT CHANGE UP (ref 135–145)
TRIGL SERPL-MCNC: 60 MG/DL — SIGNIFICANT CHANGE UP
WBC # BLD: 7.27 K/UL — SIGNIFICANT CHANGE UP (ref 3.8–10.5)
WBC # FLD AUTO: 7.27 K/UL — SIGNIFICANT CHANGE UP (ref 3.8–10.5)

## 2025-08-17 PROCEDURE — 99233 SBSQ HOSP IP/OBS HIGH 50: CPT

## 2025-08-17 RX ORDER — DEXTROSE 50 % IN WATER 50 %
25 SYRINGE (ML) INTRAVENOUS ONCE
Refills: 0 | Status: DISCONTINUED | OUTPATIENT
Start: 2025-08-17 | End: 2025-08-19

## 2025-08-17 RX ORDER — SODIUM CHLORIDE 9 G/1000ML
1000 INJECTION, SOLUTION INTRAVENOUS
Refills: 0 | Status: DISCONTINUED | OUTPATIENT
Start: 2025-08-17 | End: 2025-08-19

## 2025-08-17 RX ORDER — DEXTROSE 50 % IN WATER 50 %
15 SYRINGE (ML) INTRAVENOUS ONCE
Refills: 0 | Status: DISCONTINUED | OUTPATIENT
Start: 2025-08-17 | End: 2025-08-19

## 2025-08-17 RX ORDER — CEFTRIAXONE 500 MG/1
1000 INJECTION, POWDER, FOR SOLUTION INTRAMUSCULAR; INTRAVENOUS EVERY 24 HOURS
Refills: 0 | Status: DISCONTINUED | OUTPATIENT
Start: 2025-08-17 | End: 2025-08-19

## 2025-08-17 RX ORDER — DEXTROSE 50 % IN WATER 50 %
12.5 SYRINGE (ML) INTRAVENOUS ONCE
Refills: 0 | Status: DISCONTINUED | OUTPATIENT
Start: 2025-08-17 | End: 2025-08-19

## 2025-08-17 RX ORDER — INSULIN LISPRO 100 U/ML
INJECTION, SOLUTION INTRAVENOUS; SUBCUTANEOUS
Refills: 0 | Status: DISCONTINUED | OUTPATIENT
Start: 2025-08-17 | End: 2025-08-19

## 2025-08-17 RX ORDER — HEPARIN SODIUM 1000 [USP'U]/ML
5000 INJECTION INTRAVENOUS; SUBCUTANEOUS EVERY 8 HOURS
Refills: 0 | Status: DISCONTINUED | OUTPATIENT
Start: 2025-08-17 | End: 2025-08-18

## 2025-08-17 RX ORDER — GLUCAGON 3 MG/1
1 POWDER NASAL ONCE
Refills: 0 | Status: DISCONTINUED | OUTPATIENT
Start: 2025-08-17 | End: 2025-08-19

## 2025-08-17 RX ADMIN — OXYCODONE HYDROCHLORIDE 2.5 MILLIGRAM(S): 30 TABLET ORAL at 04:42

## 2025-08-17 RX ADMIN — OXYCODONE HYDROCHLORIDE 2.5 MILLIGRAM(S): 30 TABLET ORAL at 22:04

## 2025-08-17 RX ADMIN — ATORVASTATIN CALCIUM 10 MILLIGRAM(S): 80 TABLET, FILM COATED ORAL at 21:05

## 2025-08-17 RX ADMIN — CEFTRIAXONE 100 MILLIGRAM(S): 500 INJECTION, POWDER, FOR SOLUTION INTRAMUSCULAR; INTRAVENOUS at 11:11

## 2025-08-17 RX ADMIN — HEPARIN SODIUM 5000 UNIT(S): 1000 INJECTION INTRAVENOUS; SUBCUTANEOUS at 21:05

## 2025-08-17 RX ADMIN — OXYCODONE HYDROCHLORIDE 2.5 MILLIGRAM(S): 30 TABLET ORAL at 05:42

## 2025-08-17 RX ADMIN — INSULIN LISPRO 2: 100 INJECTION, SOLUTION INTRAVENOUS; SUBCUTANEOUS at 13:25

## 2025-08-17 RX ADMIN — Medication 250 MILLIGRAM(S): at 15:48

## 2025-08-17 RX ADMIN — Medication 4.5 GRAM(S): at 01:30

## 2025-08-17 RX ADMIN — Medication 250 MILLIGRAM(S): at 03:37

## 2025-08-17 RX ADMIN — OXYCODONE HYDROCHLORIDE 2.5 MILLIGRAM(S): 30 TABLET ORAL at 21:04

## 2025-08-17 RX ADMIN — OXYCODONE HYDROCHLORIDE 2.5 MILLIGRAM(S): 30 TABLET ORAL at 12:40

## 2025-08-17 RX ADMIN — HEPARIN SODIUM 5000 UNIT(S): 1000 INJECTION INTRAVENOUS; SUBCUTANEOUS at 14:19

## 2025-08-17 RX ADMIN — Medication 25 GRAM(S): at 06:26

## 2025-08-17 RX ADMIN — OXYCODONE HYDROCHLORIDE 2.5 MILLIGRAM(S): 30 TABLET ORAL at 13:40

## 2025-08-18 ENCOUNTER — TRANSCRIPTION ENCOUNTER (OUTPATIENT)
Age: 60
End: 2025-08-18

## 2025-08-18 LAB
ADD ON TEST-SPECIMEN IN LAB: SIGNIFICANT CHANGE UP
ANION GAP SERPL CALC-SCNC: 12 MMOL/L — SIGNIFICANT CHANGE UP (ref 5–17)
BASOPHILS # BLD AUTO: 0.04 K/UL — SIGNIFICANT CHANGE UP (ref 0–0.2)
BASOPHILS NFR BLD AUTO: 0.7 % — SIGNIFICANT CHANGE UP (ref 0–2)
BUN SERPL-MCNC: 13 MG/DL — SIGNIFICANT CHANGE UP (ref 7–23)
CALCIUM SERPL-MCNC: 9 MG/DL — SIGNIFICANT CHANGE UP (ref 8.4–10.5)
CHLORIDE SERPL-SCNC: 101 MMOL/L — SIGNIFICANT CHANGE UP (ref 96–108)
CO2 SERPL-SCNC: 24 MMOL/L — SIGNIFICANT CHANGE UP (ref 22–31)
CREAT SERPL-MCNC: 0.69 MG/DL — SIGNIFICANT CHANGE UP (ref 0.5–1.3)
EGFR: 101 ML/MIN/1.73M2 — SIGNIFICANT CHANGE UP
EGFR: 101 ML/MIN/1.73M2 — SIGNIFICANT CHANGE UP
EOSINOPHIL # BLD AUTO: 0.22 K/UL — SIGNIFICANT CHANGE UP (ref 0–0.5)
EOSINOPHIL NFR BLD AUTO: 3.6 % — SIGNIFICANT CHANGE UP (ref 0–6)
GLUCOSE SERPL-MCNC: 148 MG/DL — HIGH (ref 70–99)
HCT VFR BLD CALC: 35.9 % — SIGNIFICANT CHANGE UP (ref 34.5–45)
HGB BLD-MCNC: 11.5 G/DL — SIGNIFICANT CHANGE UP (ref 11.5–15.5)
IMM GRANULOCYTES # BLD AUTO: 0.02 K/UL — SIGNIFICANT CHANGE UP (ref 0–0.07)
IMM GRANULOCYTES NFR BLD AUTO: 0.3 % — SIGNIFICANT CHANGE UP (ref 0–0.9)
LYMPHOCYTES # BLD AUTO: 2.53 K/UL — SIGNIFICANT CHANGE UP (ref 1–3.3)
LYMPHOCYTES NFR BLD AUTO: 41.8 % — SIGNIFICANT CHANGE UP (ref 13–44)
MAGNESIUM SERPL-MCNC: 2.1 MG/DL — SIGNIFICANT CHANGE UP (ref 1.6–2.6)
MCHC RBC-ENTMCNC: 28.9 PG — SIGNIFICANT CHANGE UP (ref 27–34)
MCHC RBC-ENTMCNC: 32 G/DL — SIGNIFICANT CHANGE UP (ref 32–36)
MCV RBC AUTO: 90.2 FL — SIGNIFICANT CHANGE UP (ref 80–100)
MONOCYTES # BLD AUTO: 0.56 K/UL — SIGNIFICANT CHANGE UP (ref 0–0.9)
MONOCYTES NFR BLD AUTO: 9.3 % — SIGNIFICANT CHANGE UP (ref 2–14)
MRSA PCR RESULT.: NEGATIVE — SIGNIFICANT CHANGE UP
NEUTROPHILS # BLD AUTO: 2.68 K/UL — SIGNIFICANT CHANGE UP (ref 1.8–7.4)
NEUTROPHILS NFR BLD AUTO: 44.3 % — SIGNIFICANT CHANGE UP (ref 43–77)
NRBC # BLD AUTO: 0 K/UL — SIGNIFICANT CHANGE UP (ref 0–0)
NRBC # FLD: 0 K/UL — SIGNIFICANT CHANGE UP (ref 0–0)
NRBC BLD AUTO-RTO: 0 /100 WBCS — SIGNIFICANT CHANGE UP (ref 0–0)
PHOSPHATE SERPL-MCNC: 3.9 MG/DL — SIGNIFICANT CHANGE UP (ref 2.5–4.5)
PLATELET # BLD AUTO: 341 K/UL — SIGNIFICANT CHANGE UP (ref 150–400)
PMV BLD: 10 FL — SIGNIFICANT CHANGE UP (ref 7–13)
POTASSIUM SERPL-MCNC: 4.3 MMOL/L — SIGNIFICANT CHANGE UP (ref 3.5–5.3)
POTASSIUM SERPL-SCNC: 4.3 MMOL/L — SIGNIFICANT CHANGE UP (ref 3.5–5.3)
RBC # BLD: 3.98 M/UL — SIGNIFICANT CHANGE UP (ref 3.8–5.2)
RBC # FLD: 13.3 % — SIGNIFICANT CHANGE UP (ref 10.3–14.5)
S AUREUS DNA NOSE QL NAA+PROBE: NEGATIVE — SIGNIFICANT CHANGE UP
SODIUM SERPL-SCNC: 137 MMOL/L — SIGNIFICANT CHANGE UP (ref 135–145)
WBC # BLD: 6.05 K/UL — SIGNIFICANT CHANGE UP (ref 3.8–10.5)
WBC # FLD AUTO: 6.05 K/UL — SIGNIFICANT CHANGE UP (ref 3.8–10.5)

## 2025-08-18 PROCEDURE — 87641 MR-STAPH DNA AMP PROBE: CPT

## 2025-08-18 PROCEDURE — 87077 CULTURE AEROBIC IDENTIFY: CPT

## 2025-08-18 PROCEDURE — 87040 BLOOD CULTURE FOR BACTERIA: CPT

## 2025-08-18 PROCEDURE — 36415 COLL VENOUS BLD VENIPUNCTURE: CPT

## 2025-08-18 PROCEDURE — 80048 BASIC METABOLIC PNL TOTAL CA: CPT

## 2025-08-18 PROCEDURE — 99233 SBSQ HOSP IP/OBS HIGH 50: CPT | Mod: GC

## 2025-08-18 PROCEDURE — 85027 COMPLETE CBC AUTOMATED: CPT

## 2025-08-18 PROCEDURE — 80202 ASSAY OF VANCOMYCIN: CPT

## 2025-08-18 PROCEDURE — 82962 GLUCOSE BLOOD TEST: CPT

## 2025-08-18 PROCEDURE — 83735 ASSAY OF MAGNESIUM: CPT

## 2025-08-18 PROCEDURE — 87070 CULTURE OTHR SPECIMN AEROBIC: CPT

## 2025-08-18 PROCEDURE — 85025 COMPLETE CBC W/AUTO DIFF WBC: CPT

## 2025-08-18 PROCEDURE — 87640 STAPH A DNA AMP PROBE: CPT

## 2025-08-18 PROCEDURE — 83605 ASSAY OF LACTIC ACID: CPT

## 2025-08-18 PROCEDURE — 83036 HEMOGLOBIN GLYCOSYLATED A1C: CPT

## 2025-08-18 PROCEDURE — 80061 LIPID PANEL: CPT

## 2025-08-18 PROCEDURE — 84100 ASSAY OF PHOSPHORUS: CPT

## 2025-08-18 RX ORDER — OXYCODONE HYDROCHLORIDE 30 MG/1
2.5 TABLET ORAL EVERY 4 HOURS
Refills: 0 | Status: DISCONTINUED | OUTPATIENT
Start: 2025-08-18 | End: 2025-08-19

## 2025-08-18 RX ORDER — ENOXAPARIN SODIUM 100 MG/ML
40 INJECTION SUBCUTANEOUS EVERY 24 HOURS
Refills: 0 | Status: DISCONTINUED | OUTPATIENT
Start: 2025-08-18 | End: 2025-08-19

## 2025-08-18 RX ORDER — MELATONIN 5 MG
1 TABLET ORAL
Qty: 0 | Refills: 0 | DISCHARGE
Start: 2025-08-18

## 2025-08-18 RX ORDER — MAGNESIUM, ALUMINUM HYDROXIDE 200-200 MG
30 TABLET,CHEWABLE ORAL
Qty: 0 | Refills: 0 | DISCHARGE
Start: 2025-08-18

## 2025-08-18 RX ORDER — ACETAMINOPHEN 500 MG/5ML
2 LIQUID (ML) ORAL
Qty: 0 | Refills: 0 | DISCHARGE
Start: 2025-08-18

## 2025-08-18 RX ORDER — VANCOMYCIN HCL IN 5 % DEXTROSE 1.5G/250ML
1250 PLASTIC BAG, INJECTION (ML) INTRAVENOUS EVERY 12 HOURS
Refills: 0 | Status: COMPLETED | OUTPATIENT
Start: 2025-08-18 | End: 2025-08-19

## 2025-08-18 RX ADMIN — Medication 166.67 MILLIGRAM(S): at 04:45

## 2025-08-18 RX ADMIN — ATORVASTATIN CALCIUM 10 MILLIGRAM(S): 80 TABLET, FILM COATED ORAL at 21:41

## 2025-08-18 RX ADMIN — OXYCODONE HYDROCHLORIDE 2.5 MILLIGRAM(S): 30 TABLET ORAL at 15:19

## 2025-08-18 RX ADMIN — OXYCODONE HYDROCHLORIDE 2.5 MILLIGRAM(S): 30 TABLET ORAL at 06:31

## 2025-08-18 RX ADMIN — Medication 166.67 MILLIGRAM(S): at 18:43

## 2025-08-18 RX ADMIN — HEPARIN SODIUM 5000 UNIT(S): 1000 INJECTION INTRAVENOUS; SUBCUTANEOUS at 06:35

## 2025-08-18 RX ADMIN — OXYCODONE HYDROCHLORIDE 2.5 MILLIGRAM(S): 30 TABLET ORAL at 07:31

## 2025-08-18 RX ADMIN — OXYCODONE HYDROCHLORIDE 2.5 MILLIGRAM(S): 30 TABLET ORAL at 16:19

## 2025-08-18 RX ADMIN — CEFTRIAXONE 100 MILLIGRAM(S): 500 INJECTION, POWDER, FOR SOLUTION INTRAMUSCULAR; INTRAVENOUS at 10:55

## 2025-08-18 RX ADMIN — ENOXAPARIN SODIUM 40 MILLIGRAM(S): 100 INJECTION SUBCUTANEOUS at 19:12

## 2025-08-19 ENCOUNTER — TRANSCRIPTION ENCOUNTER (OUTPATIENT)
Age: 60
End: 2025-08-19

## 2025-08-19 VITALS
RESPIRATION RATE: 18 BRPM | HEART RATE: 77 BPM | SYSTOLIC BLOOD PRESSURE: 113 MMHG | TEMPERATURE: 98 F | OXYGEN SATURATION: 97 % | DIASTOLIC BLOOD PRESSURE: 71 MMHG

## 2025-08-19 PROCEDURE — 87040 BLOOD CULTURE FOR BACTERIA: CPT

## 2025-08-19 PROCEDURE — 87641 MR-STAPH DNA AMP PROBE: CPT

## 2025-08-19 PROCEDURE — 36415 COLL VENOUS BLD VENIPUNCTURE: CPT

## 2025-08-19 PROCEDURE — 80202 ASSAY OF VANCOMYCIN: CPT

## 2025-08-19 PROCEDURE — 83036 HEMOGLOBIN GLYCOSYLATED A1C: CPT

## 2025-08-19 PROCEDURE — 87186 SC STD MICRODIL/AGAR DIL: CPT

## 2025-08-19 PROCEDURE — 87070 CULTURE OTHR SPECIMN AEROBIC: CPT

## 2025-08-19 PROCEDURE — 87075 CULTR BACTERIA EXCEPT BLOOD: CPT

## 2025-08-19 PROCEDURE — 85025 COMPLETE CBC W/AUTO DIFF WBC: CPT

## 2025-08-19 PROCEDURE — 87077 CULTURE AEROBIC IDENTIFY: CPT

## 2025-08-19 PROCEDURE — 87640 STAPH A DNA AMP PROBE: CPT

## 2025-08-19 PROCEDURE — 99285 EMERGENCY DEPT VISIT HI MDM: CPT

## 2025-08-19 PROCEDURE — 80048 BASIC METABOLIC PNL TOTAL CA: CPT

## 2025-08-19 PROCEDURE — 83735 ASSAY OF MAGNESIUM: CPT

## 2025-08-19 PROCEDURE — 80061 LIPID PANEL: CPT

## 2025-08-19 PROCEDURE — 82962 GLUCOSE BLOOD TEST: CPT

## 2025-08-19 PROCEDURE — 83605 ASSAY OF LACTIC ACID: CPT

## 2025-08-19 PROCEDURE — 85027 COMPLETE CBC AUTOMATED: CPT

## 2025-08-19 PROCEDURE — 84100 ASSAY OF PHOSPHORUS: CPT

## 2025-08-19 PROCEDURE — 99239 HOSP IP/OBS DSCHRG MGMT >30: CPT | Mod: GC

## 2025-08-19 RX ORDER — SULFAMETHOXAZOLE/TRIMETHOPRIM 800-160 MG
1 TABLET ORAL
Qty: 0 | Refills: 0 | DISCHARGE
Start: 2025-08-19

## 2025-08-19 RX ORDER — SULFAMETHOXAZOLE/TRIMETHOPRIM 800-160 MG
1 TABLET ORAL
Qty: 8 | Refills: 0
Start: 2025-08-19 | End: 2025-08-22

## 2025-08-19 RX ORDER — SULFAMETHOXAZOLE/TRIMETHOPRIM 800-160 MG
1 TABLET ORAL EVERY 12 HOURS
Refills: 0 | Status: DISCONTINUED | OUTPATIENT
Start: 2025-08-19 | End: 2025-08-19

## 2025-08-19 RX ORDER — TIRZEPATIDE 7.5 MG/.5ML
7.5 INJECTION, SOLUTION SUBCUTANEOUS
Qty: 0 | Refills: 0
Start: 2025-08-19

## 2025-08-19 RX ADMIN — OXYCODONE HYDROCHLORIDE 2.5 MILLIGRAM(S): 30 TABLET ORAL at 12:06

## 2025-08-19 RX ADMIN — Medication 1 TABLET(S): at 12:42

## 2025-08-19 RX ADMIN — Medication 166.67 MILLIGRAM(S): at 05:39

## 2025-08-19 RX ADMIN — OXYCODONE HYDROCHLORIDE 2.5 MILLIGRAM(S): 30 TABLET ORAL at 13:06

## 2025-08-21 LAB
CULTURE RESULTS: SIGNIFICANT CHANGE UP
SPECIMEN SOURCE: SIGNIFICANT CHANGE UP

## 2025-08-26 DIAGNOSIS — T81.41XA INFECTION FOLLOWING A PROCEDURE, SUPERFICIAL INCISIONAL SURGICAL SITE, INITIAL ENCOUNTER: ICD-10-CM

## 2025-08-26 DIAGNOSIS — E11.65 TYPE 2 DIABETES MELLITUS WITH HYPERGLYCEMIA: ICD-10-CM

## 2025-08-26 DIAGNOSIS — E78.5 HYPERLIPIDEMIA, UNSPECIFIED: ICD-10-CM

## 2025-08-26 DIAGNOSIS — L02.213 CUTANEOUS ABSCESS OF CHEST WALL: ICD-10-CM

## 2025-08-26 DIAGNOSIS — Z79.84 LONG TERM (CURRENT) USE OF ORAL HYPOGLYCEMIC DRUGS: ICD-10-CM

## 2025-09-09 ENCOUNTER — NON-APPOINTMENT (OUTPATIENT)
Age: 60
End: 2025-09-09

## 2025-09-09 ENCOUNTER — APPOINTMENT (OUTPATIENT)
Dept: CARDIOLOGY | Facility: CLINIC | Age: 60
End: 2025-09-09
Payer: COMMERCIAL

## 2025-09-09 ENCOUNTER — LABORATORY RESULT (OUTPATIENT)
Age: 60
End: 2025-09-09

## 2025-09-09 VITALS — BODY MASS INDEX: 31.07 KG/M2 | WEIGHT: 181 LBS

## 2025-09-09 VITALS — DIASTOLIC BLOOD PRESSURE: 80 MMHG | SYSTOLIC BLOOD PRESSURE: 135 MMHG | OXYGEN SATURATION: 100 % | HEART RATE: 86 BPM

## 2025-09-09 DIAGNOSIS — G47.33 OBSTRUCTIVE SLEEP APNEA (ADULT) (PEDIATRIC): ICD-10-CM

## 2025-09-09 DIAGNOSIS — R55 SYNCOPE AND COLLAPSE: ICD-10-CM

## 2025-09-09 DIAGNOSIS — R79.89 OTHER SPECIFIED ABNORMAL FINDINGS OF BLOOD CHEMISTRY: ICD-10-CM

## 2025-09-09 PROCEDURE — 99205 OFFICE O/P NEW HI 60 MIN: CPT

## 2025-09-09 PROCEDURE — 93000 ELECTROCARDIOGRAM COMPLETE: CPT

## 2025-09-09 PROCEDURE — G2211 COMPLEX E/M VISIT ADD ON: CPT | Mod: NC

## 2025-09-10 PROBLEM — D05.12 INTRADUCTAL CARCINOMA IN SITU OF LEFT BREAST: Chronic | Status: ACTIVE | Noted: 2025-08-16

## 2025-09-12 LAB
25(OH)D3 SERPL-MCNC: 58.7 NG/ML
ALBUMIN SERPL ELPH-MCNC: 4.3 G/DL
ALP BLD-CCNC: 91 U/L
ALT SERPL-CCNC: 11 U/L
ANION GAP SERPL CALC-SCNC: 16 MMOL/L
AST SERPL-CCNC: 12 U/L
BASOPHILS # BLD AUTO: 0.05 K/UL
BASOPHILS NFR BLD AUTO: 0.8 %
BILIRUB SERPL-MCNC: 0.2 MG/DL
BUN SERPL-MCNC: 10 MG/DL
CALCIUM SERPL-MCNC: 9.5 MG/DL
CHLORIDE SERPL-SCNC: 104 MMOL/L
CHOLEST SERPL-MCNC: 242 MG/DL
CO2 SERPL-SCNC: 24 MMOL/L
CREAT SERPL-MCNC: 0.62 MG/DL
DEPRECATED KAPPA LC FREE/LAMBDA SER: 1.09 RATIO
EGFRCR SERPLBLD CKD-EPI 2021: 103 ML/MIN/1.73M2
EOSINOPHIL # BLD AUTO: 0.16 K/UL
EOSINOPHIL NFR BLD AUTO: 2.6 %
ESTIMATED AVERAGE GLUCOSE: 151 MG/DL
GLUCOSE SERPL-MCNC: 103 MG/DL
HBA1C MFR BLD HPLC: 6.9 %
HCT VFR BLD CALC: 38.8 %
HDLC SERPL-MCNC: 62 MG/DL
HGB BLD-MCNC: 12.6 G/DL
IMM GRANULOCYTES NFR BLD AUTO: 0.2 %
KAPPA LC CSF-MCNC: 1.38 MG/DL
KAPPA LC SERPL-MCNC: 1.51 MG/DL
LDLC SERPL-MCNC: 167 MG/DL
LYMPHOCYTES # BLD AUTO: 2.32 K/UL
LYMPHOCYTES NFR BLD AUTO: 37.5 %
MAN DIFF?: NORMAL
MCHC RBC-ENTMCNC: 28.8 PG
MCHC RBC-ENTMCNC: 32.5 G/DL
MCV RBC AUTO: 88.6 FL
MONOCYTES # BLD AUTO: 0.5 K/UL
MONOCYTES NFR BLD AUTO: 8.1 %
NEUTROPHILS # BLD AUTO: 3.14 K/UL
NEUTROPHILS NFR BLD AUTO: 50.8 %
NONHDLC SERPL-MCNC: 179 MG/DL
NT-PROBNP SERPL-MCNC: <36 PG/ML
PLATELET # BLD AUTO: 248 K/UL
POTASSIUM SERPL-SCNC: 3.9 MMOL/L
PREALB SERPL NEPH-MCNC: 24 MG/DL
PROT SERPL-MCNC: 6.9 G/DL
RBC # BLD: 4.38 M/UL
RBC # FLD: 14.4 %
SODIUM SERPL-SCNC: 144 MMOL/L
TRIGL SERPL-MCNC: 73 MG/DL
TSH SERPL-ACNC: 1.19 UIU/ML
WBC # FLD AUTO: 6.18 K/UL

## 2025-09-13 LAB — IGA 24H UR QL IFE: NORMAL

## 2025-09-15 LAB — M PROTEIN SPEC IFE-MCNC: NORMAL

## (undated) DEVICE — SUT PDS PLUS 2-0 27" CT-1

## (undated) DEVICE — PACK HAND

## (undated) DEVICE — GLV 7 PROTEXIS (WHITE)

## (undated) DEVICE — DRSG GAUZE MOISTURIZER 0.5 OZ 4X8

## (undated) DEVICE — SUT QUILL PDO 2-0 24CM 26MM

## (undated) DEVICE — LAP PAD 18 X 18"

## (undated) DEVICE — PACK ABDOMINOPLASTY

## (undated) DEVICE — CANNULA MERC FLARED BENT 9.7MMX30CM

## (undated) DEVICE — SOL IRR POUR NS 0.9% 500ML

## (undated) DEVICE — SUT VICRYL 0 18" CT-2 (POP-OFF)

## (undated) DEVICE — SUT QUILL PDO 2 36CM 48MM

## (undated) DEVICE — DRSG COMBINE 5 X 9"

## (undated) DEVICE — VENODYNE/SCD SLEEVE CALF MEDIUM

## (undated) DEVICE — WARMING BLANKET LOWER ADULT

## (undated) DEVICE — ELCTR STRYKER NEPTUNE BLADE COATED, INSULATED 70MM

## (undated) DEVICE — TOURNIQUET CUFF 18" DUAL PORT SINGLE BLADDER W PLC  (BLACK)

## (undated) DEVICE — BULB SYRINGE 2OZ (BLUE)

## (undated) DEVICE — NDL SPINAL 20G X 3.5" (YELLOW)

## (undated) DEVICE — DRSG DERMABOND 0.7ML

## (undated) DEVICE — STAPLER SKIN VISI-STAT 35 WIDE

## (undated) DEVICE — SUT PLAIN GUT FAST ABSORBING 5-0 PC-1

## (undated) DEVICE — POSITIONER FOAM EGG CRATE ULNAR 2PCS (PINK)

## (undated) DEVICE — SUT MONOCRYL 3-0 27" PS-2 UNDYED

## (undated) DEVICE — SPECIMEN CONTAINER 100ML

## (undated) DEVICE — DRSG SURGICAL BRA XL 40-42

## (undated) DEVICE — ELCTR BOVIE PENCIL BLADE 10FT

## (undated) DEVICE — DRSG STERISTRIPS 0.5 X 4"

## (undated) DEVICE — DRAIN RESERVOIR FOR JACKSON PRATT 100CC CARDINAL

## (undated) DEVICE — WARMING BLANKET FULL UNDERBODY

## (undated) DEVICE — DRSG ACE BANDAGE 2"

## (undated) DEVICE — DRAPE TOWEL BLUE 17" X 24"

## (undated) DEVICE — DRSG MASTISOL

## (undated) DEVICE — DRSG SURGICAL BRA MED 36-38

## (undated) DEVICE — GOWN ROYAL SILK XL

## (undated) DEVICE — Device

## (undated) DEVICE — NDL SPINAL 22G X 3.5" (BLACK)

## (undated) DEVICE — SUT VICRYL PLUS 2-0 27" CT-1 UNDYED

## (undated) DEVICE — DRSG DERMABOND PRINEO 60CM

## (undated) DEVICE — ELCTR GROUNDING PAD ADULT COVIDIEN

## (undated) DEVICE — SUT NYLON 3-0 18" PS-2

## (undated) DEVICE — DRAPE MEDIUM SHEET 44" X 70"

## (undated) DEVICE — DRSG TEGADERM 2.5 X 3"

## (undated) DEVICE — SYR LUER LOK 50CC

## (undated) DEVICE — SUT VICRYL 2-0 27" CT-2 UNDYED

## (undated) DEVICE — DRSG SURGICAL BRA LG 38-40

## (undated) DEVICE — SUT MONOCRYL 5-0 18" P-3 UNDYED

## (undated) DEVICE — GLV 8 PROTEXIS (WHITE)

## (undated) DEVICE — ABDOMINAL BINDER MED/LG 9" X 36"-64"

## (undated) DEVICE — ELCTR PENCIL SMOKE EVACUATOR COATED PUSH BUTTON 70MM

## (undated) DEVICE — MEDICATION LABELS W MARKER

## (undated) DEVICE — PREP CHLORAPREP HI-LITE ORANGE 26ML

## (undated) DEVICE — MARKING PEN W RULER

## (undated) DEVICE — SUT MONOCRYL 4-0 27" PS-2 UNDYED

## (undated) DEVICE — SUCTION YANKAUER FLEXIBE HI CAPACITY NO VENT

## (undated) DEVICE — MARKING PEN W RULER / LABELS

## (undated) DEVICE — GLV 7.5 PROTEXIS (WHITE)

## (undated) DEVICE — DRSG XEROFORM 5 X 9"

## (undated) DEVICE — TOURNIQUET CUFF 12" DUAL PORT W PLC

## (undated) DEVICE — PACK BREAST

## (undated) DEVICE — SUT STRATAFIX SYMMETRIC PDS PLUS 1 45CM CT-1

## (undated) DEVICE — SYR LUER LOK 20CC

## (undated) DEVICE — ELCTR BOVIE TIP BLADE INSULATED 2.75" EDGE

## (undated) DEVICE — SUT MONOCRYL 3-0 18" PS-2 UNDYED

## (undated) DEVICE — TUBING MICROAIRE ASPIRATION SET 12FT

## (undated) DEVICE — ELCTR BOVIE PENCIL SMOKE EVACUATION